# Patient Record
Sex: MALE | Race: BLACK OR AFRICAN AMERICAN | NOT HISPANIC OR LATINO | Employment: FULL TIME | URBAN - METROPOLITAN AREA
[De-identification: names, ages, dates, MRNs, and addresses within clinical notes are randomized per-mention and may not be internally consistent; named-entity substitution may affect disease eponyms.]

---

## 2017-05-25 ENCOUNTER — ALLSCRIPTS OFFICE VISIT (OUTPATIENT)
Dept: OTHER | Facility: OTHER | Age: 57
End: 2017-05-25

## 2017-05-25 ENCOUNTER — APPOINTMENT (OUTPATIENT)
Dept: LAB | Facility: CLINIC | Age: 57
End: 2017-05-25
Payer: COMMERCIAL

## 2017-05-25 DIAGNOSIS — Z13.6 ENCOUNTER FOR SCREENING FOR CARDIOVASCULAR DISORDERS: ICD-10-CM

## 2017-05-25 DIAGNOSIS — G47.30 SLEEP APNEA: ICD-10-CM

## 2017-05-25 DIAGNOSIS — E66.9 OBESITY: ICD-10-CM

## 2017-05-25 DIAGNOSIS — I10 ESSENTIAL (PRIMARY) HYPERTENSION: ICD-10-CM

## 2017-05-25 DIAGNOSIS — R73.01 IMPAIRED FASTING GLUCOSE: ICD-10-CM

## 2017-05-25 DIAGNOSIS — Z12.5 ENCOUNTER FOR SCREENING FOR MALIGNANT NEOPLASM OF PROSTATE: ICD-10-CM

## 2017-05-25 LAB
ALBUMIN SERPL BCP-MCNC: 4 G/DL (ref 3.5–5)
ALP SERPL-CCNC: 74 U/L (ref 46–116)
ALT SERPL W P-5'-P-CCNC: 33 U/L (ref 12–78)
ANION GAP SERPL CALCULATED.3IONS-SCNC: 6 MMOL/L (ref 4–13)
AST SERPL W P-5'-P-CCNC: 20 U/L (ref 5–45)
BILIRUB SERPL-MCNC: 0.4 MG/DL (ref 0.2–1)
BUN SERPL-MCNC: 20 MG/DL (ref 5–25)
CALCIUM SERPL-MCNC: 9.6 MG/DL (ref 8.3–10.1)
CHLORIDE SERPL-SCNC: 107 MMOL/L (ref 100–108)
CHOLEST SERPL-MCNC: 150 MG/DL (ref 50–200)
CO2 SERPL-SCNC: 28 MMOL/L (ref 21–32)
CREAT SERPL-MCNC: 1.45 MG/DL (ref 0.6–1.3)
CREAT UR-MCNC: 140 MG/DL
ERYTHROCYTE [DISTWIDTH] IN BLOOD BY AUTOMATED COUNT: 14.3 % (ref 11.6–15.1)
EST. AVERAGE GLUCOSE BLD GHB EST-MCNC: 137 MG/DL
GFR SERPL CREATININE-BSD FRML MDRD: >60 ML/MIN/1.73SQ M
GLUCOSE P FAST SERPL-MCNC: 107 MG/DL (ref 65–99)
HBA1C MFR BLD: 6.4 % (ref 4.2–6.3)
HCT VFR BLD AUTO: 43.6 % (ref 36.5–49.3)
HDLC SERPL-MCNC: 38 MG/DL (ref 40–60)
HGB BLD-MCNC: 14 G/DL (ref 12–17)
LDLC SERPL CALC-MCNC: 93 MG/DL (ref 0–100)
MCH RBC QN AUTO: 30.1 PG (ref 26.8–34.3)
MCHC RBC AUTO-ENTMCNC: 32.1 G/DL (ref 31.4–37.4)
MCV RBC AUTO: 94 FL (ref 82–98)
MICROALBUMIN UR-MCNC: 23.1 MG/L (ref 0–20)
MICROALBUMIN/CREAT 24H UR: 17 MG/G CREATININE (ref 0–30)
PLATELET # BLD AUTO: 152 THOUSANDS/UL (ref 149–390)
PMV BLD AUTO: 12.9 FL (ref 8.9–12.7)
POTASSIUM SERPL-SCNC: 4.4 MMOL/L (ref 3.5–5.3)
PROT SERPL-MCNC: 7.8 G/DL (ref 6.4–8.2)
PSA SERPL-MCNC: 0.3 NG/ML (ref 0–4)
RBC # BLD AUTO: 4.65 MILLION/UL (ref 3.88–5.62)
SODIUM SERPL-SCNC: 141 MMOL/L (ref 136–145)
TRIGL SERPL-MCNC: 97 MG/DL
TSH SERPL DL<=0.05 MIU/L-ACNC: 1.43 UIU/ML (ref 0.36–3.74)
WBC # BLD AUTO: 6.8 THOUSAND/UL (ref 4.31–10.16)

## 2017-05-25 PROCEDURE — 36415 COLL VENOUS BLD VENIPUNCTURE: CPT

## 2017-05-25 PROCEDURE — 82043 UR ALBUMIN QUANTITATIVE: CPT

## 2017-05-25 PROCEDURE — 80061 LIPID PANEL: CPT

## 2017-05-25 PROCEDURE — G0103 PSA SCREENING: HCPCS

## 2017-05-25 PROCEDURE — 84443 ASSAY THYROID STIM HORMONE: CPT

## 2017-05-25 PROCEDURE — 80053 COMPREHEN METABOLIC PANEL: CPT

## 2017-05-25 PROCEDURE — 85027 COMPLETE CBC AUTOMATED: CPT

## 2017-05-25 PROCEDURE — 83036 HEMOGLOBIN GLYCOSYLATED A1C: CPT

## 2017-05-25 PROCEDURE — 82570 ASSAY OF URINE CREATININE: CPT

## 2017-05-30 ENCOUNTER — GENERIC CONVERSION - ENCOUNTER (OUTPATIENT)
Dept: OTHER | Facility: OTHER | Age: 57
End: 2017-05-30

## 2017-06-02 ENCOUNTER — TRANSCRIBE ORDERS (OUTPATIENT)
Dept: ADMINISTRATIVE | Facility: HOSPITAL | Age: 57
End: 2017-06-02

## 2017-06-02 DIAGNOSIS — G47.30 INSOMNIA WITH SLEEP APNEA, UNSPECIFIED: Primary | ICD-10-CM

## 2017-06-02 DIAGNOSIS — G47.00 INSOMNIA WITH SLEEP APNEA, UNSPECIFIED: Primary | ICD-10-CM

## 2017-06-08 ENCOUNTER — TRANSCRIBE ORDERS (OUTPATIENT)
Dept: SLEEP CENTER | Facility: CLINIC | Age: 57
End: 2017-06-08

## 2017-06-08 DIAGNOSIS — G47.33 OSA (OBSTRUCTIVE SLEEP APNEA): Primary | ICD-10-CM

## 2017-06-15 ENCOUNTER — ALLSCRIPTS OFFICE VISIT (OUTPATIENT)
Dept: OTHER | Facility: OTHER | Age: 57
End: 2017-06-15

## 2017-08-11 ENCOUNTER — ALLSCRIPTS OFFICE VISIT (OUTPATIENT)
Dept: OTHER | Facility: OTHER | Age: 57
End: 2017-08-11

## 2017-08-22 ENCOUNTER — ALLSCRIPTS OFFICE VISIT (OUTPATIENT)
Dept: OTHER | Facility: OTHER | Age: 57
End: 2017-08-22

## 2017-09-11 DIAGNOSIS — R73.01 IMPAIRED FASTING GLUCOSE: ICD-10-CM

## 2018-01-09 NOTE — RESULT NOTES
Verified Results  (1) CBC/ PLT (NO DIFF) [id number] 10:25AM NEON Concierge Order Number: [id number]     Test Name Result Flag Reference   HEMATOCRIT 43 6 %  36 5-49 3   HEMOGLOBIN 14 0 g/dL  12 0-17 0   MCHC 32 1 g/dL  31 4-37 4   MCH 30 1 pg  26 8-34 3   MCV 94 fL  82-98   PLATELET COUNT 271 Thousands/uL  149-390   RBC COUNT 4 65 Million/uL  3 88-5 62   RDW 14 3 %  11 6-15 1   WBC COUNT 6 80 Thousand/uL  4 31-10 16   MPV 12 9 fL H 8 9-12 7     (1) COMPREHENSIVE METABOLIC PANEL [id number] 30:75TU NEON Concierge Order Number: [id number]     Test Name Result Flag Reference   SODIUM 141 mmol/L  136-145   POTASSIUM 4 4 mmol/L  3 5-5 3   CHLORIDE 107 mmol/L  100-108   CARBON DIOXIDE 28 mmol/L  21-32   ANION GAP (CALC) 6 mmol/L  4-13   BLOOD UREA NITROGEN 20 mg/dL  5-25   CREATININE 1 45 mg/dL H 0 60-1 30   Standardized to IDMS reference method   CALCIUM 9 6 mg/dL  8 3-10 1   BILI, TOTAL 0 40 mg/dL  0 20-1 00   ALK PHOSPHATAS 74 U/L     ALT (SGPT) 33 U/L  12-78   AST(SGOT) 20 U/L  5-45   ALBUMIN 4 0 g/dL  3 5-5 0   TOTAL PROTEIN 7 8 g/dL  6 4-8 2   eGFR Non-African American      >60 0 ml/min/1 73sq [facility] Disease Education Program recommendations are as follows:  GFR calculation is accurate only with a steady state creatinine  Chronic Kidney disease less than 60 ml/min/1 73 sq  meters  Kidney failure less than 15 ml/min/1 73 sq  meters  GLUCOSE FASTING 107 mg/dL H 65-99     (1) HEMOGLOBIN A1C [id number] 10:25AM NEON Concierge Order Number: [id number]     Test Name Result Flag Reference   HEMOGLOBIN A1C 6 4 % H 4 2-6 3   EST  AVG  GLUCOSE 137 mg/dl       (1) LIPID PANEL, FASTING [id number] 10:25AM NEON Concierge Order Number: [id number]     Test Name Result Flag Reference   CHOLESTEROL 150 mg/dL     HDL,DIRECT 38 mg/dL L 40-60   Specimen collection should occur prior to Metamizole administration due to the potential for falsely depressed results  LDL CHOLESTEROL CALCULATED 93 mg/dL  0-100   Triglyceride:         Normal              <150 mg/dl       Borderline High    150-199 mg/dl       High               200-499 mg/dl       Very High          >499 mg/dl  Cholesterol:         Desirable        <200 mg/dl      Borderline High  200-239 mg/dl      High             >239 mg/dl  HDL Cholesterol:        High    >59 mg/dL      Low     <41 mg/dL  LDL CALCULATED:    This screening LDL is a calculated result  It does not have the accuracy of the Direct Measured LDL in the monitoring of patients with hyperlipidemia and/or statin therapy  Direct Measure LDL (WXN710) must be ordered separately in these patients  TRIGLYCERIDES 97 mg/dL  <=150   Specimen collection should occur prior to N-Acetylcysteine or Metamizole administration due to the potential for falsely depressed results  (1) MICROALBUMIN CREATININE RATIO, RANDOM URINE [id number] 10:25AM Nani Dang Order Number: [id number]     Test Name Result Flag Reference   MICROALBUMIN/ CREAT R 17 mg/g creatinine  0-30   MICROALBUMIN,URINE 23 1 mg/L H 0 0-20 0   CREATININE URINE 140 0 mg/dL       (1) TSH [id number] 10:25AM Lily Quezada    Order Number: [id number]     Test Name Result Flag Reference   TSH 1 430 uIU/mL  0 358-3 740   Patients undergoing fluorescein dye angiography may retain small amounts of fluorescein in the body for 48-72 hours post procedure  Samples containing fluorescein can produce falsely depressed TSH values  If the patient had this procedure,a specimen should be resubmitted post fluorescein clearance       (1) PSA (SCREEN) (Dx V76 44 Screen for Prostate Cancer) [id number] 10:25AM Nani Dang Order Number: [id number]     Test Name Result Flag Reference   PROSTATE SPECIFIC ANTIGEN 0 3 ng/mL  0 0-4 0   American Urological Association Guidelines define biochemical recurrence of prostate cancer as a detectable or rising PSA value post-radical prostatectomy that is greater than or equal to 0 2 ng/mL with a second confirmatory level of greater than or equal to 0 2 ng/mL

## 2018-01-11 NOTE — PROGRESS NOTES
Assessment    1  Annual physical exam (V70 0) (Z00 00)   2  Encounter for screening for diabetes mellitus (V77 1) (Z13 1)   3  Encounter for screening for cardiovascular disorders (V81 2) (Z13 6)   4  Sleep apnea (780 57) (G47 30)   5  Screening PSA (prostate specific antigen) (V76 44) (Z12 5)   6  Screening for colon cancer (V76 51) (Z12 11)    Plan  Annual physical exam    · Begin a limited exercise program ; Status:Complete;   Done: 37RFE7796   · Diets that are low in carbohydrates and high in protein are very popular for weight loss ;  Status:Complete;   Done: 33PWL4769   · Eat no more than 30 grams of fat per day ; Status:Complete;   Done: 85GOQ0754   · We recommend routine visits to a dentist ; Status:Complete;   Done: 38AAA2583   · We recommend that you bring your body mass index down to 26 ; Status:Complete;    Done: 90SNH7305   · We recommend that you change your eating habits slowly ; Status:Complete;   Done:  74LMV3642   · You need to quit smoking ; Status:Complete;   Done: 52VBS2635   · You need to stop smoking  Though it is not easy, more than half of all adult smokers  have quit  We encourage you to write down all the reasons you should quit smoking and  set a quit date for yourself  Ask us how we can help  You may also call  [facility] for free resources and assistance ; Status:Complete;   Done:  76FRI1193  Encounter for screening for cardiovascular disorders    · (1) COMPREHENSIVE METABOLIC PANEL; Status:Active; Requested for:41Kcn2200;    · (1) LIPID PANEL, FASTING; Status:Active; Requested for:55Mox4207;    · (1) MICROALBUMIN CREATININE RATIO, RANDOM URINE; Status:Active; Requested  for:12Ytd3666;   Encounter for screening for cardiovascular disorders, Hypertension, Obesity, Sleep  apnea    · (1) CBC/ PLT (NO DIFF); Status:Active; Requested for:82Tes5966;   Encounter for screening for cardiovascular disorders, Hypertension, Sleep apnea    · (1) TSH; Status:Active;  Requested for:90Sjh2194; Encounter for screening for cardiovascular disorders, Impaired fasting glucose    · (1) HEMOGLOBIN A1C; Status:Active; Requested for:25May2017;   Hypertension    · AmLODIPine Besylate 10 MG Oral Tablet (Norvasc); take 1 tablet by mouth  every day  Screening for colon cancer    · COLONOSCOPY; Status:Active; Requested for:25May2017;    · *1 -  [facility]  *  Status: Active   Requested for: 40DAY7020  Care Summary provided  : Yes  Screening PSA (prostate specific antigen)    · (1) PSA (SCREEN) (Dx V76 44 Screen for Prostate Cancer); Status:Active; Requested  for:25May2017;   Sleep apnea    · *Polysomnography, Sleep Study, Diagnostic; Status:Need Information - Financial  Authorization; Requested for:25May2017;   condition(s) and/or medications: : sleep apnea , obesity  there any other medical conditions or medications that would explain these      symptoms? : Yes  is the sleep disturbance affecting the patient's ability to function? : recheck sleep      study  History/Symptoms: : positive sleep apnea  Study Only or Consult : Sleep Study and Consult and F/U with Sleep Specialist  SocHx: Tobacco use    · Chantix Starting Month Raghav 0 5 MG X 11 & 1 MG X 42 Oral Tablet; TAKE ONE 0 5MG  TABLET DAILY ON DAYS 1-3, THEN ONE 0 5MG TABLET TWICE DAILY ON DAYS 4-7,  THEN ONE 1MG TABLET TWICE DAILY THEREAFTER   · Follow-up visit in 3 weeks Evaluation and Treatment  Follow-up  Status: Complete  Done:  38IFR5431    Discussion/Summary  Impression: health maintenance visit  Currently, he eats a poor diet and has an inadequate exercise regimen  Prostate cancer screening: PSA was ordered  Screening lab work includes glucose and lipid profile  The immunizations are up to date   He was advised to be evaluated by an ophthalmologist      He has hypertension and not currently taking any medicine, I will restart him on amlodipine, and he will have low salt diet advised to have labs and follow-up, and his vision is up-to-date  He has previous history of renal insufficiency and seen by nephrologist and also for elevated A1c is seen for endocrinologist but not on any medication for diabetes  He will have his repeat colonoscopy last one was 5 years ago with polypectomy  Gave him papers for him to sleep study,  Follow-up in 2-3 weeks, he started on started    Chantix given as he is willing to quit smoking  The patient was counseled regarding instructions for management, impressions, importance of compliance with treatment  Chief Complaint  PREVENTATIVE      History of Present Illness  HPI: Physical ,he came after a few years  He has history of hypertension and renal insufficiency he has not been on any medication and last few years he stopped coming to the doctor and he is not taking any medicine, he was also seen by endocrinologist in the past for elevated liver and see, he says he needs to have related to his sleep study, he is on CPAP machine  He is a smoker and wants to quit for chantix  Review of Systems    Constitutional: No fever or chills, feels well, no tiredness, no recent weight gain or weight loss  Eyes: No complaints of eye pain, no red eyes, no discharge from eyes, no itchy eyes  ENT: no complaints of earache, no hearing loss, no nosebleeds, no nasal discharge, no sore throat, no hoarseness  Cardiovascular: No complaints of slow heart rate, no fast heart rate, no chest pain, no palpitations, no leg claudication, no lower extremity  Respiratory: No complaints of shortness of breath, no wheezing, no cough, no SOB on exertion, no orthopnea or PND  Gastrointestinal: No complaints of abdominal pain, no constipation, no nausea or vomiting, no diarrhea or bloody stools  Genitourinary: No complaints of dysuria, no incontinence, no hesitancy, no nocturia, no genital lesion, no testicular pain     Musculoskeletal: No complaints of arthralgia, no myalgias, no joint swelling or stiffness, no limb pain or swelling  Integumentary: No complaints of skin rash or skin lesions, no itching, no skin wound, no dry skin  Neurological: No compliants of headache, no confusion, no convulsions, no numbness or tingling, no dizziness or fainting, no limb weakness, no difficulty walking  Psychiatric: Is not suicidal, no sleep disturbances, no anxiety or depression, no change in personality, no emotional problems  Endocrine: No complaints of proptosis, no hot flashes, no muscle weakness, no erectile dysfunction, no deepening of the voice, no feelings of weakness  Hematologic/Lymphatic: No complaints of swollen glands, no swollen glands in the neck, does not bleed easily, no easy bruising  ROS reviewed  Active Problems    1  Benign prostatic hypertrophy with lower urinary tract symptoms (LUTS) (600 01) (N40 1)   2  Hypertension (401 9) (I10)   3  Impaired fasting glucose (790 21) (R73 01)   4  Morbid obesity with BMI of 40 0-44 9, adult (278 01,V85 41) (E66 01,Z68 41)   5  Obesity (278 00) (E66 9)   6  Renal insufficiency (593 9) (N28 9)   7  Sleep apnea (780 57) (G47 30)   8  Tobacco use (305 1) (Z72 0)   9  Urge and stress incontinence (788 33) (N39 46)   10   Urinary frequency (788 41) (R35 0)    Past Medical History    · History of Acute otitis media, unspecified laterality   · History of Acute upper respiratory infection (465 9) (J06 9)   · History of Difficulty breathing (786 09) (R06 89)   · History of Encounter for removal of sutures (V58 32) (Z48 02)   · History of diarrhea (V12 79) (H28 938)   · History of nausea and vomiting (V12 79) (Z87 898)   · History of Impaired fasting glucose (790 21) (R73 01)   · History of Laceration Of Thumb (883 0)   · History of Well adult on routine health check (V70 0) (Z00 00)    Surgical History    · History of Colonoscopy (Fiberoptic) Screening    Family History  Mother    · Family history of Cerebral Palsy   · Family history of Mother  At Age 61   · Family history of Reported Family History Of Early Sudden Deaths  Father    · Family history of Hypertension   · Family history of Prostate Cancer (V16 42)    Social History    · Denied: History of Alcohol Use (History)   · Current Every Day Smoker (305 1)   · Daily caffeine consumption   · Denied: History of Drug Use   ·    · Occupation:   · cook   · Tobacco use (305 1) (Z72 0)   · 1pack in 3 days    Current Meds   1  AmLODIPine Besylate 10 MG Oral Tablet; take 1 tablet by mouth every day; Therapy: 78WON5469 to (Tiffanie Else)  Requested for: 16MXB7769; Last   Rx:70Ppy5087 Ordered    Allergies    1  Cashew Nut OIL    Vitals   Recorded: [id number] 09:34AM   Heart Rate 98   Respiration 16   Systolic 179   Diastolic 506   Height 5 ft 9 in   Weight 298 lb    BMI Calculated 44 01   BSA Calculated 2 45     Physical Exam    Constitutional   General appearance: Abnormal   obese  Head and Face   Palpation of the face and sinuses: No sinus tenderness  Eyes   Conjunctiva and lids: No erythema, swelling or discharge  Pupils and irises: Equal, round, reactive to light  Ears, Nose, Mouth, and Throat   External inspection of ears and nose: Normal     Otoscopic examination: Tympanic membranes translucent with normal light reflex  Canals patent without erythema  Hearing: Normal     Nasal mucosa, septum, and turbinates: Normal without edema or erythema  Lips, teeth, and gums: Normal, good dentition  Oropharynx: Normal with no erythema, edema, exudate or lesions  Neck   Neck: Supple, symmetric, trachea midline, no masses  Thyroid: Normal, no thyromegaly  Pulmonary   Respiratory effort: No increased work of breathing or signs of respiratory distress  Percussion of chest: Normal     Palpation of chest: Normal     Auscultation of lungs: Clear to auscultation  Cardiovascular   Palpation of heart: Normal PMI, no thrills      Auscultation of heart: Normal rate and rhythm, normal S1 and S2, no murmurs  Examination of extremities for edema and/or varicosities: Abnormal   bilateral ankle 1+ pitting edema  Chest   Breasts: Normal, no dimpling or skin changes appreciated  Palpation of breasts and axillae: Normal, no masses palpated  Chest: Normal     Abdomen   Abdomen: Non-tender, no masses  Liver and spleen: No hepatomegaly or splenomegaly  Examination for hernias: No hernias appreciated  Anus, perineum, and rectum: Normal sphincter tone, no masses, no prolapse  Stool sample for occult blood: Negative  Genitourinary   Scrotal contents: Normal testes, no masses  Penis: Normal, no lesions  Digital rectal exam of prostate: Normal size, no masses  Lymphatic   Palpation of lymph nodes in neck: No lymphadenopathy  Palpation of lymph nodes in axillae: No lymphadenopathy  Palpation of lymph nodes in other areas: No lymphadenopathy  Musculoskeletal   Gait and station: Normal     Inspection/palpation of digits and nails: Normal without clubbing or cyanosis  Inspection/palpation of joints, bones, and muscles: Normal     Range of motion: Normal     Stability: Normal     Muscle strength/tone: Normal     Skin   Skin and subcutaneous tissue: Normal without rashes or lesions  Palpation of skin and subcutaneous tissue: Normal turgor  Neurologic   Cranial nerves: Cranial nerves 2-12 intact  Reflexes: 2+ and symmetric  Sensation: No sensory loss  Psychiatric   Judgment and insight: Normal     Orientation to person, place and time: Normal     Recent and remote memory: Intact  Mood and affect: Normal        Results/Data  PHQ-2 Adult Depression Screening 77WOC9489 09:39AM User, s     Test Name Result Flag Reference   PHQ-2 Adult Depression Score 0     Over the last two weeks, how often have you been bothered by any of the following problems?   Little interest or pleasure in doing things: Not at all - 0  Feeling down, depressed, or hopeless: Not at all - 0   PHQ-2 Adult Depression Screening Negative         Procedure    Procedure:   Results: 20/20 in the right eye with corrective device, 20/20 in the left eye with corrective device      Future Appointments    Date/Time Provider Specialty Site   06/15/2017 09:20 AM ROMEL Pascual   [address] [facility]     Signatures   Electronically signed by : ROMEL Will ; May 25 2017  1:45PM EST                       (Author)

## 2018-01-12 VITALS
RESPIRATION RATE: 16 BRPM | HEART RATE: 78 BPM | HEIGHT: 69 IN | WEIGHT: 289 LBS | DIASTOLIC BLOOD PRESSURE: 80 MMHG | BODY MASS INDEX: 42.8 KG/M2 | SYSTOLIC BLOOD PRESSURE: 142 MMHG

## 2018-01-13 VITALS
HEART RATE: 88 BPM | HEIGHT: 69 IN | DIASTOLIC BLOOD PRESSURE: 100 MMHG | SYSTOLIC BLOOD PRESSURE: 160 MMHG | BODY MASS INDEX: 39.99 KG/M2 | WEIGHT: 270 LBS | RESPIRATION RATE: 16 BRPM

## 2018-01-13 VITALS
DIASTOLIC BLOOD PRESSURE: 100 MMHG | SYSTOLIC BLOOD PRESSURE: 192 MMHG | RESPIRATION RATE: 16 BRPM | WEIGHT: 298 LBS | HEART RATE: 98 BPM | BODY MASS INDEX: 44.14 KG/M2 | HEIGHT: 69 IN

## 2018-01-14 VITALS
OXYGEN SATURATION: 98 % | SYSTOLIC BLOOD PRESSURE: 144 MMHG | WEIGHT: 290 LBS | TEMPERATURE: 96.5 F | DIASTOLIC BLOOD PRESSURE: 80 MMHG | HEART RATE: 70 BPM | BODY MASS INDEX: 42.83 KG/M2

## 2018-05-21 ENCOUNTER — TELEPHONE (OUTPATIENT)
Dept: FAMILY MEDICINE CLINIC | Facility: CLINIC | Age: 58
End: 2018-05-21

## 2018-05-21 NOTE — TELEPHONE ENCOUNTER
Patient called in to request a letter be sent to [employer] because he has a Cpap machine  He did not know his account number or fax number but said we should have it on file from last year       Tien Brizuela     [address] [place] [address]    [phone]

## 2018-08-17 ENCOUNTER — OFFICE VISIT (OUTPATIENT)
Dept: FAMILY MEDICINE CLINIC | Facility: CLINIC | Age: 58
End: 2018-08-17
Payer: COMMERCIAL

## 2018-08-17 VITALS
BODY MASS INDEX: 42.55 KG/M2 | OXYGEN SATURATION: 97 % | DIASTOLIC BLOOD PRESSURE: 100 MMHG | HEIGHT: 70 IN | WEIGHT: 297.2 LBS | HEART RATE: 88 BPM | RESPIRATION RATE: 16 BRPM | SYSTOLIC BLOOD PRESSURE: 162 MMHG

## 2018-08-17 DIAGNOSIS — Z13.0 SCREENING FOR DEFICIENCY ANEMIA: ICD-10-CM

## 2018-08-17 DIAGNOSIS — D12.6 ADENOMATOUS POLYP OF COLON, UNSPECIFIED PART OF COLON: ICD-10-CM

## 2018-08-17 DIAGNOSIS — IMO0001 CLASS 3 OBESITY WITH BODY MASS INDEX (BMI) OF 40.0 TO 44.9 IN ADULT, UNSPECIFIED OBESITY TYPE, UNSPECIFIED WHETHER SERIOUS COMORBIDITY PRESENT: ICD-10-CM

## 2018-08-17 DIAGNOSIS — I10 ESSENTIAL HYPERTENSION: Primary | ICD-10-CM

## 2018-08-17 DIAGNOSIS — R94.31 ABNORMAL EKG: ICD-10-CM

## 2018-08-17 DIAGNOSIS — R73.01 IMPAIRED FASTING BLOOD SUGAR: ICD-10-CM

## 2018-08-17 DIAGNOSIS — Z00.00 ANNUAL PHYSICAL EXAM: Primary | ICD-10-CM

## 2018-08-17 DIAGNOSIS — L83 ACANTHOSIS NIGRICANS: ICD-10-CM

## 2018-08-17 DIAGNOSIS — I10 ESSENTIAL HYPERTENSION: ICD-10-CM

## 2018-08-17 DIAGNOSIS — Z13.220 SCREENING CHOLESTEROL LEVEL: ICD-10-CM

## 2018-08-17 PROBLEM — R42 DIZZINESS: Status: ACTIVE | Noted: 2017-08-22

## 2018-08-17 PROBLEM — F41.9 ANXIETY: Status: ACTIVE | Noted: 2017-08-22

## 2018-08-17 PROBLEM — F40.241 PHOBIA TO HEIGHTS: Status: ACTIVE | Noted: 2017-08-22

## 2018-08-17 PROCEDURE — 99214 OFFICE O/P EST MOD 30 MIN: CPT | Performed by: FAMILY MEDICINE

## 2018-08-17 PROCEDURE — 93000 ELECTROCARDIOGRAM COMPLETE: CPT | Performed by: FAMILY MEDICINE

## 2018-08-17 PROCEDURE — 99396 PREV VISIT EST AGE 40-64: CPT | Performed by: FAMILY MEDICINE

## 2018-08-17 RX ORDER — AMLODIPINE BESYLATE 10 MG/1
1 TABLET ORAL DAILY
COMMUNITY
Start: 2014-03-24 | End: 2018-08-17 | Stop reason: SDUPTHER

## 2018-08-17 RX ORDER — AMLODIPINE BESYLATE 10 MG/1
10 TABLET ORAL DAILY
Qty: 30 TABLET | Refills: 0 | Status: SHIPPED | OUTPATIENT
Start: 2018-08-17 | End: 2018-09-13 | Stop reason: SDUPTHER

## 2018-08-17 NOTE — PROGRESS NOTES
Assessment/Plan:  No problem-specific Assessment & Plan notes found for this encounter  Diagnoses and all orders for this visit:    Annual physical exam  - last PE was 5/2017  - did not get repeat labs  - UTD with Td (2014)   - overdue for Optho and Dental visits  - last C-scope was maybe 4yrs ago - (+) adenomatous colon polyps, was supposed to f/u for repeat scope but did not follow-up - referral given for GI     Essential hypertension  - BP in the office elevated x2 - 160/110 and 162/100 on repeat   - is on Amlodipine 10mg QD but has not taken his antihypertensive for months bc does not like to take daily medications  - (+) FHx in Mother for heart disease  - also PMHx of sig of sleep apnea and obesity   -     POCT ECG: In office EKG: SR at 69bpm, nml intervals, Q-waves, LVH and occasional PACs  - did have an abnml EKG last year (8/22/2017) - noted to have non-specific T-wave changes, was referred to Cardio but did not follow-up  - strongly advised to take his antihypertensive and RTO in 2wks for f/u with his PCP = Dr Cal Baca  - will check labs  - Comprehensive metabolic panel; Future  -     Microalbumin / creatinine urine ratio    Abnormal EKG  - POCT ECG: In office EKG: SR at 69bpm, nml intervals, Q-waves, LVH and occasional PACs  - did have an abnml EKG last year (8/22/2017) - noted to have non-specific T-wave changes, was referred to Cardio but did not follow-up  -     Ambulatory referral to Cardiology; Future    Class 3 obesity with body mass index (BMI) of 40 0 to 44 9 in adult, unspecified obesity type, unspecified whether serious comorbidity present ([facility] [address] )  - discussed diet and exercise  - pt has gained ~30lbs since last year  -     TSH, 3rd generation with Free T4 reflex; Future  -     Vitamin D 25 hydroxy; Future    Screening for deficiency anemia  -     CBC and differential    Screening cholesterol level  -     Lipid panel;  Future    Adenomatous polyp of colon, unspecified part of colon  - Ambulatory referral to Gastroenterology; Future    Impaired fasting blood sugar  Acanthosis Nigricans   - HbA1c (5/25/2017): 6 4  -   HEMOGLOBIN A1C W/ EAG ESTIMATION; Future        Subjective:    Patient ID: Akbar Lawrence is a 66 y o  male    Akbar Lawrence is a 66 y o  male who presents to the office for an annual exam  1) Annual   - PMHx: HTN, Impaired fasting blood sugar, sleep apnea (on CPAP, last sleep study was 3yrs ago), renal insufficiency, BPH, anxiety, morbid obesity   - allergies: all nuts (except peanuts and almonds)   - Meds: amlodipine 10mg QD - did not take it this AM - last dose was a few months ago   - PSHx: has a healed abdominal scar from surgery he had during his childhood     - FHx: M(CP, heart disease, murmur), F (unknown), MGF (emphysema), MGM (pancreatic ca - dx at 63), S (breast ca - dx at 50), denies FHx of colon/prostate ca   - Immunizations: Td in 2014   - Hm: last colonoscopy: did have one maybe 4yrs ago, was supposed to go back bc had polyps but did not follow-up   - diet/exercise: exercise: no, diet: "sporadic"   - social: former smoker (1/4PPD/15yrs, quit with Chantix 6/2017), social EtOH, denies illicits; has gained 50PHX in the past year   - sexual Hx: active with female (his fiance)  - last vision: wears glasses, last Optho was last year   - last dental: 2yrs ago   2) HTN  - BP in the office 160/110 and 162/100 on repeat   - (+) N/V 3days ago - now resolved   - (+) HA a few days ago - none currently, slight change in vision  - denies CP/palpitations/SOB/wheezing/abd pain/D/C/urinary incontinence/numbness or tingling in b/l UE+LE/LE edema or calf tenderness  - supposed to be on Amlodipine 10mg QD - last dose was a few months ago bc does not like to take daily medication  - diet: not a lot of salt in diet   - (+) FHx of heart diease in Mother    - does have a BP cuff at home but does not check his pressures       The following portions of the patient's history were reviewed and updated as appropriate: allergies, current medications, past family history, past medical history, past social history, past surgical history and problem list     Review of Systems  as per HPI    Objective:  BP (!) 160/110   Pulse 88   Resp 16   Ht 5' 10" (1 778 m)   Wt 135 kg (297 lb 3 2 oz)   SpO2 97%   BMI 42 64 kg/m²    Physical Exam   Constitutional: He is oriented to person, place, and time  He appears well-developed and well-nourished  No distress  HENT:   Head: Normocephalic and atraumatic  Right Ear: External ear normal    Left Ear: External ear normal    Nose: Nose normal    Eyes: Conjunctivae and EOM are normal  Right eye exhibits no discharge  Left eye exhibits no discharge  No scleral icterus  Wears glasses   Neck: Normal range of motion  Cardiovascular: Normal rate, regular rhythm and normal heart sounds  Exam reveals no gallop and no friction rub  No murmur heard  Pulmonary/Chest: Effort normal and breath sounds normal  No stridor  No respiratory distress  He has no wheezes  He has no rales  He exhibits no tenderness  Abdominal: Soft  Bowel sounds are normal  He exhibits no distension  There is no tenderness  There is no rebound and no guarding  Obese abdomen, BMI 42 6   Musculoskeletal: Normal range of motion  He exhibits no edema, tenderness or deformity  +5/5 UE+LE b/l   Neurological: He is alert and oriented to person, place, and time  CN grossly intact    Skin: Skin is warm  No rash noted  He is not diaphoretic  No erythema  No pallor    (+) acanthosis nigricans    Psychiatric: He has a normal mood and affect  His behavior is normal  Judgment and thought content normal    Vitals reviewed

## 2018-08-20 ENCOUNTER — APPOINTMENT (OUTPATIENT)
Dept: LAB | Facility: CLINIC | Age: 58
End: 2018-08-20
Payer: COMMERCIAL

## 2018-08-20 ENCOUNTER — TRANSCRIBE ORDERS (OUTPATIENT)
Dept: LAB | Facility: CLINIC | Age: 58
End: 2018-08-20

## 2018-08-20 DIAGNOSIS — R73.01 IMPAIRED FASTING BLOOD SUGAR: ICD-10-CM

## 2018-08-20 DIAGNOSIS — Z13.220 SCREENING CHOLESTEROL LEVEL: ICD-10-CM

## 2018-08-20 DIAGNOSIS — IMO0001 CLASS 3 OBESITY WITH BODY MASS INDEX (BMI) OF 40.0 TO 44.9 IN ADULT, UNSPECIFIED OBESITY TYPE, UNSPECIFIED WHETHER SERIOUS COMORBIDITY PRESENT: ICD-10-CM

## 2018-08-20 DIAGNOSIS — I10 ESSENTIAL HYPERTENSION: ICD-10-CM

## 2018-08-20 LAB
25(OH)D3 SERPL-MCNC: 24.8 NG/ML (ref 30–100)
ALBUMIN SERPL BCP-MCNC: 3.7 G/DL (ref 3.5–5)
ALP SERPL-CCNC: 70 U/L (ref 46–116)
ALT SERPL W P-5'-P-CCNC: 24 U/L (ref 12–78)
ANION GAP SERPL CALCULATED.3IONS-SCNC: 3 MMOL/L (ref 4–13)
AST SERPL W P-5'-P-CCNC: 19 U/L (ref 5–45)
BASOPHILS # BLD AUTO: 0.03 THOUSANDS/ΜL (ref 0–0.1)
BASOPHILS NFR BLD AUTO: 0 % (ref 0–1)
BILIRUB SERPL-MCNC: 0.53 MG/DL (ref 0.2–1)
BUN SERPL-MCNC: 18 MG/DL (ref 5–25)
CALCIUM SERPL-MCNC: 9.2 MG/DL (ref 8.3–10.1)
CHLORIDE SERPL-SCNC: 103 MMOL/L (ref 100–108)
CHOLEST SERPL-MCNC: 138 MG/DL (ref 50–200)
CO2 SERPL-SCNC: 29 MMOL/L (ref 21–32)
CREAT SERPL-MCNC: 1.4 MG/DL (ref 0.6–1.3)
CREAT UR-MCNC: 86.6 MG/DL
EOSINOPHIL # BLD AUTO: 0.2 THOUSAND/ΜL (ref 0–0.61)
EOSINOPHIL NFR BLD AUTO: 3 % (ref 0–6)
ERYTHROCYTE [DISTWIDTH] IN BLOOD BY AUTOMATED COUNT: 13.7 % (ref 11.6–15.1)
EST. AVERAGE GLUCOSE BLD GHB EST-MCNC: 154 MG/DL
GFR SERPL CREATININE-BSD FRML MDRD: 64 ML/MIN/1.73SQ M
GLUCOSE P FAST SERPL-MCNC: 142 MG/DL (ref 65–99)
HBA1C MFR BLD: 7 % (ref 4.2–6.3)
HCT VFR BLD AUTO: 42.9 % (ref 36.5–49.3)
HDLC SERPL-MCNC: 36 MG/DL (ref 40–60)
HGB BLD-MCNC: 13.6 G/DL (ref 12–17)
IMM GRANULOCYTES # BLD AUTO: 0.02 THOUSAND/UL (ref 0–0.2)
IMM GRANULOCYTES NFR BLD AUTO: 0 % (ref 0–2)
LDLC SERPL CALC-MCNC: 86 MG/DL (ref 0–100)
LYMPHOCYTES # BLD AUTO: 1.86 THOUSANDS/ΜL (ref 0.6–4.47)
LYMPHOCYTES NFR BLD AUTO: 27 % (ref 14–44)
MCH RBC QN AUTO: 29.6 PG (ref 26.8–34.3)
MCHC RBC AUTO-ENTMCNC: 31.7 G/DL (ref 31.4–37.4)
MCV RBC AUTO: 94 FL (ref 82–98)
MICROALBUMIN UR-MCNC: 11.5 MG/L (ref 0–20)
MICROALBUMIN/CREAT 24H UR: 13 MG/G CREATININE (ref 0–30)
MONOCYTES # BLD AUTO: 0.72 THOUSAND/ΜL (ref 0.17–1.22)
MONOCYTES NFR BLD AUTO: 10 % (ref 4–12)
NEUTROPHILS # BLD AUTO: 4.19 THOUSANDS/ΜL (ref 1.85–7.62)
NEUTS SEG NFR BLD AUTO: 60 % (ref 43–75)
NONHDLC SERPL-MCNC: 102 MG/DL
NRBC BLD AUTO-RTO: 0 /100 WBCS
PLATELET # BLD AUTO: 177 THOUSANDS/UL (ref 149–390)
PMV BLD AUTO: 12.3 FL (ref 8.9–12.7)
POTASSIUM SERPL-SCNC: 4.3 MMOL/L (ref 3.5–5.3)
PROT SERPL-MCNC: 8.1 G/DL (ref 6.4–8.2)
RBC # BLD AUTO: 4.59 MILLION/UL (ref 3.88–5.62)
SODIUM SERPL-SCNC: 135 MMOL/L (ref 136–145)
TRIGL SERPL-MCNC: 78 MG/DL
TSH SERPL DL<=0.05 MIU/L-ACNC: 1.55 UIU/ML (ref 0.36–3.74)
WBC # BLD AUTO: 7.02 THOUSAND/UL (ref 4.31–10.16)

## 2018-08-20 PROCEDURE — 36415 COLL VENOUS BLD VENIPUNCTURE: CPT | Performed by: FAMILY MEDICINE

## 2018-08-20 PROCEDURE — 85025 COMPLETE CBC W/AUTO DIFF WBC: CPT | Performed by: FAMILY MEDICINE

## 2018-08-20 PROCEDURE — 82570 ASSAY OF URINE CREATININE: CPT | Performed by: FAMILY MEDICINE

## 2018-08-20 PROCEDURE — 80053 COMPREHEN METABOLIC PANEL: CPT

## 2018-08-20 PROCEDURE — 83036 HEMOGLOBIN GLYCOSYLATED A1C: CPT

## 2018-08-20 PROCEDURE — 3061F NEG MICROALBUMINURIA REV: CPT | Performed by: FAMILY MEDICINE

## 2018-08-20 PROCEDURE — 84443 ASSAY THYROID STIM HORMONE: CPT

## 2018-08-20 PROCEDURE — 82043 UR ALBUMIN QUANTITATIVE: CPT | Performed by: FAMILY MEDICINE

## 2018-08-20 PROCEDURE — 80061 LIPID PANEL: CPT

## 2018-08-20 PROCEDURE — 82306 VITAMIN D 25 HYDROXY: CPT

## 2018-08-22 ENCOUNTER — OFFICE VISIT (OUTPATIENT)
Dept: CARDIOLOGY CLINIC | Facility: CLINIC | Age: 58
End: 2018-08-22
Payer: COMMERCIAL

## 2018-08-22 VITALS
WEIGHT: 299.5 LBS | OXYGEN SATURATION: 96 % | DIASTOLIC BLOOD PRESSURE: 88 MMHG | HEART RATE: 76 BPM | BODY MASS INDEX: 42.88 KG/M2 | SYSTOLIC BLOOD PRESSURE: 146 MMHG | HEIGHT: 70 IN

## 2018-08-22 DIAGNOSIS — I10 ESSENTIAL HYPERTENSION: ICD-10-CM

## 2018-08-22 DIAGNOSIS — R42 DIZZINESS: ICD-10-CM

## 2018-08-22 DIAGNOSIS — R94.31 ABNORMAL EKG: Primary | ICD-10-CM

## 2018-08-22 DIAGNOSIS — I49.3 PVC (PREMATURE VENTRICULAR CONTRACTION): ICD-10-CM

## 2018-08-22 PROCEDURE — 99204 OFFICE O/P NEW MOD 45 MIN: CPT | Performed by: INTERNAL MEDICINE

## 2018-08-22 PROCEDURE — 93000 ELECTROCARDIOGRAM COMPLETE: CPT | Performed by: INTERNAL MEDICINE

## 2018-08-22 NOTE — LETTER
August 22, 2018     Ulisesbhavani De Anda DO  [address]  [facility] [address]    Patient: Jovanny Rosario   YOB: 1960   Date of Visit: 8/22/2018       Dear Dr Eriberto Yu:    Thank you for referring Jovanny Rosario to me for evaluation  Below are my notes for this consultation  If you have questions, please do not hesitate to call me  I look forward to following your patient along with you  Sincerely,        Ary Crews MD        CC: No Recipients  Ary Crews MD  8/22/2018  3:55 PM  Sign at close encounter                                             Cardiology Consultation     Jovanny Rosario  [id number]  1960  [place] [id number]    1  Abnormal EKG  Ambulatory referral to Cardiology    POCT ECG   2  Dizziness     3  Essential hypertension     4  PVC (premature ventricular contraction)       Chief Complaint   Patient presents with   [address]     new pt, pt has no cardiac complaints      HPI: patient is here for evaluation of abnormal EKG done at PCP's office that revealed a prior infarct, LVH, and PVC  He has no cardiac complaints at this time        Patient Active Problem List   Diagnosis    Adenomatous colon polyp    Abnormal EKG    Anxiety    Benign prostatic hyperplasia with lower urinary tract symptoms    Dizziness    Hypertension    Impaired fasting blood sugar    Morbid obesity with BMI of 40 0-44 9, adult ([facility])    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    Renal insufficiency    Sleep apnea    Urge and stress incontinence    Increased frequency of urination    Class 3 obesity with body mass index (BMI) of 40 0 to 44 9 in adult ([facility] [address] )    PVC (premature ventricular contraction)     Past Medical History:   Diagnosis Date    Anxiety     BPH (benign prostatic hyperplasia)     Difficulty breathing 01/05/2011    Hypertension     Impaired fasting blood sugar     Last Assessed: 1/2/2013    Obesity     Sleep apnea      Social History     Social History    Marital status: /Civil Union     Spouse name: N/A    Number of children: N/A    Years of education: N/A     Occupational History    Cook      Social History Main Topics    Smoking status: Former Smoker     Packs/day: 0 25     Years: 30 00     Quit date: 06/2017    Smokeless tobacco: Never Used      Comment: Current every day smoker  wants to quit per [employer],  1 pack in 3 days    Alcohol use 3 6 oz/week     6 Standard drinks or equivalent per week    Drug use: No    Sexual activity: Yes     Partners: Female      Comment: no new partners in the past 3months      Other Topics Concern    Not on file     Social History Narrative    Daily caffeine consumption    Dental care, regularly    Inadequate exercise    Poorly balanced diet          Family History   Problem Relation Age of Onset    Heart murmur Mother     Cerebral palsy Mother     Heart disease Mother     Sudden death Mother         Early    Breast cancer Sister 48    Pancreatic cancer Maternal Grandmother 61    Emphysema Maternal Grandfather     Hypertension Father     Prostate cancer Father     Colon cancer Neg Hx     Anuerysm Neg Hx     Arrhythmia Neg Hx     Heart failure Neg Hx     Clotting disorder Neg Hx      Past Surgical History:   Procedure Laterality Date    COLONOSCOPY      Fiberoptic; Last Assessed: 10/24/2012       Current Outpatient Prescriptions:     amLODIPine (NORVASC) 10 mg tablet, Take 1 tablet (10 mg total) by mouth daily, Disp: 30 tablet, Rfl: 0  Allergies   Allergen Reactions    Cashew Nut Oil Anaphylaxis     Annotation - [id number]:  Allergic to nuts, ok with Peanuts and Almonds     Vitals:    08/22/18 1539   BP: 146/88   BP Location: Left arm   Patient Position: Sitting   Cuff Size: Large   Pulse: 76   SpO2: 96%   Weight: 136 kg (299 lb 8 oz)   Height: 5' 10" (1 778 m)       Labs:  Appointment on 08/20/2018   Component Date Value    Sodium 08/20/2018 135*    Potassium 08/20/2018 4 3     Chloride 08/20/2018 103     CO2 08/20/2018 29     Anion Gap 08/20/2018 3*    BUN 08/20/2018 18     Creatinine 08/20/2018 1 40*    Glucose, Fasting 08/20/2018 142*    Calcium 08/20/2018 9 2     AST 08/20/2018 19     ALT 08/20/2018 24     Alkaline Phosphatase 08/20/2018 70     Total Protein 08/20/2018 8 1     Albumin 08/20/2018 3 7     Total Bilirubin 08/20/2018 0 53     eGFR 08/20/2018 64     TSH 3RD GENERATON 08/20/2018 1 550     Vit D, 25-Hydroxy 08/20/2018 24 8*    Cholesterol 08/20/2018 138     Triglycerides 08/20/2018 78     HDL, Direct 08/20/2018 36*    LDL Calculated 08/20/2018 86     Non-HDL-Chol (CHOL-HDL) 08/20/2018 102     Hemoglobin A1C 08/20/2018 7 0*    EAG 08/20/2018 154    Office Visit on 08/17/2018   Component Date Value    WBC 08/20/2018 7 02     RBC 08/20/2018 4 59     Hemoglobin 08/20/2018 13 6     Hematocrit 08/20/2018 42 9     MCV 08/20/2018 94     MCH 08/20/2018 29 6     MCHC 08/20/2018 31 7     RDW 08/20/2018 13 7     MPV 08/20/2018 12 3     Platelets 46/25/9784 177     nRBC 08/20/2018 0     Neutrophils Relative 08/20/2018 60     Immat GRANS % 08/20/2018 0     Lymphocytes Relative 08/20/2018 27     Monocytes Relative 08/20/2018 10     Eosinophils Relative 08/20/2018 3     Basophils Relative 08/20/2018 0     Neutrophils Absolute 08/20/2018 4 19     Immature Grans Absolute 08/20/2018 0 02     Lymphocytes Absolute 08/20/2018 1 86     Monocytes Absolute 08/20/2018 0 72     Eosinophils Absolute 08/20/2018 0 20     Basophils Absolute 08/20/2018 0 03     Creatinine, Ur 08/20/2018 86 6     Microalbum  ,U,Random 08/20/2018 11 5     Microalb Creat Ratio 08/20/2018 13      Lab Results   Component Value Date    TRIG 78 08/20/2018    HDL 36 (L) 08/20/2018     Imaging: No results found  Review of Systems:  Review of Systems   Constitutional: Negative for activity change, appetite change, fatigue and fever  HENT: Negative for nosebleeds and sore throat  Eyes: Negative for photophobia and visual disturbance  Respiratory: Negative for cough, chest tightness, shortness of breath and wheezing  Cardiovascular: Negative for chest pain, palpitations and leg swelling  Gastrointestinal: Negative for abdominal pain, diarrhea, nausea and vomiting  Endocrine: Negative for polyuria  Genitourinary: Negative for dysuria, frequency and hematuria  Musculoskeletal: Negative for arthralgias, back pain and gait problem  Skin: Negative for pallor and rash  Neurological: Negative for dizziness, syncope, speech difficulty and light-headedness  Hematological: Does not bruise/bleed easily  Psychiatric/Behavioral: Negative for agitation, behavioral problems and confusion  Physical Exam:  Physical Exam   Constitutional: He is oriented to person, place, and time  He appears well-developed and well-nourished  No distress  HENT:   Head: Normocephalic and atraumatic  Nose: Nose normal    Eyes: EOM are normal  Pupils are equal, round, and reactive to light  No scleral icterus  Neck: Normal range of motion  Neck supple  No JVD present  Cardiovascular: Normal rate, regular rhythm, S1 normal and S2 normal   Exam reveals no gallop, no S3, no distant heart sounds and no friction rub  No murmur heard  No systolic murmur is present   Pulmonary/Chest: Effort normal and breath sounds normal  No respiratory distress  He has no wheezes  He has no rales  Abdominal: Soft  Bowel sounds are normal  He exhibits no distension  Musculoskeletal: He exhibits no edema or deformity  Neurological: He is alert and oriented to person, place, and time  No cranial nerve deficit  Skin: Skin is warm and dry  He is not diaphoretic  No erythema  Psychiatric: He has a normal mood and affect  His behavior is normal    Vitals reviewed        EKG:  Normal sinus rhythm      Discussion/Summary:  Abnormal EKG: will screen for cardiac issues with a stress test to rule out ischemic disease, an echo to rule out structural heart disease, and a Holter to assess PVC burden  HTN: relatively well controlled on amlodipine

## 2018-08-22 NOTE — PATIENT INSTRUCTIONS
Premature Ventricular Contractions   WHAT YOU NEED TO KNOW:   What are premature ventricular contractions? Premature ventricular contractions (PVCs) are an interruption in your heart rhythm  They are caused by an early signal for your heart to pump  Your risk of PVCs increases when you drink alcohol or caffeine, or if you are fatigued or stressed  It is very important for you to follow up with your healthcare provider so the cause of your PVC can be diagnosed and treated  What are symptoms of PVCs? · A skipped heartbeat     · A fast heartbeat    · Chest pain     · Lightheadedness, dizziness, or faintness    · Tiredness with exercise or activity  How are PVCs diagnosed? Your healthcare provider will ask if you have a family history of heart problems  You may also need one or more of the following:  · An EKG  records your heart rhythm and how fast your heart beats  It is used to check for PVCs  · A Holter monitor  is a device that you wear for a period of time  It records how fast your heart beats, and if it beats in a regular pattern  You may need to wear it up to 72 hours  This will show how frequent your PVCs are during your normal daily activities  · An echocardiogram  (echo) is a type of ultrasound that shows the movement and blood vessels of your heart on a monitor  How are PVCs treated? Your treatment will depend on the cause of your PVC  You may need any of the following:  · Heart medicine  may be given to make your heart beat at a regular rate and rhythm  · Cardiac ablation  is a procedure that is used to treat an abnormal heart rhythm  Ask your healthcare provider for more information  When should I contact my healthcare provider? · You still have symptoms after treatment, or your symptoms worsen  · You have questions or concerns about your condition or care  When should I seek immediate care or call 911?    · You have any of the following signs of a heart attack:      ¨ Squeezing, pressure, or pain in your chest that lasts longer than 5 minutes or returns    ¨ Discomfort or pain in your back, neck, jaw, stomach, or arm     ¨ Trouble breathing    ¨ Nausea or vomiting    ¨ Lightheadedness or a sudden cold sweat, especially with chest pain or trouble breathing    CARE AGREEMENT:   You have the right to help plan your care  Learn about your health condition and how it may be treated  Discuss treatment options with your caregivers to decide what care you want to receive  You always have the right to refuse treatment  The above information is an  only  It is not intended as medical advice for individual conditions or treatments  Talk to your doctor, nurse or pharmacist before following any medical regimen to see if it is safe and effective for you  © 2017 [employer] Information is for End User's use only and may not be sold, redistributed or otherwise used for commercial purposes  All illustrations and images included in CareNotes® are the copyrighted property of [employer]  or Shayne [employer]

## 2018-08-22 NOTE — PROGRESS NOTES
Cardiology Consultation     Briseyda Campbell  [id number]  1960  [place] [facility] [address]  [facility] [address] [place] [id number]    1  Abnormal EKG  Ambulatory referral to Cardiology    POCT ECG   2  Dizziness     3  Essential hypertension     4  PVC (premature ventricular contraction)       Chief Complaint   Patient presents with   [address]     new pt, pt has no cardiac complaints      HPI: patient is here for evaluation of abnormal EKG done at PCP's office that revealed a prior infarct, LVH, and PVC  He has no cardiac complaints at this time        Patient Active Problem List   Diagnosis    Adenomatous colon polyp    Abnormal EKG    Anxiety    Benign prostatic hyperplasia with lower urinary tract symptoms    Dizziness    Hypertension    Impaired fasting blood sugar    Morbid obesity with BMI of 40 0-44 9, adult ([facility])    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    Renal insufficiency    Sleep apnea    Urge and stress incontinence    Increased frequency of urination    Class 3 obesity with body mass index (BMI) of 40 0 to 44 9 in adult ([facility])    PVC (premature ventricular contraction)     Past Medical History:   Diagnosis Date    Anxiety     BPH (benign prostatic hyperplasia)     Difficulty breathing 01/05/2011    Hypertension     Impaired fasting blood sugar     Last Assessed: 1/2/2013    Obesity     Sleep apnea      Social History     Social History    Marital status: /Civil Union     Spouse name: N/A    Number of children: N/A    Years of education: N/A     Occupational History    Cook      Social History Main Topics    Smoking status: Former Smoker     Packs/day: 0 25     Years: 30 00     Quit date: 06/2017    Smokeless tobacco: Never Used      Comment: Current every day smoker  wants to quit per [employer],  1 pack in 3 days    Alcohol use 3 6 oz/week     6 Standard drinks or equivalent per week    Drug use: No    Sexual activity: Yes     Partners: Female      Comment: no new partners in the past 3months      Other Topics Concern    Not on file     Social History Narrative    Daily caffeine consumption    Dental care, regularly    Inadequate exercise    Poorly balanced diet          Family History   Problem Relation Age of Onset    Heart murmur Mother     Cerebral palsy Mother     Heart disease Mother     Sudden death Mother         Early   [place] Breast cancer Sister 48    Pancreatic cancer Maternal Grandmother 61    Emphysema Maternal Grandfather     Hypertension Father     Prostate cancer Father     Colon cancer Neg Hx     Anuerysm Neg Hx     Arrhythmia Neg Hx     Heart failure Neg Hx     Clotting disorder Neg Hx      Past Surgical History:   Procedure Laterality Date    COLONOSCOPY      Fiberoptic; Last Assessed: 10/24/2012       Current Outpatient Prescriptions:     amLODIPine (NORVASC) 10 mg tablet, Take 1 tablet (10 mg total) by mouth daily, Disp: 30 tablet, Rfl: 0  Allergies   Allergen Reactions    Cashew Nut Oil Anaphylaxis     Annotation - [id number]:  Allergic to nuts, ok with Peanuts and Almonds     Vitals:    08/22/18 1539   BP: 146/88   BP Location: Left arm   Patient Position: Sitting   Cuff Size: Large   Pulse: 76   SpO2: 96%   Weight: 136 kg (299 lb 8 oz)   Height: 5' 10" (1 778 m)       Labs:  Appointment on 08/20/2018   Component Date Value    Sodium 08/20/2018 135*    Potassium 08/20/2018 4 3     Chloride 08/20/2018 103     CO2 08/20/2018 29     Anion Gap 08/20/2018 3*    BUN 08/20/2018 18     Creatinine 08/20/2018 1 40*    Glucose, Fasting 08/20/2018 142*    Calcium 08/20/2018 9 2     AST 08/20/2018 19     ALT 08/20/2018 24     Alkaline Phosphatase 08/20/2018 70     Total Protein 08/20/2018 8 1     Albumin 08/20/2018 3 7     Total Bilirubin 08/20/2018 0 53     eGFR 08/20/2018 64     TSH 3RD GENERATON 08/20/2018 1 550     Vit D, 25-Hydroxy 08/20/2018 24 8*    Cholesterol 08/20/2018 138     Triglycerides 08/20/2018 78     HDL, Direct 08/20/2018 36*    LDL Calculated 08/20/2018 86     Non-HDL-Chol (CHOL-HDL) 08/20/2018 102     Hemoglobin A1C 08/20/2018 7 0*    EAG 08/20/2018 154    Office Visit on 08/17/2018   Component Date Value    WBC 08/20/2018 7 02     RBC 08/20/2018 4 59     Hemoglobin 08/20/2018 13 6     Hematocrit 08/20/2018 42 9     MCV 08/20/2018 94     MCH 08/20/2018 29 6     MCHC 08/20/2018 31 7     RDW 08/20/2018 13 7     MPV 08/20/2018 12 3     Platelets 12/31/1422 177     nRBC 08/20/2018 0     Neutrophils Relative 08/20/2018 60     Immat GRANS % 08/20/2018 0     Lymphocytes Relative 08/20/2018 27     Monocytes Relative 08/20/2018 10     Eosinophils Relative 08/20/2018 3     Basophils Relative 08/20/2018 0     Neutrophils Absolute 08/20/2018 4 19     Immature Grans Absolute 08/20/2018 0 02     Lymphocytes Absolute 08/20/2018 1 86     Monocytes Absolute 08/20/2018 0 72     Eosinophils Absolute 08/20/2018 0 20     Basophils Absolute 08/20/2018 0 03     Creatinine, Ur 08/20/2018 86 6     Microalbum  ,U,Random 08/20/2018 11 5     Microalb Creat Ratio 08/20/2018 13      Lab Results   Component Value Date    TRIG 78 08/20/2018    HDL 36 (L) 08/20/2018     Imaging: No results found  Review of Systems:  Review of Systems   Constitutional: Negative for activity change, appetite change, fatigue and fever  HENT: Negative for nosebleeds and sore throat  Eyes: Negative for photophobia and visual disturbance  Respiratory: Negative for cough, chest tightness, shortness of breath and wheezing  Cardiovascular: Negative for chest pain, palpitations and leg swelling  Gastrointestinal: Negative for abdominal pain, diarrhea, nausea and vomiting  Endocrine: Negative for polyuria  Genitourinary: Negative for dysuria, frequency and hematuria  Musculoskeletal: Negative for arthralgias, back pain and gait problem     Skin: Negative for pallor and rash  Neurological: Negative for dizziness, syncope, speech difficulty and light-headedness  Hematological: Does not bruise/bleed easily  Psychiatric/Behavioral: Negative for agitation, behavioral problems and confusion  Physical Exam:  Physical Exam   Constitutional: He is oriented to person, place, and time  He appears well-developed and well-nourished  No distress  HENT:   Head: Normocephalic and atraumatic  Nose: Nose normal    Eyes: EOM are normal  Pupils are equal, round, and reactive to light  No scleral icterus  Neck: Normal range of motion  Neck supple  No JVD present  Cardiovascular: Normal rate, regular rhythm, S1 normal and S2 normal   Exam reveals no gallop, no S3, no distant heart sounds and no friction rub  No murmur heard  No systolic murmur is present   Pulmonary/Chest: Effort normal and breath sounds normal  No respiratory distress  He has no wheezes  He has no rales  Abdominal: Soft  Bowel sounds are normal  He exhibits no distension  Musculoskeletal: He exhibits no edema or deformity  Neurological: He is alert and oriented to person, place, and time  No cranial nerve deficit  Skin: Skin is warm and dry  He is not diaphoretic  No erythema  Psychiatric: He has a normal mood and affect  His behavior is normal    Vitals reviewed  EKG:  Normal sinus rhythm with 1st degree AV block with premature atrial contractions in a pattern of bigeminy  Cannot rule out anterior infarct, age undetermined  Abnormal ECG    Discussion/Summary:  Abnormal EKG: will screen for cardiac issues with a stress test to rule out ischemic disease, an echo to rule out structural heart disease, and a Holter to assess PVC burden  HTN: relatively well controlled on amlodipine

## 2018-08-23 ENCOUNTER — TELEPHONE (OUTPATIENT)
Dept: CARDIOLOGY CLINIC | Facility: CLINIC | Age: 58
End: 2018-08-23

## 2018-08-23 DIAGNOSIS — R93.1 ABNORMAL ECHOCARDIOGRAM: Primary | ICD-10-CM

## 2018-08-23 DIAGNOSIS — I49.3 VENTRICULAR PREMATURE BEATS: ICD-10-CM

## 2018-08-23 NOTE — TELEPHONE ENCOUNTER
This patients myoview needs a peer to peer review  Her echo was approved  Phone# [phone] option2    ID# [id number]    Please let me know if you get an auth

## 2018-08-24 ENCOUNTER — HOSPITAL ENCOUNTER (OUTPATIENT)
Dept: NON INVASIVE DIAGNOSTICS | Facility: HOSPITAL | Age: 58
Discharge: HOME | End: 2018-08-24
Attending: INTERNAL MEDICINE
Payer: COMMERCIAL

## 2018-08-24 DIAGNOSIS — R94.31 ABNORMAL EKG: ICD-10-CM

## 2018-08-24 DIAGNOSIS — I49.3 PVC (PREMATURE VENTRICULAR CONTRACTION): ICD-10-CM

## 2018-08-24 PROCEDURE — 93225 XTRNL ECG REC<48 HRS REC: CPT

## 2018-08-24 PROCEDURE — 93226 XTRNL ECG REC<48 HR SCAN A/R: CPT

## 2018-08-29 PROCEDURE — 93227 XTRNL ECG REC<48 HR R&I: CPT | Performed by: INTERNAL MEDICINE

## 2018-08-30 ENCOUNTER — HOSPITAL ENCOUNTER (OUTPATIENT)
Dept: NON INVASIVE DIAGNOSTICS | Facility: CLINIC | Age: 58
Discharge: HOME | End: 2018-08-30
Payer: COMMERCIAL

## 2018-08-30 DIAGNOSIS — I49.3 VENTRICULAR PREMATURE BEATS: ICD-10-CM

## 2018-08-30 DIAGNOSIS — R93.1 ABNORMAL ECHOCARDIOGRAM: ICD-10-CM

## 2018-08-30 PROCEDURE — 93016 CV STRESS TEST SUPVJ ONLY: CPT | Performed by: INTERNAL MEDICINE

## 2018-08-30 PROCEDURE — 93017 CV STRESS TEST TRACING ONLY: CPT

## 2018-08-30 PROCEDURE — 93018 CV STRESS TEST I&R ONLY: CPT | Performed by: INTERNAL MEDICINE

## 2018-08-31 ENCOUNTER — TELEPHONE (OUTPATIENT)
Dept: CARDIOLOGY CLINIC | Facility: CLINIC | Age: 58
End: 2018-08-31

## 2018-08-31 ENCOUNTER — APPOINTMENT (OUTPATIENT)
Dept: NON INVASIVE DIAGNOSTICS | Facility: HOSPITAL | Age: 58
End: 2018-08-31
Attending: INTERNAL MEDICINE
Payer: COMMERCIAL

## 2018-08-31 ENCOUNTER — HOSPITAL ENCOUNTER (OUTPATIENT)
Dept: NON INVASIVE DIAGNOSTICS | Facility: HOSPITAL | Age: 58
Discharge: HOME | End: 2018-08-31
Attending: INTERNAL MEDICINE
Payer: COMMERCIAL

## 2018-08-31 DIAGNOSIS — R42 DIZZINESS: ICD-10-CM

## 2018-08-31 DIAGNOSIS — R94.31 ABNORMAL EKG: ICD-10-CM

## 2018-08-31 DIAGNOSIS — I10 ESSENTIAL HYPERTENSION: ICD-10-CM

## 2018-08-31 LAB
CHEST PAIN STATEMENT: NORMAL
MAX DIASTOLIC BP: 90 MMHG
MAX HEART RATE: 146 BPM
MAX PREDICTED HEART RATE: 164 BPM
MAX. SYSTOLIC BP: 164 MMHG
PROTOCOL NAME: NORMAL
REASON FOR TERMINATION: NORMAL
TARGET HR FORMULA: NORMAL
TEST INDICATION: NORMAL
TIME IN EXERCISE PHASE: NORMAL

## 2018-08-31 PROCEDURE — 93306 TTE W/DOPPLER COMPLETE: CPT

## 2018-08-31 PROCEDURE — 93306 TTE W/DOPPLER COMPLETE: CPT | Performed by: INTERNAL MEDICINE

## 2018-08-31 NOTE — TELEPHONE ENCOUNTER
Spoke with patient regard Holter monitor results  ----- Message from Dary Reyna MD sent at 8/30/2018  3:28 PM EDT -----  Results are within range, please notify patient

## 2018-09-04 ENCOUNTER — OFFICE VISIT (OUTPATIENT)
Dept: CARDIOLOGY CLINIC | Facility: CLINIC | Age: 58
End: 2018-09-04
Payer: COMMERCIAL

## 2018-09-04 VITALS
BODY MASS INDEX: 43.03 KG/M2 | HEIGHT: 70 IN | HEART RATE: 94 BPM | SYSTOLIC BLOOD PRESSURE: 138 MMHG | DIASTOLIC BLOOD PRESSURE: 78 MMHG | OXYGEN SATURATION: 94 % | WEIGHT: 300.6 LBS

## 2018-09-04 DIAGNOSIS — R94.31 ABNORMAL EKG: ICD-10-CM

## 2018-09-04 DIAGNOSIS — I10 ESSENTIAL HYPERTENSION: ICD-10-CM

## 2018-09-04 DIAGNOSIS — I49.3 PVC (PREMATURE VENTRICULAR CONTRACTION): Primary | ICD-10-CM

## 2018-09-04 PROCEDURE — 99214 OFFICE O/P EST MOD 30 MIN: CPT | Performed by: INTERNAL MEDICINE

## 2018-09-04 NOTE — PROGRESS NOTES
Cardiology Consultation     Oleg Noss  [id number]  1960  [place] [facility] [address]  [place] [address] [facility] [address] [address]    1  PVC (premature ventricular contraction)     2  Essential hypertension     3  Abnormal EKG       Chief Complaint   Patient presents with    Follow-up     follow up testing no complaints at this time     HPI: patient is here for evaluation of abnormal EKG done at PCP's office that revealed a prior infarct, LVH, and PVC  He has no cardiac complaints at this time        Patient Active Problem List   Diagnosis    Adenomatous colon polyp    Abnormal EKG    Anxiety    Benign prostatic hyperplasia with lower urinary tract symptoms    Dizziness    Hypertension    Impaired fasting blood sugar    Morbid obesity with BMI of 40 0-44 9, adult (HCC)    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    Renal insufficiency    Sleep apnea    Urge and stress incontinence    Increased frequency of urination    Class 3 obesity with body mass index (BMI) of 40 0 to 44 9 in adult (HCC)    PVC (premature ventricular contraction)     Past Medical History:   Diagnosis Date    Anxiety     BPH (benign prostatic hyperplasia)     Difficulty breathing 01/05/2011    Hypertension     Impaired fasting blood sugar     Last Assessed: 1/2/2013    Obesity     Sleep apnea      Social History     Social History    Marital status: /Civil Union     Spouse name: N/A    Number of children: N/A    Years of education: N/A     Occupational History    Cook      Social History Main Topics    Smoking status: Former Smoker     Packs/day: 0 25     Years: 30 00     Quit date: 06/2017    Smokeless tobacco: Never Used      Comment: Current every day smoker  wants to quit per [employer],  1 pack in 3 days    Alcohol use 3 6 oz/week     6 Standard drinks or equivalent per week    Drug use: No    Sexual activity: Yes     Partners: Female Comment: no new partners in the past 3months      Other Topics Concern    Not on file     Social History Narrative    Daily caffeine consumption    Dental care, regularly    Inadequate exercise    Poorly balanced diet          Family History   Problem Relation Age of Onset    Heart murmur Mother     Cerebral palsy Mother     Heart disease Mother     Sudden death Mother         Early    Breast cancer Sister 48    Pancreatic cancer Maternal Grandmother 61    Emphysema Maternal Grandfather     Hypertension Father     Prostate cancer Father     Colon cancer Neg Hx     Anuerysm Neg Hx     Arrhythmia Neg Hx     Heart failure Neg Hx     Clotting disorder Neg Hx      Past Surgical History:   Procedure Laterality Date    COLONOSCOPY      Fiberoptic; Last Assessed: 10/24/2012       Current Outpatient Prescriptions:     amLODIPine (NORVASC) 10 mg tablet, Take 1 tablet (10 mg total) by mouth daily, Disp: 30 tablet, Rfl: 0  Allergies   Allergen Reactions    Cashew Nut Oil Anaphylaxis     Annotation - [id number]: Allergic to nuts, ok with Peanuts and Almonds     Vitals:    09/04/18 1634   BP: 138/78   BP Location: Left arm   Patient Position: Sitting   Cuff Size: Large   Pulse: 94   SpO2: 94%   Weight: (!) 136 kg (300 lb 9 6 oz)   Height: 5' 10" (1 778 m)       Labs:  Hospital Outpatient Visit on 08/30/2018   Component Date Value    Protocol Name 08/30/2018 Samuel Mccormick Time In Exercise Phase 08/30/2018 00:06:19     MAX   SYSTOLIC BP 91/86/9997 558     Max Diastolic Bp 26/33/6541 90     Max Heart Rate 08/30/2018 146     Max Predicted Heart Rate 08/30/2018 164     Reason for Termination 08/30/2018 Fatigue     Test Indication 08/30/2018 Abnormal ECG     Target Hr Formular 08/30/2018 (220 - Age)*100%     Chest Pain Statement 08/30/2018 none    Appointment on 08/20/2018   Component Date Value    Sodium 08/20/2018 135*    Potassium 08/20/2018 4 3     Chloride 08/20/2018 103     CO2 08/20/2018 29     ANION GAP 08/20/2018 3*    BUN 08/20/2018 18     Creatinine 08/20/2018 1 40*    Glucose, Fasting 08/20/2018 142*    Calcium 08/20/2018 9 2     AST 08/20/2018 19     ALT 08/20/2018 24     Alkaline Phosphatase 08/20/2018 70     Total Protein 08/20/2018 8 1     Albumin 08/20/2018 3 7     Total Bilirubin 08/20/2018 0 53     eGFR 08/20/2018 64     TSH 3RD GENERATON 08/20/2018 1 550     Vit D, 25-Hydroxy 08/20/2018 24 8*    Cholesterol 08/20/2018 138     Triglycerides 08/20/2018 78     HDL, Direct 08/20/2018 36*    LDL Calculated 08/20/2018 86     Non-HDL-Chol (CHOL-HDL) 08/20/2018 102     Hemoglobin A1C 08/20/2018 7 0*    EAG 08/20/2018 154    Office Visit on 08/17/2018   Component Date Value    WBC 08/20/2018 7 02     RBC 08/20/2018 4 59     Hemoglobin 08/20/2018 13 6     Hematocrit 08/20/2018 42 9     MCV 08/20/2018 94     MCH 08/20/2018 29 6     MCHC 08/20/2018 31 7     RDW 08/20/2018 13 7     MPV 08/20/2018 12 3     Platelets 15/47/8088 177     nRBC 08/20/2018 0     Neutrophils Relative 08/20/2018 60     Immat GRANS % 08/20/2018 0     Lymphocytes Relative 08/20/2018 27     Monocytes Relative 08/20/2018 10     Eosinophils Relative 08/20/2018 3     Basophils Relative 08/20/2018 0     Neutrophils Absolute 08/20/2018 4 19     Immature Grans Absolute 08/20/2018 0 02     Lymphocytes Absolute 08/20/2018 1 86     Monocytes Absolute 08/20/2018 0 72     Eosinophils Absolute 08/20/2018 0 20     Basophils Absolute 08/20/2018 0 03     Creatinine, Ur 08/20/2018 86 6     Microalbum  ,U,Random 08/20/2018 11 5     Microalb Creat Ratio 08/20/2018 13      Lab Results   Component Value Date    TRIG 78 08/20/2018    HDL 36 (L) 08/20/2018     Imaging: No results found  Review of Systems:  Review of Systems   Constitutional: Negative for activity change, appetite change, fatigue and fever  HENT: Negative for nosebleeds and sore throat  Eyes: Negative for photophobia and visual disturbance  Respiratory: Negative for cough, chest tightness, shortness of breath and wheezing  Cardiovascular: Negative for chest pain, palpitations and leg swelling  Gastrointestinal: Negative for abdominal pain, diarrhea, nausea and vomiting  Endocrine: Negative for polyuria  Genitourinary: Negative for dysuria, frequency and hematuria  Musculoskeletal: Negative for arthralgias, back pain and gait problem  Skin: Negative for pallor and rash  Neurological: Negative for dizziness, syncope, speech difficulty and light-headedness  Hematological: Does not bruise/bleed easily  Psychiatric/Behavioral: Negative for agitation, behavioral problems and confusion  Physical Exam:  Physical Exam   Constitutional: He is oriented to person, place, and time  He appears well-developed and well-nourished  No distress  HENT:   Head: Normocephalic and atraumatic  Nose: Nose normal    Eyes: EOM are normal  Pupils are equal, round, and reactive to light  No scleral icterus  Neck: Normal range of motion  Neck supple  No JVD present  Cardiovascular: Normal rate, regular rhythm, S1 normal and S2 normal   Exam reveals no gallop, no S3, no distant heart sounds and no friction rub  No murmur heard  No systolic murmur is present   Pulmonary/Chest: Effort normal and breath sounds normal  No respiratory distress  He has no wheezes  He has no rales  Abdominal: Soft  Bowel sounds are normal  He exhibits no distension  Musculoskeletal: He exhibits no edema or deformity  Neurological: He is alert and oriented to person, place, and time  No cranial nerve deficit  Skin: Skin is warm and dry  He is not diaphoretic  No erythema  Psychiatric: He has a normal mood and affect  His behavior is normal    Vitals reviewed      EKG:  Normal sinus rhythm with 1st degree AV block with premature atrial contractions in a pattern of bigeminy  Cannot rule out anterior infarct, age undetermined  Abnormal ECG    Discussion/Summary:  Abnormal EKG: we screened for cardiac issues with a stress test that ruled out ischemic disease, an echo that ruled out structural heart disease, and a Holter that revealed a normal PVC burden  HTN: relatively well controlled on amlodipine

## 2018-09-04 NOTE — PATIENT INSTRUCTIONS

## 2018-09-06 ENCOUNTER — OFFICE VISIT (OUTPATIENT)
Dept: FAMILY MEDICINE CLINIC | Facility: CLINIC | Age: 58
End: 2018-09-06
Payer: COMMERCIAL

## 2018-09-06 VITALS
HEIGHT: 70 IN | OXYGEN SATURATION: 96 % | HEART RATE: 84 BPM | SYSTOLIC BLOOD PRESSURE: 140 MMHG | BODY MASS INDEX: 42.8 KG/M2 | DIASTOLIC BLOOD PRESSURE: 82 MMHG | WEIGHT: 299 LBS

## 2018-09-06 DIAGNOSIS — E55.9 VITAMIN D DEFICIENCY: ICD-10-CM

## 2018-09-06 DIAGNOSIS — G47.30 SLEEP APNEA, UNSPECIFIED TYPE: ICD-10-CM

## 2018-09-06 DIAGNOSIS — E11.9 TYPE 2 DIABETES MELLITUS WITHOUT COMPLICATION, WITHOUT LONG-TERM CURRENT USE OF INSULIN (HCC): Primary | ICD-10-CM

## 2018-09-06 DIAGNOSIS — I10 ESSENTIAL HYPERTENSION: ICD-10-CM

## 2018-09-06 DIAGNOSIS — IMO0001 CLASS 3 OBESITY WITH BODY MASS INDEX (BMI) OF 40.0 TO 44.9 IN ADULT, UNSPECIFIED OBESITY TYPE, UNSPECIFIED WHETHER SERIOUS COMORBIDITY PRESENT: ICD-10-CM

## 2018-09-06 PROCEDURE — 1036F TOBACCO NON-USER: CPT | Performed by: FAMILY MEDICINE

## 2018-09-06 PROCEDURE — 99214 OFFICE O/P EST MOD 30 MIN: CPT | Performed by: FAMILY MEDICINE

## 2018-09-06 RX ORDER — ERGOCALCIFEROL 1.25 MG/1
50000 CAPSULE ORAL WEEKLY
Qty: 12 CAPSULE | Refills: 1 | Status: SHIPPED | OUTPATIENT
Start: 2018-09-06 | End: 2019-01-18

## 2018-09-06 NOTE — PROGRESS NOTES
Assessment/Plan:    Problem List Items Addressed This Visit        Endocrine    Type 2 diabetes mellitus without complication, without long-term current use of insulin ([facility] [address] ) - Primary     Lab Results   Component Value Date    HGBA1C 7 0 (H) 08/20/2018     New new onset of diabetes, discussed with him low carb diet losing weight and exercise and advised to see dietitian but he does not want to he says he knows  Advised to have eye exam done and then he will follow-up labs and start low dose metformin, he has mild renal insufficiency will monitor his labs in 3 months  Side effect of medication discussed with him   No results for input(s): POCGLU in the last 72 hours  Blood Sugar Average: Last 72 hrs:           Relevant Medications    metFORMIN (GLUCOPHAGE) 500 mg tablet    Other Relevant Orders    CBC and differential    Comprehensive metabolic panel    Hemoglobin A1C    TSH, 3rd generation       Respiratory    Sleep apnea     He is on CPAP machine            Cardiovascular and Mediastinum    Hypertension       Other    Class 3 obesity with body mass index (BMI) of 40 0 to 44 9 in [facility])     Discussed about weight loss low carb diet and exercise         Vitamin D deficiency     Start him on high dose vitamin-D         Relevant Medications    ergocalciferol (VITAMIN D2) 50,000 units          Chief Complaint   Patient presents with    Follow-up       Subjective:   Patient ID: Nnamdi Key is a 66 y o  male  He is here follow-up on his labs, he saw cardiologist few days ago and his stress test and echocardiogram is normal, he has been eating poorly and his blood sugar has been going up, he has chronic renal insufficiency, he denies any headache chest pain shortness of breath and denies any leg swelling  He has hypertension he is on amlodipine        Review of Systems   Constitutional: Negative for activity change, appetite change, chills, diaphoresis, fatigue, fever and unexpected weight change     HENT: Negative for congestion, dental problem, ear discharge, ear pain, facial swelling, hearing loss, mouth sores, nosebleeds, postnasal drip, rhinorrhea, sinus pain, sinus pressure, sneezing, sore throat, trouble swallowing and voice change  Eyes: Negative for photophobia, pain, discharge, redness and itching  Respiratory: Negative for cough, chest tightness, shortness of breath and wheezing  Cardiovascular: Negative for chest pain, palpitations and leg swelling  Gastrointestinal: Negative for abdominal distention, abdominal pain, blood in stool, constipation, diarrhea and nausea  Endocrine: Negative for cold intolerance, heat intolerance, polydipsia, polyphagia and polyuria  Genitourinary: Negative for dysuria, flank pain, frequency, hematuria and urgency  Musculoskeletal: Negative for arthralgias, back pain, myalgias and neck pain  Skin: Negative for color change and pallor  Allergic/Immunologic: Negative for environmental allergies and food allergies  Neurological: Negative for dizziness, weakness, light-headedness, numbness and headaches  Hematological: Negative for adenopathy  Does not bruise/bleed easily  Psychiatric/Behavioral: Negative for behavioral problems, sleep disturbance and suicidal ideas  The patient is not nervous/anxious  Objective:  Physical Exam   Constitutional: He is oriented to person, place, and time  He appears well-developed and well-nourished  HENT:   Head: Normocephalic and atraumatic  Mouth/Throat: No oropharyngeal exudate  Eyes: Conjunctivae and EOM are normal  Pupils are equal, round, and reactive to light  Right eye exhibits no discharge  Left eye exhibits no discharge  No scleral icterus  Neck: Normal range of motion  Neck supple  No tracheal deviation present  No thyromegaly present  Cardiovascular: Normal rate, regular rhythm and normal heart sounds  No murmur heard    Pulmonary/Chest: Effort normal and breath sounds normal  No respiratory distress  He has no wheezes  He has no rales  Abdominal: Soft  Bowel sounds are normal  He exhibits no distension and no mass  There is no tenderness  There is no rebound  Musculoskeletal: Normal range of motion  He exhibits no edema  Lymphadenopathy:     He has no cervical adenopathy  Neurological: He is alert and oriented to person, place, and time  He has normal reflexes  No cranial nerve deficit  Skin: Skin is warm  No rash noted  No erythema  No pallor  Psychiatric: He has a normal mood and affect  His behavior is normal  Judgment and thought content normal    Nursing note and vitals reviewed  Past Surgical History:   Procedure Laterality Date    COLONOSCOPY      Fiberoptic; Last Assessed: 10/24/2012       Family History   Problem Relation Age of Onset    Heart murmur Mother     Cerebral palsy Mother     Heart disease Mother    Ron Hero Sudden death Mother         Early   Ron Hero Breast cancer Sister 48    Pancreatic cancer Maternal Grandmother 61    Emphysema Maternal Grandfather     Hypertension Father     Prostate cancer Father     Colon cancer Neg Hx     Anuerysm Neg Hx     Arrhythmia Neg Hx     Heart failure Neg Hx     Clotting disorder Neg Hx          Current Outpatient Prescriptions:     amLODIPine (NORVASC) 10 mg tablet, Take 1 tablet (10 mg total) by mouth daily, Disp: 30 tablet, Rfl: 0    ergocalciferol (VITAMIN D2) 50,000 units, Take 1 capsule (50,000 Units total) by mouth once a week for 90 days, Disp: 12 capsule, Rfl: 1    metFORMIN (GLUCOPHAGE) 500 mg tablet, Take 1 tablet (500 mg total) by mouth 2 (two) times a day with meals, Disp: 60 tablet, Rfl: 4    Allergies   Allergen Reactions    Cashew Nut Oil Anaphylaxis     Annotation - [id number]:  Allergic to nuts, ok with Peanuts and Almonds       Vitals:    09/06/18 1530   BP: 140/82   Pulse: 84   SpO2: 96%   Weight: 136 kg (299 lb)   Height: 5' 10" (1 778 m)

## 2018-09-06 NOTE — ASSESSMENT & PLAN NOTE
Lab Results   Component Value Date    HGBA1C 7 0 (H) 08/20/2018     New new onset of diabetes, discussed with him low carb diet losing weight and exercise and advised to see dietitian but he does not want to he says he knows  Advised to have eye exam done and then he will follow-up labs and start low dose metformin, he has mild renal insufficiency will monitor his labs in 3 months  Side effect of medication discussed with him   No results for input(s): POCGLU in the last 72 hours      Blood Sugar Average: Last 72 hrs:

## 2018-09-13 DIAGNOSIS — I10 ESSENTIAL HYPERTENSION: ICD-10-CM

## 2018-09-13 RX ORDER — AMLODIPINE BESYLATE 10 MG/1
10 TABLET ORAL DAILY
Qty: 30 TABLET | Refills: 0 | Status: SHIPPED | OUTPATIENT
Start: 2018-09-13 | End: 2018-11-01 | Stop reason: SDUPTHER

## 2018-09-13 NOTE — TELEPHONE ENCOUNTER
Pt called and was sent to script line he called right back and did not want to leave any messages  He states that he needs a refill of his Amlodipine 10 mg   [facility]

## 2018-09-21 ENCOUNTER — TELEPHONE (OUTPATIENT)
Dept: CARDIOLOGY CLINIC | Facility: CLINIC | Age: 58
End: 2018-09-21

## 2018-09-21 NOTE — TELEPHONE ENCOUNTER
Called patient many times, unavailable to contact, sent letter out      ----- Message from Tuan Haney MD sent at 8/30/2018  3:28 PM EDT -----  Results are within range, please notify patient

## 2018-11-01 DIAGNOSIS — I10 ESSENTIAL HYPERTENSION: ICD-10-CM

## 2018-11-01 RX ORDER — AMLODIPINE BESYLATE 10 MG/1
TABLET ORAL
Qty: 30 TABLET | Refills: 0 | Status: SHIPPED | OUTPATIENT
Start: 2018-11-01 | End: 2019-01-10 | Stop reason: SDUPTHER

## 2018-12-11 PROBLEM — IMO0001 CLASS 3 OBESITY WITH BODY MASS INDEX (BMI) OF 40.0 TO 44.9 IN ADULT: Status: RESOLVED | Noted: 2018-08-17 | Resolved: 2018-12-11

## 2019-01-10 ENCOUNTER — OFFICE VISIT (OUTPATIENT)
Dept: FAMILY MEDICINE CLINIC | Facility: CLINIC | Age: 59
End: 2019-01-10
Payer: COMMERCIAL

## 2019-01-10 VITALS
HEART RATE: 77 BPM | SYSTOLIC BLOOD PRESSURE: 158 MMHG | BODY MASS INDEX: 42.66 KG/M2 | DIASTOLIC BLOOD PRESSURE: 88 MMHG | OXYGEN SATURATION: 96 % | WEIGHT: 298 LBS | HEIGHT: 70 IN

## 2019-01-10 DIAGNOSIS — E11.9 TYPE 2 DIABETES MELLITUS WITHOUT COMPLICATION, WITHOUT LONG-TERM CURRENT USE OF INSULIN (HCC): Primary | ICD-10-CM

## 2019-01-10 DIAGNOSIS — G47.30 SLEEP APNEA, UNSPECIFIED TYPE: ICD-10-CM

## 2019-01-10 DIAGNOSIS — E66.01 MORBID OBESITY WITH BMI OF 40.0-44.9, ADULT (HCC): ICD-10-CM

## 2019-01-10 DIAGNOSIS — I10 ESSENTIAL HYPERTENSION: ICD-10-CM

## 2019-01-10 PROCEDURE — 99214 OFFICE O/P EST MOD 30 MIN: CPT | Performed by: FAMILY MEDICINE

## 2019-01-10 PROCEDURE — 4010F ACE/ARB THERAPY RXD/TAKEN: CPT | Performed by: FAMILY MEDICINE

## 2019-01-10 RX ORDER — AMLODIPINE BESYLATE 10 MG/1
10 TABLET ORAL DAILY
Qty: 30 TABLET | Refills: 3 | Status: SHIPPED | OUTPATIENT
Start: 2019-01-10 | End: 2019-06-18 | Stop reason: SDUPTHER

## 2019-01-10 RX ORDER — LISINOPRIL 5 MG/1
5 TABLET ORAL DAILY
Qty: 30 TABLET | Refills: 3 | Status: SHIPPED | OUTPATIENT
Start: 2019-01-10 | End: 2019-06-18 | Stop reason: SDUPTHER

## 2019-01-10 NOTE — ASSESSMENT & PLAN NOTE
He is out of medication restart him on amlodipine and started lisinopril, new medication side effect discussed with him and if any he can stop the medicine

## 2019-01-10 NOTE — ASSESSMENT & PLAN NOTE
Lab Results   Component Value Date    HGBA1C 7 0 (H) 08/20/2018     He has poor compliance with his medication and follow ups, his labs are due and advised to restart his medication and discussed with him all the complications of diabetes if he stays uncontrolled  He will restart his medication and have labs and follow-up in 2 weeks  No results for input(s): POCGLU in the last 72 hours      Blood Sugar Average: Last 72 hrs:

## 2019-01-10 NOTE — PROGRESS NOTES
Assessment/Plan:    Problem List Items Addressed This Visit        Endocrine    Type 2 diabetes mellitus without complication, without long-term current use of insulin ([facility] ) - Primary     Lab Results   Component Value Date    HGBA1C 7 0 (H) 08/20/2018     He has poor compliance with his medication and follow ups, his labs are due and advised to restart his medication and discussed with him all the complications of diabetes if he stays uncontrolled  He will restart his medication and have labs and follow-up in 2 weeks  No results for input(s): POCGLU in the last 72 hours  Blood Sugar Average: Last 72 hrs:           Relevant Medications    metFORMIN (GLUCOPHAGE) 500 mg tablet    lisinopril (ZESTRIL) 5 mg tablet    Other Relevant Orders    CBC and differential    Comprehensive metabolic panel    Hemoglobin A1C    Lipid panel    TSH, 3rd generation    Microalbumin / creatinine urine ratio       Respiratory    Sleep apnea     He use CPAP machine            Cardiovascular and Mediastinum    Hypertension     He is out of medication restart him on amlodipine and started lisinopril, new medication side effect discussed with him and if any he can stop the medicine         Relevant Medications    amLODIPine (NORVASC) 10 mg tablet    lisinopril (ZESTRIL) 5 mg tablet       Other    Morbid obesity with BMI of 40 0-44 9, adult ([facility] )     He has to work on his diet and he has to lose more weight               Chief Complaint   Patient presents with    Follow-up       Subjective:   Patient ID: Brian Randle is a 66 y o  male  He came back for follow-up for diabetes and hypertension and he says he has not been taking metformin for many months, and for last 2 days he has no blood pressure medicine either, he does not come back for regular follow ups, and he says he is feeling fine  He denies any headache chest pain shortness of breath, he gets slight leg swelling  Leila Yang   He says he goes to [place] and he works during night shifts and he comes back here every 2 weeks,  He has obesity and he has CPAP machine for sleep apnea          Review of Systems   Constitutional: Negative for activity change, appetite change, chills, diaphoresis, fatigue, fever and unexpected weight change  HENT: Negative for congestion, dental problem, ear discharge, ear pain, facial swelling, hearing loss, mouth sores, nosebleeds, postnasal drip, rhinorrhea, sinus pain, sinus pressure, sneezing, sore throat, trouble swallowing and voice change  Eyes: Negative for photophobia, pain, discharge, redness and itching  Respiratory: Negative for cough, chest tightness, shortness of breath and wheezing  Cardiovascular: Negative for chest pain, palpitations and leg swelling  Gastrointestinal: Negative for abdominal distention, abdominal pain, blood in stool, constipation, diarrhea and nausea  Endocrine: Negative for cold intolerance, heat intolerance, polydipsia, polyphagia and polyuria  Genitourinary: Negative for dysuria, flank pain, frequency, hematuria and urgency  Musculoskeletal: Negative for arthralgias, back pain, myalgias and neck pain  Skin: Negative for color change and pallor  Allergic/Immunologic: Negative for environmental allergies and food allergies  Neurological: Negative for dizziness, weakness, light-headedness, numbness and headaches  Hematological: Negative for adenopathy  Does not bruise/bleed easily  Psychiatric/Behavioral: Negative for behavioral problems, sleep disturbance and suicidal ideas  The patient is not nervous/anxious  Objective:  Physical Exam   Constitutional: He is oriented to person, place, and time  He appears well-developed and well-nourished  HENT:   Head: Normocephalic and atraumatic  Nose: Nose normal    Mouth/Throat: Oropharynx is clear and moist  No oropharyngeal exudate  Eyes: Conjunctivae and EOM are normal  Right eye exhibits no discharge  Left eye exhibits no discharge   No scleral icterus  Neck: Normal range of motion  Neck supple  No tracheal deviation present  No thyromegaly present  Cardiovascular: Normal rate, regular rhythm and normal heart sounds  No murmur heard  Pulmonary/Chest: Effort normal and breath sounds normal  No respiratory distress  He has no wheezes  He has no rales  Abdominal: Soft  Bowel sounds are normal  He exhibits no distension and no mass  There is no tenderness  There is no rebound  Musculoskeletal: Normal range of motion  He exhibits edema  Lymphadenopathy:     He has no cervical adenopathy  Neurological: He is alert and oriented to person, place, and time  He has normal reflexes  No cranial nerve deficit  Skin: Skin is warm  No rash noted  No erythema  No pallor  Psychiatric: He has a normal mood and affect  His behavior is normal  Judgment and thought content normal    Nursing note and vitals reviewed           Past Surgical History:   Procedure Laterality Date    COLONOSCOPY      Fiberoptic; Last Assessed: 10/24/2012       Family History   Problem Relation Age of Onset    Heart murmur Mother     Cerebral palsy Mother     Heart disease Mother    Mercedes Steven Sudden death Mother         Early   Mercedes Steven Breast cancer Sister 48    Pancreatic cancer Maternal Grandmother 61    Emphysema Maternal Grandfather     Hypertension Father     Prostate cancer Father     Colon cancer Neg Hx     Anuerysm Neg Hx     Arrhythmia Neg Hx     Heart failure Neg Hx     Clotting disorder Neg Hx          Current Outpatient Prescriptions:     amLODIPine (NORVASC) 10 mg tablet, Take 1 tablet (10 mg total) by mouth daily, Disp: 30 tablet, Rfl: 3    ergocalciferol (VITAMIN D2) 50,000 units, Take 1 capsule (50,000 Units total) by mouth once a week for 90 days, Disp: 12 capsule, Rfl: 1    lisinopril (ZESTRIL) 5 mg tablet, Take 1 tablet (5 mg total) by mouth daily, Disp: 30 tablet, Rfl: 3    metFORMIN (GLUCOPHAGE) 500 mg tablet, Take 1 tablet (500 mg total) by mouth 2 (two) times a day with meals, Disp: 60 tablet, Rfl: 3    Allergies   Allergen Reactions    Cashew Nut Oil Anaphylaxis     Annotation - [id number]:  Allergic to nuts, ok with Peanuts and Almonds       Vitals:    01/10/19 1640   BP: 158/88   Pulse: 77   SpO2: 96%   Weight: 135 kg (298 lb)   Height: 5' 10" (1 778 m)

## 2019-01-11 ENCOUNTER — APPOINTMENT (OUTPATIENT)
Dept: LAB | Facility: CLINIC | Age: 59
End: 2019-01-11
Payer: COMMERCIAL

## 2019-01-11 DIAGNOSIS — E11.9 TYPE 2 DIABETES MELLITUS WITHOUT COMPLICATION, WITHOUT LONG-TERM CURRENT USE OF INSULIN (HCC): ICD-10-CM

## 2019-01-11 LAB
ALBUMIN SERPL BCP-MCNC: 3.6 G/DL (ref 3.5–5)
ALP SERPL-CCNC: 77 U/L (ref 46–116)
ALT SERPL W P-5'-P-CCNC: 25 U/L (ref 12–78)
ANION GAP SERPL CALCULATED.3IONS-SCNC: 6 MMOL/L (ref 4–13)
AST SERPL W P-5'-P-CCNC: 19 U/L (ref 5–45)
BASOPHILS # BLD AUTO: 0.03 THOUSANDS/ΜL (ref 0–0.1)
BASOPHILS NFR BLD AUTO: 0 % (ref 0–1)
BILIRUB SERPL-MCNC: 0.45 MG/DL (ref 0.2–1)
BUN SERPL-MCNC: 18 MG/DL (ref 5–25)
CALCIUM SERPL-MCNC: 8.6 MG/DL (ref 8.3–10.1)
CHLORIDE SERPL-SCNC: 106 MMOL/L (ref 100–108)
CHOLEST SERPL-MCNC: 147 MG/DL (ref 50–200)
CO2 SERPL-SCNC: 28 MMOL/L (ref 21–32)
CREAT SERPL-MCNC: 1.55 MG/DL (ref 0.6–1.3)
CREAT UR-MCNC: 214 MG/DL
EOSINOPHIL # BLD AUTO: 0.33 THOUSAND/ΜL (ref 0–0.61)
EOSINOPHIL NFR BLD AUTO: 4 % (ref 0–6)
ERYTHROCYTE [DISTWIDTH] IN BLOOD BY AUTOMATED COUNT: 13.6 % (ref 11.6–15.1)
EST. AVERAGE GLUCOSE BLD GHB EST-MCNC: 163 MG/DL
GFR SERPL CREATININE-BSD FRML MDRD: 57 ML/MIN/1.73SQ M
GLUCOSE P FAST SERPL-MCNC: 114 MG/DL (ref 65–99)
HBA1C MFR BLD: 7.3 % (ref 4.2–6.3)
HCT VFR BLD AUTO: 42 % (ref 36.5–49.3)
HDLC SERPL-MCNC: 43 MG/DL (ref 40–60)
HGB BLD-MCNC: 12.9 G/DL (ref 12–17)
IMM GRANULOCYTES # BLD AUTO: 0.01 THOUSAND/UL (ref 0–0.2)
IMM GRANULOCYTES NFR BLD AUTO: 0 % (ref 0–2)
LDLC SERPL CALC-MCNC: 85 MG/DL (ref 0–100)
LYMPHOCYTES # BLD AUTO: 3.44 THOUSANDS/ΜL (ref 0.6–4.47)
LYMPHOCYTES NFR BLD AUTO: 43 % (ref 14–44)
MCH RBC QN AUTO: 29.3 PG (ref 26.8–34.3)
MCHC RBC AUTO-ENTMCNC: 30.7 G/DL (ref 31.4–37.4)
MCV RBC AUTO: 95 FL (ref 82–98)
MICROALBUMIN UR-MCNC: 15.8 MG/L (ref 0–20)
MICROALBUMIN/CREAT 24H UR: 7 MG/G CREATININE (ref 0–30)
MONOCYTES # BLD AUTO: 0.84 THOUSAND/ΜL (ref 0.17–1.22)
MONOCYTES NFR BLD AUTO: 11 % (ref 4–12)
NEUTROPHILS # BLD AUTO: 3.35 THOUSANDS/ΜL (ref 1.85–7.62)
NEUTS SEG NFR BLD AUTO: 42 % (ref 43–75)
NONHDLC SERPL-MCNC: 104 MG/DL
NRBC BLD AUTO-RTO: 0 /100 WBCS
PLATELET # BLD AUTO: 156 THOUSANDS/UL (ref 149–390)
PMV BLD AUTO: 12.3 FL (ref 8.9–12.7)
POTASSIUM SERPL-SCNC: 4.3 MMOL/L (ref 3.5–5.3)
PROT SERPL-MCNC: 7.7 G/DL (ref 6.4–8.2)
RBC # BLD AUTO: 4.41 MILLION/UL (ref 3.88–5.62)
SODIUM SERPL-SCNC: 140 MMOL/L (ref 136–145)
TRIGL SERPL-MCNC: 93 MG/DL
TSH SERPL DL<=0.05 MIU/L-ACNC: 1.28 UIU/ML (ref 0.36–3.74)
WBC # BLD AUTO: 8 THOUSAND/UL (ref 4.31–10.16)

## 2019-01-11 PROCEDURE — 83036 HEMOGLOBIN GLYCOSYLATED A1C: CPT

## 2019-01-11 PROCEDURE — 85025 COMPLETE CBC W/AUTO DIFF WBC: CPT

## 2019-01-11 PROCEDURE — 3061F NEG MICROALBUMINURIA REV: CPT | Performed by: FAMILY MEDICINE

## 2019-01-11 PROCEDURE — 82570 ASSAY OF URINE CREATININE: CPT | Performed by: FAMILY MEDICINE

## 2019-01-11 PROCEDURE — 80061 LIPID PANEL: CPT

## 2019-01-11 PROCEDURE — 84443 ASSAY THYROID STIM HORMONE: CPT

## 2019-01-11 PROCEDURE — 36415 COLL VENOUS BLD VENIPUNCTURE: CPT

## 2019-01-11 PROCEDURE — 82043 UR ALBUMIN QUANTITATIVE: CPT | Performed by: FAMILY MEDICINE

## 2019-01-11 PROCEDURE — 80053 COMPREHEN METABOLIC PANEL: CPT

## 2019-01-17 ENCOUNTER — TELEPHONE (OUTPATIENT)
Dept: FAMILY MEDICINE CLINIC | Facility: CLINIC | Age: 59
End: 2019-01-17

## 2019-01-17 NOTE — TELEPHONE ENCOUNTER
PT WIFE CALLED AND STATES HE HAS BEEN VOMITING FOR SEVERAL DAYS AND HAD 1 EPISODE OF BLOOD IN VOMIT  INSTUCTED THAT I WANTED HIM TO GO TO ER, SHE STATES HE IS IN [place] AND DOES NOT WANT TO GO TO ER     SHE WANTED AN APT FOR THE AM, SCHEDULED AND TOLD TO GO TO ER IF THIS HAPPENS AGAIN

## 2019-01-18 ENCOUNTER — OFFICE VISIT (OUTPATIENT)
Dept: FAMILY MEDICINE CLINIC | Facility: CLINIC | Age: 59
End: 2019-01-18
Payer: COMMERCIAL

## 2019-01-18 VITALS
BODY MASS INDEX: 42.23 KG/M2 | DIASTOLIC BLOOD PRESSURE: 100 MMHG | RESPIRATION RATE: 16 BRPM | HEART RATE: 74 BPM | WEIGHT: 295 LBS | SYSTOLIC BLOOD PRESSURE: 150 MMHG | HEIGHT: 70 IN | OXYGEN SATURATION: 98 % | TEMPERATURE: 97.8 F

## 2019-01-18 DIAGNOSIS — K52.9 GASTROENTERITIS, ACUTE: Primary | ICD-10-CM

## 2019-01-18 DIAGNOSIS — I10 ESSENTIAL HYPERTENSION: ICD-10-CM

## 2019-01-18 PROCEDURE — 99214 OFFICE O/P EST MOD 30 MIN: CPT | Performed by: FAMILY MEDICINE

## 2019-01-18 NOTE — PROGRESS NOTES
Assessment/Plan:   Diagnoses and all orders for this visit:  Gastroenteritis, acute - resolved  - advised to avoid dairy and to stick to a bland LI diet while still feeling queasy   - denies N/V/D in the office today   - encouraged hydration   - RTO PRN     Essential hypertension  - BP elevated in the office today   - of note, is on CCB and ACEI but did not take his antihypertensives today - strongly advised to do so when he returns home   - risks of MI/CVA discussed with pt and pt aware   - discussed lab results but advised pt to RTO next week for routine f/u with his PCP - pt aware and agreeable         Subjective:    Patient ID: Annie Cohen is a 66 y o  male    66yo M with PMHx of DM, HTN, renal insufficiency and obesity who presents to the office for eval of vomiting and diarrhea  - started on Monday - had 2 episodes of hemoptysis that same day  - no further episodes of hemoptysis or blood in stool   - somewhat of an appetite   - no sick contacts   - works in [place] and thinks it was something he ate on Sunday   - per pt, the vomiting and diarrhea subsided later that day/Tuesday    - went to work Wednesday night and felt OK   - however, stomach still does not feel right, feels "queasy", no cramps   - had coffee this AM with cream and sugar   - ate hotdog and potato salads (store bought)   - has not taken his antihypertensives this AM - was started on ACEI on 1/10   - did get his labs done and due to follow up with his PCP next week       The following portions of the patient's history were reviewed and updated as appropriate: allergies, current medications, past family history, past medical history, past social history, past surgical history and problem list     Review of Systems  as per HPI    Objective:  /100   Pulse 74   Temp 97 8 °F (36 6 °C)   Resp 16   Ht 5' 10" (1 778 m)   Wt 134 kg (295 lb)   SpO2 98%   BMI 42 33 kg/m²    Physical Exam   Constitutional: He is oriented to person, place, and time  He appears well-developed and well-nourished  No distress  HENT:   Head: Normocephalic and atraumatic  Right Ear: External ear normal    Left Ear: External ear normal    Nose: Nose normal    Eyes: Conjunctivae and EOM are normal  Right eye exhibits no discharge  Left eye exhibits no discharge  No scleral icterus  Neck: Normal range of motion  Neck supple  Cardiovascular: Normal rate, regular rhythm and normal heart sounds  Exam reveals no gallop and no friction rub  No murmur heard  Pulmonary/Chest: Effort normal and breath sounds normal  No respiratory distress  He has no wheezes  He has no rales  Abdominal: Soft  Bowel sounds are normal  He exhibits no distension  There is no tenderness  There is no rebound and no guarding  BMI 42   Musculoskeletal: Normal range of motion  He exhibits no edema or tenderness  Neurological: He is alert and oriented to person, place, and time  Skin: He is not diaphoretic  Psychiatric: He has a normal mood and affect  His behavior is normal  Judgment and thought content normal    Vitals reviewed

## 2019-02-07 ENCOUNTER — OFFICE VISIT (OUTPATIENT)
Dept: FAMILY MEDICINE CLINIC | Facility: CLINIC | Age: 59
End: 2019-02-07
Payer: COMMERCIAL

## 2019-02-07 VITALS
SYSTOLIC BLOOD PRESSURE: 140 MMHG | OXYGEN SATURATION: 97 % | BODY MASS INDEX: 42.52 KG/M2 | DIASTOLIC BLOOD PRESSURE: 80 MMHG | WEIGHT: 297 LBS | HEART RATE: 73 BPM | HEIGHT: 70 IN | RESPIRATION RATE: 16 BRPM

## 2019-02-07 DIAGNOSIS — I10 ESSENTIAL HYPERTENSION: ICD-10-CM

## 2019-02-07 DIAGNOSIS — E11.9 TYPE 2 DIABETES MELLITUS WITHOUT COMPLICATION, WITHOUT LONG-TERM CURRENT USE OF INSULIN (HCC): Primary | ICD-10-CM

## 2019-02-07 DIAGNOSIS — N28.9 RENAL INSUFFICIENCY: ICD-10-CM

## 2019-02-07 DIAGNOSIS — G47.30 SLEEP APNEA, UNSPECIFIED TYPE: ICD-10-CM

## 2019-02-07 PROCEDURE — 3008F BODY MASS INDEX DOCD: CPT | Performed by: FAMILY MEDICINE

## 2019-02-07 PROCEDURE — 99214 OFFICE O/P EST MOD 30 MIN: CPT | Performed by: FAMILY MEDICINE

## 2019-02-07 NOTE — PROGRESS NOTES
Assessment/Plan:    Problem List Items Addressed This Visit        Endocrine    Type 2 diabetes mellitus without complication, without long-term current use of insulin ([facility] [address] ) - Primary     Lab Results   Component Value Date    HGBA1C 7 3 (H) 01/11/2019     Continue same medications continue same medication and diabetic foot exam completed today very dry skin advised to keep it moist by using lubricant  No results for input(s): POCGLU in the last 72 hours  Blood Sugar Average: Last 72 hrs:           Relevant Orders    CBC and differential    Comprehensive metabolic panel    Hemoglobin A1C    Lipid panel    Microalbumin / creatinine urine ratio       Respiratory    Sleep apnea     He is unable to sleep properly and he wakes up, he needs to see the sleep specialist         Relevant Orders    Ambulatory referral to Sleep Medicine       Cardiovascular and Mediastinum    Hypertension     Hypertension is stable now he will continue both medications and will recheck in few months            Genitourinary    Renal insufficiency     This SmartLink has not been configured with any valid records  Lab Results   Component Value Date    CREATININE 1 55 (H) 01/11/2019     He has renal insufficiency, discussed with him to increase his fluid intake regularly and avoid any dehydration, he had seen nephrologist in the past but he does not want to go back               Chief Complaint   Patient presents with    Follow-up       Subjective:   Patient ID: Aram Allen is a 66 y o  male  He is here for follow-up on his blood pressure, he was started on amlodipine last time and he says he has no side effects  Says his CPAP machine does not work properly and he cannot sleep properly and then he feels tired next day    He is diabetic takes his metformin regularly, and will get his labs in few months        Review of Systems   Constitutional: Negative for activity change, appetite change, chills, diaphoresis, fatigue, fever and unexpected weight change  HENT: Negative for congestion, dental problem, ear discharge, ear pain, facial swelling, hearing loss, mouth sores, nosebleeds, postnasal drip, rhinorrhea, sinus pain, sinus pressure, sneezing, sore throat, trouble swallowing and voice change  Eyes: Negative for photophobia, pain, discharge, redness and itching  Respiratory: Negative for cough, chest tightness, shortness of breath and wheezing  Cardiovascular: Negative for chest pain, palpitations and leg swelling  Gastrointestinal: Negative for abdominal distention, abdominal pain, blood in stool, constipation, diarrhea and nausea  Endocrine: Negative for cold intolerance, heat intolerance, polydipsia, polyphagia and polyuria  Genitourinary: Negative for dysuria, flank pain, frequency, hematuria and urgency  Musculoskeletal: Negative for arthralgias, back pain, myalgias and neck pain  Skin: Negative for color change and pallor  Allergic/Immunologic: Negative for environmental allergies and food allergies  Neurological: Negative for dizziness, weakness, light-headedness, numbness and headaches  Hematological: Negative for adenopathy  Does not bruise/bleed easily  Psychiatric/Behavioral: Negative for behavioral problems, sleep disturbance and suicidal ideas  The patient is not nervous/anxious  Objective:  Physical Exam   Constitutional: He is oriented to person, place, and time  He appears well-developed and well-nourished  HENT:   Head: Normocephalic and atraumatic  Right Ear: External ear normal    Left Ear: External ear normal    Nose: Nose normal    Mouth/Throat: Oropharynx is clear and moist  No oropharyngeal exudate  Eyes: Pupils are equal, round, and reactive to light  Conjunctivae and EOM are normal  Right eye exhibits no discharge  Left eye exhibits no discharge  No scleral icterus  Neck: Normal range of motion  Neck supple  No tracheal deviation present  No thyromegaly present  Cardiovascular: Normal rate, regular rhythm and normal heart sounds  No murmur heard  Pulses:       Dorsalis pedis pulses are 2+ on the right side, and 2+ on the left side  Pulmonary/Chest: Effort normal and breath sounds normal  No respiratory distress  He has no wheezes  He has no rales  Abdominal: Soft  Bowel sounds are normal  He exhibits no distension and no mass  There is no tenderness  There is no rebound  Musculoskeletal: Normal range of motion  He exhibits no edema  Feet:   Right Foot:   Skin Integrity: Positive for dry skin  Negative for ulcer, skin breakdown, erythema, warmth or callus  Left Foot:   Skin Integrity: Positive for dry skin  Negative for ulcer, skin breakdown, erythema, warmth or callus  Lymphadenopathy:     He has no cervical adenopathy  Neurological: He is alert and oriented to person, place, and time  He has normal reflexes  No cranial nerve deficit  Skin: Skin is warm  No rash noted  No erythema  No pallor  Psychiatric: He has a normal mood and affect  His behavior is normal  Judgment and thought content normal    Nursing note and vitals reviewed  Patient's shoes and socks removed  Right Foot/Ankle   Right Foot Inspection  Skin Exam: skin normal and dry skin skin not intact, no warmth, no callus, no erythema, no maceration, no abnormal color, no pre-ulcer, no ulcer and no callus                          Toe Exam: ROM and strength within normal limits  Sensory   Vibration: intact  Proprioception: intact   Monofilament testing: intact  Vascular  Capillary refills: < 3 seconds  The right DP pulse is 2+       Left Foot/Ankle  Left Foot Inspection  Skin Exam: skin normal and dry skinskin not intact, no warmth, no erythema, no maceration, normal color, no pre-ulcer, no ulcer and no callus                         Toe Exam: ROM and strength within normal limits                   Sensory   Vibration: intact  Proprioception: intact  Monofilament: intact  Vascular  Capillary refills: < 3 seconds  The left DP pulse is 2+  Assign Risk Category:  ; ;        Risk: 0        Past Surgical History:   Procedure Laterality Date    COLONOSCOPY      Fiberoptic; Last Assessed: 10/24/2012       Family History   Problem Relation Age of Onset    Heart murmur Mother     Cerebral palsy Mother     Heart disease Mother    Mercedes Steven Sudden death Mother         Early   Mercedes Steven Breast cancer Sister 48    Pancreatic cancer Maternal Grandmother 61    Emphysema Maternal Grandfather     Hypertension Father     Prostate cancer Father     Colon cancer Neg Hx     Anuerysm Neg Hx     Arrhythmia Neg Hx     Heart failure Neg Hx     Clotting disorder Neg Hx          Current Outpatient Prescriptions:     amLODIPine (NORVASC) 10 mg tablet, Take 1 tablet (10 mg total) by mouth daily, Disp: 30 tablet, Rfl: 3    lisinopril (ZESTRIL) 5 mg tablet, Take 1 tablet (5 mg total) by mouth daily, Disp: 30 tablet, Rfl: 3    metFORMIN (GLUCOPHAGE) 500 mg tablet, Take 1 tablet (500 mg total) by mouth 2 (two) times a day with meals, Disp: 60 tablet, Rfl: 3    Allergies   Allergen Reactions    Cashew Nut Oil Anaphylaxis     [facility] - [id number]:  Allergic to nuts, ok with Peanuts and Almonds       Vitals:    02/07/19 1458   BP: 140/80   Pulse: 73   Resp: 16   SpO2: 97%   Weight: 135 kg (297 lb)   Height: 5' 10" (1 778 m)

## 2019-02-07 NOTE — ASSESSMENT & PLAN NOTE
This SmartLink has not been configured with any valid records       Lab Results   Component Value Date    CREATININE 1 55 (H) 01/11/2019     He has renal insufficiency, discussed with him to increase his fluid intake regularly and avoid any dehydration, he had seen nephrologist in the past but he does not want to go back

## 2019-02-07 NOTE — ASSESSMENT & PLAN NOTE
Lab Results   Component Value Date    HGBA1C 7 3 (H) 01/11/2019     Continue same medications continue same medication and diabetic foot exam completed today very dry skin advised to keep it moist by using lubricant  No results for input(s): POCGLU in the last 72 hours      Blood Sugar Average: Last 72 hrs:

## 2019-04-11 ENCOUNTER — OFFICE VISIT (OUTPATIENT)
Dept: SLEEP CENTER | Facility: CLINIC | Age: 59
End: 2019-04-11
Payer: COMMERCIAL

## 2019-04-11 VITALS
HEIGHT: 68 IN | WEIGHT: 313 LBS | DIASTOLIC BLOOD PRESSURE: 90 MMHG | SYSTOLIC BLOOD PRESSURE: 148 MMHG | BODY MASS INDEX: 47.44 KG/M2

## 2019-04-11 DIAGNOSIS — G47.33 OSA (OBSTRUCTIVE SLEEP APNEA): Primary | ICD-10-CM

## 2019-04-11 DIAGNOSIS — G47.30 SLEEP APNEA, UNSPECIFIED TYPE: ICD-10-CM

## 2019-04-11 PROCEDURE — 99244 OFF/OP CNSLTJ NEW/EST MOD 40: CPT | Performed by: INTERNAL MEDICINE

## 2019-06-18 ENCOUNTER — APPOINTMENT (OUTPATIENT)
Dept: LAB | Facility: CLINIC | Age: 59
End: 2019-06-18
Payer: COMMERCIAL

## 2019-06-18 ENCOUNTER — OFFICE VISIT (OUTPATIENT)
Dept: FAMILY MEDICINE CLINIC | Facility: CLINIC | Age: 59
End: 2019-06-18
Payer: COMMERCIAL

## 2019-06-18 VITALS
DIASTOLIC BLOOD PRESSURE: 78 MMHG | RESPIRATION RATE: 18 BRPM | SYSTOLIC BLOOD PRESSURE: 128 MMHG | BODY MASS INDEX: 46.98 KG/M2 | HEART RATE: 90 BPM | OXYGEN SATURATION: 98 % | HEIGHT: 68 IN | WEIGHT: 310 LBS

## 2019-06-18 DIAGNOSIS — I10 ESSENTIAL HYPERTENSION: ICD-10-CM

## 2019-06-18 DIAGNOSIS — E11.9 TYPE 2 DIABETES MELLITUS WITHOUT COMPLICATION, WITHOUT LONG-TERM CURRENT USE OF INSULIN (HCC): Primary | ICD-10-CM

## 2019-06-18 DIAGNOSIS — Z12.11 SCREENING FOR COLON CANCER: ICD-10-CM

## 2019-06-18 DIAGNOSIS — E11.9 TYPE 2 DIABETES MELLITUS WITHOUT COMPLICATION, WITHOUT LONG-TERM CURRENT USE OF INSULIN (HCC): ICD-10-CM

## 2019-06-18 DIAGNOSIS — Z11.59 NEED FOR HEPATITIS C SCREENING TEST: ICD-10-CM

## 2019-06-18 DIAGNOSIS — G47.30 SLEEP APNEA, UNSPECIFIED TYPE: ICD-10-CM

## 2019-06-18 LAB
ALBUMIN SERPL BCP-MCNC: 3.8 G/DL (ref 3.5–5)
ALP SERPL-CCNC: 79 U/L (ref 46–116)
ALT SERPL W P-5'-P-CCNC: 26 U/L (ref 12–78)
ANION GAP SERPL CALCULATED.3IONS-SCNC: 2 MMOL/L (ref 4–13)
AST SERPL W P-5'-P-CCNC: 16 U/L (ref 5–45)
BASOPHILS # BLD AUTO: 0.03 THOUSANDS/ΜL (ref 0–0.1)
BASOPHILS NFR BLD AUTO: 0 % (ref 0–1)
BILIRUB SERPL-MCNC: 0.22 MG/DL (ref 0.2–1)
BUN SERPL-MCNC: 19 MG/DL (ref 5–25)
CALCIUM SERPL-MCNC: 8.9 MG/DL (ref 8.3–10.1)
CHLORIDE SERPL-SCNC: 108 MMOL/L (ref 100–108)
CHOLEST SERPL-MCNC: 144 MG/DL (ref 50–200)
CO2 SERPL-SCNC: 29 MMOL/L (ref 21–32)
CREAT SERPL-MCNC: 1.5 MG/DL (ref 0.6–1.3)
CREAT UR-MCNC: 186 MG/DL
EOSINOPHIL # BLD AUTO: 0.23 THOUSAND/ΜL (ref 0–0.61)
EOSINOPHIL NFR BLD AUTO: 3 % (ref 0–6)
ERYTHROCYTE [DISTWIDTH] IN BLOOD BY AUTOMATED COUNT: 13.8 % (ref 11.6–15.1)
EST. AVERAGE GLUCOSE BLD GHB EST-MCNC: 163 MG/DL
GFR SERPL CREATININE-BSD FRML MDRD: 59 ML/MIN/1.73SQ M
GLUCOSE P FAST SERPL-MCNC: 142 MG/DL (ref 65–99)
HBA1C MFR BLD: 7.3 % (ref 4.2–6.3)
HCT VFR BLD AUTO: 39.6 % (ref 36.5–49.3)
HDLC SERPL-MCNC: 39 MG/DL (ref 40–60)
HGB BLD-MCNC: 12.4 G/DL (ref 12–17)
IMM GRANULOCYTES # BLD AUTO: 0.02 THOUSAND/UL (ref 0–0.2)
IMM GRANULOCYTES NFR BLD AUTO: 0 % (ref 0–2)
LDLC SERPL CALC-MCNC: 88 MG/DL (ref 0–100)
LYMPHOCYTES # BLD AUTO: 2.66 THOUSANDS/ΜL (ref 0.6–4.47)
LYMPHOCYTES NFR BLD AUTO: 35 % (ref 14–44)
MCH RBC QN AUTO: 30.3 PG (ref 26.8–34.3)
MCHC RBC AUTO-ENTMCNC: 31.3 G/DL (ref 31.4–37.4)
MCV RBC AUTO: 97 FL (ref 82–98)
MICROALBUMIN UR-MCNC: 11 MG/L (ref 0–20)
MICROALBUMIN/CREAT 24H UR: 6 MG/G CREATININE (ref 0–30)
MONOCYTES # BLD AUTO: 0.84 THOUSAND/ΜL (ref 0.17–1.22)
MONOCYTES NFR BLD AUTO: 11 % (ref 4–12)
NEUTROPHILS # BLD AUTO: 3.73 THOUSANDS/ΜL (ref 1.85–7.62)
NEUTS SEG NFR BLD AUTO: 51 % (ref 43–75)
NONHDLC SERPL-MCNC: 105 MG/DL
NRBC BLD AUTO-RTO: 0 /100 WBCS
PLATELET # BLD AUTO: 156 THOUSANDS/UL (ref 149–390)
PMV BLD AUTO: 11.8 FL (ref 8.9–12.7)
POTASSIUM SERPL-SCNC: 4.1 MMOL/L (ref 3.5–5.3)
PROT SERPL-MCNC: 7.5 G/DL (ref 6.4–8.2)
RBC # BLD AUTO: 4.09 MILLION/UL (ref 3.88–5.62)
SODIUM SERPL-SCNC: 139 MMOL/L (ref 136–145)
TRIGL SERPL-MCNC: 85 MG/DL
WBC # BLD AUTO: 7.51 THOUSAND/UL (ref 4.31–10.16)

## 2019-06-18 PROCEDURE — 80053 COMPREHEN METABOLIC PANEL: CPT

## 2019-06-18 PROCEDURE — 3008F BODY MASS INDEX DOCD: CPT | Performed by: FAMILY MEDICINE

## 2019-06-18 PROCEDURE — 36415 COLL VENOUS BLD VENIPUNCTURE: CPT

## 2019-06-18 PROCEDURE — 82570 ASSAY OF URINE CREATININE: CPT | Performed by: FAMILY MEDICINE

## 2019-06-18 PROCEDURE — 1036F TOBACCO NON-USER: CPT | Performed by: FAMILY MEDICINE

## 2019-06-18 PROCEDURE — 3074F SYST BP LT 130 MM HG: CPT | Performed by: FAMILY MEDICINE

## 2019-06-18 PROCEDURE — 80061 LIPID PANEL: CPT

## 2019-06-18 PROCEDURE — 3078F DIAST BP <80 MM HG: CPT | Performed by: FAMILY MEDICINE

## 2019-06-18 PROCEDURE — 85025 COMPLETE CBC W/AUTO DIFF WBC: CPT

## 2019-06-18 PROCEDURE — 4010F ACE/ARB THERAPY RXD/TAKEN: CPT | Performed by: FAMILY MEDICINE

## 2019-06-18 PROCEDURE — 3061F NEG MICROALBUMINURIA REV: CPT | Performed by: FAMILY MEDICINE

## 2019-06-18 PROCEDURE — 83036 HEMOGLOBIN GLYCOSYLATED A1C: CPT

## 2019-06-18 PROCEDURE — 82043 UR ALBUMIN QUANTITATIVE: CPT | Performed by: FAMILY MEDICINE

## 2019-06-18 PROCEDURE — 99214 OFFICE O/P EST MOD 30 MIN: CPT | Performed by: FAMILY MEDICINE

## 2019-06-18 RX ORDER — AMLODIPINE BESYLATE 10 MG/1
10 TABLET ORAL DAILY
Qty: 90 TABLET | Refills: 1 | Status: SHIPPED | OUTPATIENT
Start: 2019-06-18 | End: 2020-04-16 | Stop reason: SDUPTHER

## 2019-06-18 RX ORDER — LISINOPRIL 5 MG/1
5 TABLET ORAL DAILY
Qty: 90 TABLET | Refills: 1 | Status: SHIPPED | OUTPATIENT
Start: 2019-06-18 | End: 2020-03-30 | Stop reason: ALTCHOICE

## 2019-08-12 ENCOUNTER — TELEPHONE (OUTPATIENT)
Dept: SLEEP CENTER | Facility: CLINIC | Age: 59
End: 2019-08-12

## 2019-08-12 NOTE — TELEPHONE ENCOUNTER
Patient called & states he received a letter about 2 weeks ago stating his BiPAP machine was denied  Letter was viewed from [facility]- compliance data shows non-compliance  Patient also did not schedule a compliance follow-up as instructed  Left message for patient to call office     Advised patient he did not make compliance & also did not schedule compliance follow-up (today is day 91 after set-up)  He would like to appeal this decision      Please advise  Compliance data for last 30 days attached

## 2019-08-13 NOTE — TELEPHONE ENCOUNTER
Please check with [facility] to see if it is possible to appeal   If not, I will need to repeat a diagnostic study and then order his BiPAP again

## 2019-08-14 NOTE — TELEPHONE ENCOUNTER
Yes, Dr Linda Bui can call [facility] and speak to an [facility] physician reviewer regarding this determination  Number to call is #[phone]  I'll also upload the denial to media

## 2019-08-15 NOTE — TELEPHONE ENCOUNTER
Nothing was noted  Patient just wasn't using daily  When he uses it, he's great  But he wouldn't use it for 5-6 days straight  Compliance department tried reaching out to the patient in regards to his lack of compliances but PT never returned the call

## 2019-08-15 NOTE — TELEPHONE ENCOUNTER
On what basis would I appeal?  Has the patient had some unavoidable problem that kept him from using his device? If he has not complied and did not contact me sooner, including missing his compliance check appointment, there is nothing for me to appeal   If he would like to start the process over, I can order another study and restart the PAP

## 2019-08-16 NOTE — TELEPHONE ENCOUNTER
Left message for the patient to call back and discuss machine Writer paged and updated JORDAN Pope with recent ABG results, PO2 41%.  She will collaborate with Dr. Randhawa and contact us to discuss any changes in plan of care.  Will continue to monitor.

## 2019-08-29 NOTE — TELEPHONE ENCOUNTER
Patient would like to have sleep study ordered so that he can begin the process of obtaining a new CPAP machine  I stressed the importance of compliance, even when he is working in [place]

## 2019-09-03 DIAGNOSIS — G47.33 OSA (OBSTRUCTIVE SLEEP APNEA): Primary | ICD-10-CM

## 2019-09-03 NOTE — TELEPHONE ENCOUNTER
Home study ordered  Left message for patient to call office or central scheduling to schedule   Phone #'s provided

## 2019-10-15 ENCOUNTER — HOSPITAL ENCOUNTER (OUTPATIENT)
Dept: SLEEP CENTER | Facility: CLINIC | Age: 59
Discharge: HOME | End: 2019-10-15
Payer: COMMERCIAL

## 2019-10-15 DIAGNOSIS — G47.33 OSA (OBSTRUCTIVE SLEEP APNEA): ICD-10-CM

## 2019-10-15 PROCEDURE — G0399 HOME SLEEP TEST/TYPE 3 PORTA: HCPCS

## 2019-10-16 NOTE — PROGRESS NOTES
Home Sleep Study Documentation    Pre-Sleep Home Study:    Set-up and instructions performed by: MICHAEL Guajardo     Technician performed demonstration for Patient: yes    Return demonstration performed by Patient: yes    Written instructions provided to Patient: yes    Patient signed consent form: yes        Post-Sleep Home Study:    Additional comments by Patient: None    Home Sleep Study Failed:no:    Failure reason: N/A    Reported or Detected: N/A    Scored by: SHANNAN Hernandez

## 2019-10-22 ENCOUNTER — TELEPHONE (OUTPATIENT)
Dept: SLEEP CENTER | Facility: CLINIC | Age: 59
End: 2019-10-22

## 2019-10-22 NOTE — TELEPHONE ENCOUNTER
----- Message from Joshua Linton MD sent at 10/21/2019  9:05 PM EDT -----  BPAP Auto as previously ordered   tx

## 2019-10-22 NOTE — TELEPHONE ENCOUNTER
I spoke with patient, advised sleep study reveals severe STACY, recommend treatment with BiPAP    Patient states he still has his machine, advised will send information to [facility]     Scheduled compliance follow up for 12/19/19 at 0900 with DR Michelle Barksdale, stressed importance of keeping this appointment,    Paperwork to [facility]

## 2020-02-19 ENCOUNTER — OFFICE VISIT (OUTPATIENT)
Dept: SLEEP CENTER | Facility: CLINIC | Age: 60
End: 2020-02-19
Payer: COMMERCIAL

## 2020-02-19 VITALS
DIASTOLIC BLOOD PRESSURE: 80 MMHG | HEART RATE: 88 BPM | HEIGHT: 68 IN | BODY MASS INDEX: 47.29 KG/M2 | WEIGHT: 312 LBS | SYSTOLIC BLOOD PRESSURE: 144 MMHG

## 2020-02-19 DIAGNOSIS — G47.33 OSA (OBSTRUCTIVE SLEEP APNEA): Primary | ICD-10-CM

## 2020-02-19 PROCEDURE — 3079F DIAST BP 80-89 MM HG: CPT | Performed by: INTERNAL MEDICINE

## 2020-02-19 PROCEDURE — 3077F SYST BP >= 140 MM HG: CPT | Performed by: INTERNAL MEDICINE

## 2020-02-19 PROCEDURE — 3008F BODY MASS INDEX DOCD: CPT | Performed by: INTERNAL MEDICINE

## 2020-02-19 PROCEDURE — 99213 OFFICE O/P EST LOW 20 MIN: CPT | Performed by: INTERNAL MEDICINE

## 2020-02-19 PROCEDURE — 1036F TOBACCO NON-USER: CPT | Performed by: INTERNAL MEDICINE

## 2020-02-19 NOTE — PROGRESS NOTES
Progress Note - Sleep Center   Hallie Amezcua QYC: MRN: [id number]      Reason for Visit:  64 y  o male here for annual follow-up    Assessment:  Doing well on current therapy of BPAP Auto for very severe STACY (AHI = 87 0)  The patient has insufficient sleep, primarily because of night work  He denies problems while driving to and from [address] [facility]:  Continue same    Follow up: One year    History of Present Illness:  History of STACY on PAP therapy  Fully compliant and deriving benefit      Review of Systems      Genitourinary none   Cardiology none   Gastrointestinal none   Neurology none   Constitutional fatigue and weight change   Integumentary none   Psychiatry none   Musculoskeletal none   Pulmonary none   ENT throat clearing   Endocrine none   Hematological none         I have reviewed and updated the review of systems as necessary      Historical Information    Past Medical History:   Past Medical History:   Diagnosis Date    Anxiety     BPH (benign prostatic hyperplasia)     Difficulty breathing 2011    Hypertension     Impaired fasting blood sugar     Last Assessed: 2013    Obesity     Sleep apnea          Past Surgical History:   Past Surgical History:   Procedure Laterality Date    COLONOSCOPY      Fiberoptic; Last Assessed: 10/24/2012       Social History:   Social History     Socioeconomic History    Marital status: Single     Spouse name: None    Number of children: None    Years of education: None    Highest education level: None   Occupational History    Occupation: Cook   Social Needs    Financial resource strain: None    Food insecurity:     Worry: None     Inability: None    Transportation needs:     Medical: None     Non-medical: None   Tobacco Use    Smoking status: Former Smoker     Packs/day: 0 25     Years: 30 00     Pack years: 7 50     Last attempt to quit: 2017     Years since quittin 7    Smokeless tobacco: Former User    Tobacco comment: Current every day smoker  wants to quit per [employer],  1 pack in 3 days   Substance and Sexual Activity    Alcohol use:  Yes     Alcohol/week: 6 0 standard drinks     Types: 6 Standard drinks or equivalent per week    Drug use: No    Sexual activity: Yes     Partners: Female     Comment: no new partners in the past 3months    Lifestyle    Physical activity:     Days per week: None     Minutes per session: None    Stress: None   Relationships    Social connections:     Talks on phone: None     Gets together: None     Attends Sabianist service: None     Active member of club or organization: None     Attends meetings of clubs or organizations: None     Relationship status: None    Intimate partner violence:     Fear of current or ex partner: None     Emotionally abused: None     Physically abused: None     Forced sexual activity: None   Other Topics Concern    None   Social History Narrative    Daily caffeine consumption    Dental care, regularly    Inadequate exercise    Poorly balanced diet       Family History:   Family History   Problem Relation Age of Onset    Heart murmur Mother     Cerebral palsy Mother     Heart disease Mother     Sudden death Mother         Early    Breast cancer Sister 48    Pancreatic cancer Maternal Grandmother 61    Emphysema Maternal Grandfather     Hypertension Father     Prostate cancer Father     Colon cancer Neg Hx     Anuerysm Neg Hx     Arrhythmia Neg Hx     Heart failure Neg Hx     Clotting disorder Neg Hx        Medications/Allergies:      Current Outpatient Medications:     amLODIPine (NORVASC) 10 mg tablet, Take 1 tablet (10 mg total) by mouth daily, Disp: 90 tablet, Rfl: 1    lisinopril (ZESTRIL) 5 mg tablet, Take 1 tablet (5 mg total) by mouth daily, Disp: 90 tablet, Rfl: 1    metFORMIN (GLUCOPHAGE) 500 mg tablet, Take 1 tablet (500 mg total) by mouth 2 (two) times a day with meals, Disp: 180 tablet, Rfl: 1          Objective      Vital Signs:   Vitals:    02/19/20 1300   BP: 144/80   Pulse: 88     [place] Sleepiness Scale: Total score: 11        Physical Exam:    General: Alert, appropriate, cooperative, overweight    Head: NC/AT    Skin: Warm, dry    Neuro: No motor abnormalities, cranial nerves appear intact    Extremity: No clubbing, cyanosis      DME Provider: [facility]        Counseling / Coordination of Care   I have spent 10 minutes with the patient today in which greater than 50% of this time was spent in counseling/coordination of care regarding: equipment and compliance  Board Certified Sleep Specialist    Portions of the record may have been created with voice recognition software  Occasional wrong word or "sound a like" substitutions may have occurred due to the inherent limitations of voice recognition software  Read the chart carefully and recognize, using context, where substitutions have occurred

## 2020-03-30 ENCOUNTER — TELEMEDICINE (OUTPATIENT)
Dept: FAMILY MEDICINE CLINIC | Facility: CLINIC | Age: 60
End: 2020-03-30
Payer: COMMERCIAL

## 2020-03-30 DIAGNOSIS — I10 ESSENTIAL HYPERTENSION: ICD-10-CM

## 2020-03-30 DIAGNOSIS — E11.9 TYPE 2 DIABETES MELLITUS WITHOUT COMPLICATION, WITHOUT LONG-TERM CURRENT USE OF INSULIN (HCC): Primary | ICD-10-CM

## 2020-03-30 DIAGNOSIS — R32 URINARY INCONTINENCE, UNSPECIFIED TYPE: ICD-10-CM

## 2020-03-30 DIAGNOSIS — Z11.59 NEED FOR HEPATITIS C SCREENING TEST: ICD-10-CM

## 2020-03-30 PROCEDURE — 99214 OFFICE O/P EST MOD 30 MIN: CPT | Performed by: FAMILY MEDICINE

## 2020-03-30 RX ORDER — LOSARTAN POTASSIUM 25 MG/1
25 TABLET ORAL DAILY
Qty: 90 TABLET | Refills: 1 | Status: SHIPPED | OUTPATIENT
Start: 2020-03-30 | End: 2020-07-27 | Stop reason: SDUPTHER

## 2020-03-30 NOTE — PROGRESS NOTES
Virtual Regular Visit    Problem List Items Addressed This Visit        Endocrine    Type 2 diabetes mellitus without complication, without long-term current use of insulin ([address] ) - Primary    Relevant Medications    metFORMIN (GLUCOPHAGE) 500 mg tablet    Other Relevant Orders    Ambulatory referral to Ophthalmology    HEMOGLOBIN A1C W/ EAG ESTIMATION    CBC and differential    Comprehensive metabolic panel    Lipid panel    TSH, 3rd generation    Microalbumin / creatinine urine ratio  He has no labs for long time and he says he does not need a refill he has all the medication       Cardiovascular and Mediastinum    Essential hypertension    Relevant Medications    losartan (COZAAR) 25 mg tablet  He gets tickling cough are weeks and he is on lisinopril advised to stop lisinopril and I will change it to losartan and hopefully the cough will clear up  If it does not improve he needs to be followed up       Other    Need for hepatitis C screening test    Relevant Orders    Hepatitis C antibody    Urinary incontinence    Relevant Orders    Ambulatory referral to Urology      He gets incontinence during his sleep, advised to see the urologist     BMI Counseling: There is no height or weight on file to calculate BMI  The BMI is above normal  Nutrition recommendations include encouraging healthy choices of fruits and vegetables, decreasing fast food intake, moderation in carbohydrate intake, increasing intake of lean protein and reducing intake of cholesterol  Exercise recommendations include exercising 3-5 times per week  Reason for visit is follow-up on diabetes hypertension    Encounter provider Khushi Gallardo MD    Provider located at [address] [place] [id number]      Recent Visits  No visits were found meeting these conditions     Showing recent visits within past 7 days and meeting all other requirements     Today's Visits  Date Type Provider Dept 03/30/20 Telemedicine MD Rajan Wright   Showing today's visits and meeting all other requirements     Future Appointments  No visits were found meeting these conditions  Showing future appointments within next 150 days and meeting all other requirements        The patient was identified by name and date of birth  Kaitlin Rivero was informed that this is a telemedicine visit and that the visit is being conducted through [employer] and patient was informed that this is not a secure, HIPAA-complaint platform  he agrees to proceed     My office door was closed  No one else was in the room  He acknowledged consent and understanding of privacy and security of the video platform  The patient has agreed to participate and understands they can discontinue the visit at any time  Patient is aware this is a billable service  Subjective  Kaitlin Rivero is a 64 y o  male he has for follow-up visit, on face time, he says he works in [employer] and he has a [employer] , he denies any fever chills but he says he has cough which is tickling for even before this endemic the cough is dry and he use BiPAP machine for the sleep apnea he denies any fever  He says he get urine incontinence in his bed, he says he is asleep and he thinks he is dreaming and he is going to bathroom but he is peeing on the bed  He has history of prostate enlargement, but no difficulty in urination during daytime, he says he saw a urologist many years ago but he does not know who was the doctor  He has no chest pain shortness of breath  He is gaining weight he does not eat diabetic diet and he has no labs for long time but he says he promised he will do it tomorrow        Past Medical History:   Diagnosis Date    Anxiety     BPH (benign prostatic hyperplasia)     Difficulty breathing 01/05/2011    Hypertension     Impaired fasting blood sugar     Last Assessed: 1/2/2013    Obesity     Sleep apnea        Past Surgical History: Procedure Laterality Date    COLONOSCOPY      Fiberoptic; Last Assessed: 10/24/2012       Current Outpatient Medications   Medication Sig Dispense Refill    amLODIPine (NORVASC) 10 mg tablet Take 1 tablet (10 mg total) by mouth daily 90 tablet 1    losartan (COZAAR) 25 mg tablet Take 1 tablet (25 mg total) by mouth daily 90 tablet 1    metFORMIN (GLUCOPHAGE) 500 mg tablet Take 1 tablet (500 mg total) by mouth 2 (two) times a day with meals 180 tablet 1     No current facility-administered medications for this visit  Allergies   Allergen Reactions    Cashew Nut Oil Anaphylaxis     Annotation - [id number]: Allergic to nuts, ok with Peanuts and Almonds       Review of Systems   Constitutional: Negative for activity change, appetite change, chills, diaphoresis, fatigue, fever and unexpected weight change  HENT: Negative for congestion, dental problem, ear discharge, ear pain, facial swelling, hearing loss, mouth sores, nosebleeds, postnasal drip, rhinorrhea, sinus pressure, sinus pain, sneezing, sore throat, trouble swallowing and voice change  Eyes: Negative for photophobia, pain, discharge, redness and itching  Respiratory: Negative for cough, chest tightness, shortness of breath and wheezing  Cardiovascular: Negative for chest pain, palpitations and leg swelling  Gastrointestinal: Negative for abdominal distention, abdominal pain, blood in stool, constipation, diarrhea and nausea  Endocrine: Negative for cold intolerance, heat intolerance, polydipsia, polyphagia and polyuria  Genitourinary: Negative for dysuria, flank pain, frequency, hematuria and urgency  Musculoskeletal: Negative for arthralgias, back pain, myalgias and neck pain  Skin: Negative for color change and pallor  Allergic/Immunologic: Negative for environmental allergies and food allergies  Neurological: Negative for dizziness, weakness, light-headedness, numbness and headaches     Hematological: Negative for adenopathy  Does not bruise/bleed easily  Psychiatric/Behavioral: Negative for behavioral problems, sleep disturbance and suicidal ideas  The patient is not nervous/anxious  Physical Exam     I spent  15  minutes with the patient during this visit

## 2020-03-31 ENCOUNTER — LAB (OUTPATIENT)
Dept: LAB | Facility: CLINIC | Age: 60
End: 2020-03-31
Payer: COMMERCIAL

## 2020-03-31 DIAGNOSIS — Z11.59 NEED FOR HEPATITIS C SCREENING TEST: ICD-10-CM

## 2020-03-31 DIAGNOSIS — E11.9 TYPE 2 DIABETES MELLITUS WITHOUT COMPLICATION, WITHOUT LONG-TERM CURRENT USE OF INSULIN (HCC): ICD-10-CM

## 2020-03-31 LAB
ALBUMIN SERPL BCP-MCNC: 3.8 G/DL (ref 3.5–5)
ALP SERPL-CCNC: 63 U/L (ref 46–116)
ALT SERPL W P-5'-P-CCNC: 32 U/L (ref 12–78)
ANION GAP SERPL CALCULATED.3IONS-SCNC: 1 MMOL/L (ref 4–13)
AST SERPL W P-5'-P-CCNC: 22 U/L (ref 5–45)
BASOPHILS # BLD AUTO: 0.02 THOUSANDS/ΜL (ref 0–0.1)
BASOPHILS NFR BLD AUTO: 0 % (ref 0–1)
BILIRUB SERPL-MCNC: 0.37 MG/DL (ref 0.2–1)
BUN SERPL-MCNC: 21 MG/DL (ref 5–25)
CALCIUM SERPL-MCNC: 8.8 MG/DL (ref 8.3–10.1)
CHLORIDE SERPL-SCNC: 107 MMOL/L (ref 100–108)
CHOLEST SERPL-MCNC: 136 MG/DL (ref 50–200)
CO2 SERPL-SCNC: 30 MMOL/L (ref 21–32)
CREAT SERPL-MCNC: 1.44 MG/DL (ref 0.6–1.3)
EOSINOPHIL # BLD AUTO: 0.08 THOUSAND/ΜL (ref 0–0.61)
EOSINOPHIL NFR BLD AUTO: 2 % (ref 0–6)
ERYTHROCYTE [DISTWIDTH] IN BLOOD BY AUTOMATED COUNT: 13.9 % (ref 11.6–15.1)
EST. AVERAGE GLUCOSE BLD GHB EST-MCNC: 169 MG/DL
GFR SERPL CREATININE-BSD FRML MDRD: 61 ML/MIN/1.73SQ M
GLUCOSE P FAST SERPL-MCNC: 146 MG/DL (ref 65–99)
HBA1C MFR BLD: 7.5 %
HCT VFR BLD AUTO: 40.1 % (ref 36.5–49.3)
HCV AB SER QL: NORMAL
HDLC SERPL-MCNC: 34 MG/DL
HGB BLD-MCNC: 12.5 G/DL (ref 12–17)
IMM GRANULOCYTES # BLD AUTO: 0.01 THOUSAND/UL (ref 0–0.2)
IMM GRANULOCYTES NFR BLD AUTO: 0 % (ref 0–2)
LDLC SERPL CALC-MCNC: 82 MG/DL (ref 0–100)
LYMPHOCYTES # BLD AUTO: 1.33 THOUSANDS/ΜL (ref 0.6–4.47)
LYMPHOCYTES NFR BLD AUTO: 28 % (ref 14–44)
MCH RBC QN AUTO: 29.1 PG (ref 26.8–34.3)
MCHC RBC AUTO-ENTMCNC: 31.2 G/DL (ref 31.4–37.4)
MCV RBC AUTO: 94 FL (ref 82–98)
MONOCYTES # BLD AUTO: 0.81 THOUSAND/ΜL (ref 0.17–1.22)
MONOCYTES NFR BLD AUTO: 17 % (ref 4–12)
NEUTROPHILS # BLD AUTO: 2.45 THOUSANDS/ΜL (ref 1.85–7.62)
NEUTS SEG NFR BLD AUTO: 53 % (ref 43–75)
NONHDLC SERPL-MCNC: 102 MG/DL
NRBC BLD AUTO-RTO: 0 /100 WBCS
PLATELET # BLD AUTO: 159 THOUSANDS/UL (ref 149–390)
PMV BLD AUTO: 12 FL (ref 8.9–12.7)
POTASSIUM SERPL-SCNC: 4.5 MMOL/L (ref 3.5–5.3)
PROT SERPL-MCNC: 8.2 G/DL (ref 6.4–8.2)
RBC # BLD AUTO: 4.29 MILLION/UL (ref 3.88–5.62)
SODIUM SERPL-SCNC: 138 MMOL/L (ref 136–145)
TRIGL SERPL-MCNC: 102 MG/DL
TSH SERPL DL<=0.05 MIU/L-ACNC: 1.01 UIU/ML (ref 0.36–3.74)
WBC # BLD AUTO: 4.7 THOUSAND/UL (ref 4.31–10.16)

## 2020-03-31 PROCEDURE — 80061 LIPID PANEL: CPT

## 2020-03-31 PROCEDURE — 80053 COMPREHEN METABOLIC PANEL: CPT

## 2020-03-31 PROCEDURE — 83036 HEMOGLOBIN GLYCOSYLATED A1C: CPT

## 2020-03-31 PROCEDURE — 84443 ASSAY THYROID STIM HORMONE: CPT

## 2020-03-31 PROCEDURE — 86803 HEPATITIS C AB TEST: CPT

## 2020-03-31 PROCEDURE — 85025 COMPLETE CBC W/AUTO DIFF WBC: CPT

## 2020-03-31 PROCEDURE — 3051F HG A1C>EQUAL 7.0%<8.0%: CPT | Performed by: UROLOGY

## 2020-03-31 PROCEDURE — 36415 COLL VENOUS BLD VENIPUNCTURE: CPT

## 2020-04-01 ENCOUNTER — TELEPHONE (OUTPATIENT)
Dept: FAMILY MEDICINE CLINIC | Facility: CLINIC | Age: 60
End: 2020-04-01

## 2020-04-02 ENCOUNTER — TELEMEDICINE (OUTPATIENT)
Dept: FAMILY MEDICINE CLINIC | Facility: CLINIC | Age: 60
End: 2020-04-02
Payer: COMMERCIAL

## 2020-04-02 DIAGNOSIS — I10 ESSENTIAL HYPERTENSION: ICD-10-CM

## 2020-04-02 DIAGNOSIS — E11.9 TYPE 2 DIABETES MELLITUS WITHOUT COMPLICATION, WITHOUT LONG-TERM CURRENT USE OF INSULIN (HCC): Primary | ICD-10-CM

## 2020-04-02 DIAGNOSIS — N28.9 RENAL INSUFFICIENCY: ICD-10-CM

## 2020-04-02 PROCEDURE — 99214 OFFICE O/P EST MOD 30 MIN: CPT | Performed by: FAMILY MEDICINE

## 2020-04-15 ENCOUNTER — TELEPHONE (OUTPATIENT)
Dept: NEPHROLOGY | Facility: CLINIC | Age: 60
End: 2020-04-15

## 2020-04-16 ENCOUNTER — TELEMEDICINE (OUTPATIENT)
Dept: NEPHROLOGY | Facility: CLINIC | Age: 60
End: 2020-04-16
Payer: COMMERCIAL

## 2020-04-16 ENCOUNTER — TELEPHONE (OUTPATIENT)
Dept: NEPHROLOGY | Facility: CLINIC | Age: 60
End: 2020-04-16

## 2020-04-16 DIAGNOSIS — N13.8 BPH WITH OBSTRUCTION/LOWER URINARY TRACT SYMPTOMS: ICD-10-CM

## 2020-04-16 DIAGNOSIS — I12.9 BENIGN HYPERTENSION WITH CHRONIC KIDNEY DISEASE, STAGE III (HCC): ICD-10-CM

## 2020-04-16 DIAGNOSIS — N40.1 BPH WITH OBSTRUCTION/LOWER URINARY TRACT SYMPTOMS: ICD-10-CM

## 2020-04-16 DIAGNOSIS — N18.30 BENIGN HYPERTENSION WITH CHRONIC KIDNEY DISEASE, STAGE III (HCC): ICD-10-CM

## 2020-04-16 DIAGNOSIS — I10 ESSENTIAL HYPERTENSION: ICD-10-CM

## 2020-04-16 DIAGNOSIS — E11.22 CONTROLLED TYPE 2 DIABETES MELLITUS WITH STAGE 3 CHRONIC KIDNEY DISEASE, WITHOUT LONG-TERM CURRENT USE OF INSULIN (HCC): ICD-10-CM

## 2020-04-16 DIAGNOSIS — N18.30 CONTROLLED TYPE 2 DIABETES MELLITUS WITH STAGE 3 CHRONIC KIDNEY DISEASE, WITHOUT LONG-TERM CURRENT USE OF INSULIN (HCC): ICD-10-CM

## 2020-04-16 DIAGNOSIS — N18.30 CKD (CHRONIC KIDNEY DISEASE) STAGE 3, GFR 30-59 ML/MIN (HCC): Primary | ICD-10-CM

## 2020-04-16 PROCEDURE — 99215 OFFICE O/P EST HI 40 MIN: CPT | Performed by: INTERNAL MEDICINE

## 2020-04-16 PROCEDURE — 3066F NEPHROPATHY DOC TX: CPT | Performed by: UROLOGY

## 2020-04-16 RX ORDER — AMLODIPINE BESYLATE 10 MG/1
10 TABLET ORAL DAILY
Qty: 90 TABLET | Refills: 1 | Status: SHIPPED | OUTPATIENT
Start: 2020-04-16 | End: 2020-07-27 | Stop reason: SDUPTHER

## 2020-04-17 ENCOUNTER — TELEPHONE (OUTPATIENT)
Dept: FAMILY MEDICINE CLINIC | Facility: CLINIC | Age: 60
End: 2020-04-17

## 2020-04-27 ENCOUNTER — TELEPHONE (OUTPATIENT)
Dept: FAMILY MEDICINE CLINIC | Facility: CLINIC | Age: 60
End: 2020-04-27

## 2020-04-28 ENCOUNTER — TELEMEDICINE (OUTPATIENT)
Dept: FAMILY MEDICINE CLINIC | Facility: CLINIC | Age: 60
End: 2020-04-28
Payer: COMMERCIAL

## 2020-04-28 DIAGNOSIS — Z20.822 EXPOSURE TO COVID-19 VIRUS: Primary | ICD-10-CM

## 2020-04-28 PROCEDURE — 99213 OFFICE O/P EST LOW 20 MIN: CPT | Performed by: FAMILY MEDICINE

## 2020-05-08 ENCOUNTER — TELEPHONE (OUTPATIENT)
Dept: SLEEP CENTER | Facility: CLINIC | Age: 60
End: 2020-05-08

## 2020-05-08 DIAGNOSIS — G47.33 OSA (OBSTRUCTIVE SLEEP APNEA): Primary | ICD-10-CM

## 2020-05-13 ENCOUNTER — TELEMEDICINE (OUTPATIENT)
Dept: UROLOGY | Facility: CLINIC | Age: 60
End: 2020-05-13
Payer: COMMERCIAL

## 2020-05-13 DIAGNOSIS — R32 URINARY INCONTINENCE, UNSPECIFIED TYPE: ICD-10-CM

## 2020-05-13 DIAGNOSIS — N32.81 OAB (OVERACTIVE BLADDER): Primary | ICD-10-CM

## 2020-05-13 PROCEDURE — 99214 OFFICE O/P EST MOD 30 MIN: CPT | Performed by: UROLOGY

## 2020-05-14 ENCOUNTER — TELEPHONE (OUTPATIENT)
Dept: UROLOGY | Facility: CLINIC | Age: 60
End: 2020-05-14

## 2020-05-14 DIAGNOSIS — N32.81 OVERACTIVE BLADDER: Primary | ICD-10-CM

## 2020-05-14 RX ORDER — OXYBUTYNIN CHLORIDE 10 MG/1
10 TABLET, EXTENDED RELEASE ORAL
Qty: 30 TABLET | Refills: 3 | Status: SHIPPED | OUTPATIENT
Start: 2020-05-14 | End: 2020-07-27 | Stop reason: SDUPTHER

## 2020-05-26 ENCOUNTER — TELEPHONE (OUTPATIENT)
Dept: UROLOGY | Facility: CLINIC | Age: 60
End: 2020-05-26

## 2020-07-27 DIAGNOSIS — I10 ESSENTIAL HYPERTENSION: ICD-10-CM

## 2020-07-27 DIAGNOSIS — E11.9 TYPE 2 DIABETES MELLITUS WITHOUT COMPLICATION, WITHOUT LONG-TERM CURRENT USE OF INSULIN (HCC): ICD-10-CM

## 2020-07-27 DIAGNOSIS — N32.81 OVERACTIVE BLADDER: ICD-10-CM

## 2020-07-28 PROCEDURE — 4010F ACE/ARB THERAPY RXD/TAKEN: CPT | Performed by: UROLOGY

## 2020-07-28 RX ORDER — LOSARTAN POTASSIUM 25 MG/1
25 TABLET ORAL DAILY
Qty: 90 TABLET | Refills: 1 | Status: ON HOLD | OUTPATIENT
Start: 2020-07-28 | End: 2021-07-30 | Stop reason: SDUPTHER

## 2020-07-28 RX ORDER — OXYBUTYNIN CHLORIDE 10 MG/1
10 TABLET, EXTENDED RELEASE ORAL
Qty: 30 TABLET | Refills: 0 | Status: SHIPPED | OUTPATIENT
Start: 2020-07-28 | End: 2020-08-25

## 2020-07-28 RX ORDER — AMLODIPINE BESYLATE 10 MG/1
10 TABLET ORAL DAILY
Qty: 90 TABLET | Refills: 1 | Status: SHIPPED | OUTPATIENT
Start: 2020-07-28 | End: 2020-10-27 | Stop reason: SDUPTHER

## 2020-08-25 ENCOUNTER — OFFICE VISIT (OUTPATIENT)
Dept: UROLOGY | Facility: CLINIC | Age: 60
End: 2020-08-25
Payer: COMMERCIAL

## 2020-08-25 VITALS
RESPIRATION RATE: 20 BRPM | DIASTOLIC BLOOD PRESSURE: 94 MMHG | SYSTOLIC BLOOD PRESSURE: 150 MMHG | WEIGHT: 303 LBS | BODY MASS INDEX: 43.38 KG/M2 | HEIGHT: 70 IN | TEMPERATURE: 97.5 F | HEART RATE: 88 BPM

## 2020-08-25 DIAGNOSIS — N32.81 OVERACTIVE BLADDER: Primary | ICD-10-CM

## 2020-08-25 DIAGNOSIS — N52.9 ED (ERECTILE DYSFUNCTION) OF ORGANIC ORIGIN: ICD-10-CM

## 2020-08-25 PROCEDURE — 3008F BODY MASS INDEX DOCD: CPT | Performed by: UROLOGY

## 2020-08-25 PROCEDURE — 3066F NEPHROPATHY DOC TX: CPT | Performed by: UROLOGY

## 2020-08-25 PROCEDURE — 3080F DIAST BP >= 90 MM HG: CPT | Performed by: UROLOGY

## 2020-08-25 PROCEDURE — 3051F HG A1C>EQUAL 7.0%<8.0%: CPT | Performed by: UROLOGY

## 2020-08-25 PROCEDURE — 1036F TOBACCO NON-USER: CPT | Performed by: UROLOGY

## 2020-08-25 PROCEDURE — 3077F SYST BP >= 140 MM HG: CPT | Performed by: UROLOGY

## 2020-08-25 PROCEDURE — 99214 OFFICE O/P EST MOD 30 MIN: CPT | Performed by: UROLOGY

## 2020-08-25 RX ORDER — SILDENAFIL CITRATE 20 MG/1
20 TABLET ORAL
Qty: 30 TABLET | Refills: 11 | Status: SHIPPED | OUTPATIENT
Start: 2020-08-25 | End: 2020-10-09 | Stop reason: SDUPTHER

## 2020-08-25 NOTE — PROGRESS NOTES
Referring Physician: Riccardo Wakefield MD  A copy of this note was sent to the referring physician  Diagnoses and all orders for this visit:    Overactive bladder    ED (erectile dysfunction) of organic origin  -     sildenafil (REVATIO) 20 mg tablet; Take 1 tablet (20 mg total) by mouth daily at bedtime as needed (Take 2-4 pills 1 hour prior to use as needed) Take 2-4 tablets as needed    Other orders  -     Cancel: POCT urine dip  -     Cancel: POCT Measure PVR            Assessment and plan:       1  Intermittent urinary symptoms  -doing well without medical therapy    2  Erectile dysfunction    3  Prostate cancer screening  -negative rectal exam  - patient declined PSA screening    Regarding his erectile dysfunction, Today, we had a long and candid discussion about the multifactorial etiology of erectile dysfunction and the stepwise approach to treatment  I discussed the advantages and disadvantages of the standard treatments for erectile dysfunction including phosphodiesterase inhibitors, intracavernosal injections, urethral suppositories, vacuum erection devices, and penile implants  The patient is interested in trying oral pharmacotherapy to start  I discussed the potential side affects of these medications including, but not limited to flushing, headaches, visual changes, nasal congestion, interactions with other medications, prolonged erections, and failure to achieve an erection  He was warned never to take nitroglycerin in conjunction with medications for erectile dysfunction  I also recommended that he stagger when he takes alpha-blockers for BPH and phosphodiesterase inhibitors  If the patient fails multiple trials of the maximum strength dose, we will further discuss alternative treatment options  All of his questions were answered today and he understands the logic of this approach  Generic sildenafil prescribed  Dosing adverse effects and discussed lesion reviewed    Refills provided for 1 full year of use  He will follow up with Urology on an as-needed basis        Jackie Bartlett MD      Chief Complaint       Men's Health follow-up    History of Present Illness     Josué Cope is a 64 y o  male returns in follow-up  He recently completed virtual visit for urinary symptoms  Medical therapy was offered and he declined  Today he wishes to discuss erectile dysfunction  PSA was ordered which has not completed for prostate cancer screening    He has trouble sustaining an erection  This has become bothersome with his wife  He is on bothered by his urinary frequency  He has had no hematuria  He says he utilizes an oral testosterone supplement    Detailed Urologic History     - please refer to HPI    Review of Systems     Review of Systems   Constitutional: Negative for activity change and fatigue  HENT: Negative for congestion  Eyes: Negative for visual disturbance  Respiratory: Negative for shortness of breath and wheezing  Cardiovascular: Negative for chest pain and leg swelling  Gastrointestinal: Negative for abdominal pain  Endocrine: Positive for polyuria  Genitourinary: Negative for dysuria, flank pain, hematuria and urgency  Musculoskeletal: Negative for back pain  Allergic/Immunologic: Negative for immunocompromised state  Neurological: Negative for dizziness and numbness  Psychiatric/Behavioral: Negative for dysphoric mood  All other systems reviewed and are negative  AUA SYMPTOM SCORE      Most Recent Value   AUA SYMPTOM SCORE   How often have you had a sensation of not emptying your bladder completely after you finished urinating? 0   How often have you had to urinate again less than two hours after you finished urinating? 3   How often have you found you stopped and started again several times when you urinate?  0   How often have you found it difficult to postpone urination?   5   How often have you had a weak urinary stream?  0 How often have you had to push or strain to begin urination? 0   How many times did you most typically get up to urinate from the time you went to bed at night until the time you got up in the morning?  0   Quality of Life: If you were to spend the rest of your life with your urinary condition just the way it is now, how would you feel about that?  0   AUA SYMPTOM SCORE  8            Allergies     Allergies   Allergen Reactions    Cashew Nut Oil Anaphylaxis     Annotation - [id number]: Allergic to nuts, ok with Peanuts and Almonds       Physical Exam     Physical Exam  Constitutional:       General: He is not in acute distress  Appearance: He is well-developed  HENT:      Head: Normocephalic and atraumatic  Neck:      Musculoskeletal: Normal range of motion  Cardiovascular:      Comments: Negative lower extremity edema  Pulmonary:      Effort: Pulmonary effort is normal       Breath sounds: Normal breath sounds  Abdominal:      Palpations: Abdomen is soft  Genitourinary:     Comments: 30 g prostate no nodules or induration  Musculoskeletal: Normal range of motion  Skin:     General: Skin is warm  Neurological:      Mental Status: He is alert and oriented to person, place, and time     Psychiatric:         Behavior: Behavior normal              Vital Signs  Vitals:    08/25/20 0935   BP: 150/94   BP Location: Right arm   Patient Position: Sitting   Cuff Size: Adult   Pulse: 88   Resp: 20   Temp: 97 5 °F (36 4 °C)   Weight: (!) 137 kg (303 lb)   Height: 5' 10" (1 778 m)         Current Medications       Current Outpatient Medications:     amLODIPine (NORVASC) 10 mg tablet, Take 1 tablet (10 mg total) by mouth daily, Disp: 90 tablet, Rfl: 1    losartan (COZAAR) 25 mg tablet, Take 1 tablet (25 mg total) by mouth daily, Disp: 90 tablet, Rfl: 1    metFORMIN (GLUCOPHAGE) 500 mg tablet, Take 1 tablet (500 mg total) by mouth 2 (two) times a day with meals, Disp: 180 tablet, Rfl: 1    sildenafil (REVATIO) 20 mg tablet, Take 1 tablet (20 mg total) by mouth daily at bedtime as needed (Take 2-4 pills 1 hour prior to use as needed) Take 2-4 tablets as needed, Disp: 30 tablet, Rfl: 11    sitaGLIPtin (JANUVIA) 25 mg tablet, Take 1 tablet (25 mg total) by mouth daily (Patient not taking: Reported on 8/25/2020), Disp: 30 tablet, Rfl: 5      Active Problems     Patient Active Problem List   Diagnosis    Adenomatous colon polyp    Abnormal EKG    Anxiety    Benign prostatic hyperplasia with lower urinary tract symptoms    Dizziness    Essential hypertension    Morbid obesity with BMI of 40 0-44 9, adult [facility]    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    Renal insufficiency    STACY (obstructive sleep apnea)    Urge and stress incontinence    Increased frequency of urination    PVC (premature ventricular contraction)    Type 2 diabetes mellitus without complication, without long-term current use of insulin (HCC)    Vitamin D deficiency    Screening for colon cancer    Need for hepatitis C screening test    Urinary incontinence    Exposure to COVID-19 virus         Past Medical History     Past Medical History:   Diagnosis Date    Anxiety     BPH (benign prostatic hyperplasia)     Diabetes mellitus ([facility] )     Difficulty breathing 01/05/2011    Hypertension     Impaired fasting blood sugar     Last Assessed: 1/2/2013    Obesity     Sleep apnea          Surgical History     Past Surgical History:   Procedure Laterality Date    COLONOSCOPY      Fiberoptic; Last Assessed: 10/24/2012         Family History     Family History   Problem Relation Age of Onset    Heart murmur Mother     Cerebral palsy Mother     Heart disease Mother     Sudden death Mother         Early    Breast cancer Sister 48    Pancreatic cancer Maternal Grandmother 61    Emphysema Maternal Grandfather     Hypertension Father     Prostate cancer Father     Colon cancer Neg Hx     Anuerysm Neg Hx     Arrhythmia Neg Hx     Heart failure Neg Hx     Clotting disorder Neg Hx          Social History     Social History     Social History     Tobacco Use   Smoking Status Former Smoker    Packs/day: 0 25    Years: 30 00    Pack years: 7 50    Last attempt to quit: 06/2017    Years since quitting: 3 2   Smokeless Tobacco Former User         Pertinent Lab Values     Lab Results   Component Value Date    CREATININE 1 44 (H) 03/31/2020       Lab Results   Component Value Date    PSA 0 3 05/25/2017       @RESULTRCNT(1H])@      Pertinent Imaging      - n/a    Portions of the record may have been created with voice recognition software   Occasional wrong word or "sound a like" substitutions may have occurred due to the inherent limitations of voice recognition software   Read the chart carefully and recognize, using context, where substitutions have occurred

## 2020-08-26 ENCOUNTER — TELEPHONE (OUTPATIENT)
Dept: UROLOGY | Facility: AMBULATORY SURGERY CENTER | Age: 60
End: 2020-08-26

## 2020-08-28 NOTE — TELEPHONE ENCOUNTER
LM to patient requesting call back  Upon call back, please inform patient Sildenafil was denied with his insurance  He should call his insurance company and request a list of covered medications and then call us back so we can order him one of the allowed/covered medications to his pharmacy  Thank you

## 2020-08-28 NOTE — TELEPHONE ENCOUNTER
Sildenafil is denied with the insurance this medication is excluded from plan for diagnosis     Thanks  Nolberto Dobbs

## 2020-10-09 DIAGNOSIS — N52.9 ED (ERECTILE DYSFUNCTION) OF ORGANIC ORIGIN: ICD-10-CM

## 2020-10-09 RX ORDER — SILDENAFIL CITRATE 20 MG/1
20 TABLET ORAL
Qty: 30 TABLET | Refills: 0 | Status: SHIPPED | OUTPATIENT
Start: 2020-10-09 | End: 2021-08-05 | Stop reason: ALTCHOICE

## 2020-10-27 DIAGNOSIS — E11.9 TYPE 2 DIABETES MELLITUS WITHOUT COMPLICATION, WITHOUT LONG-TERM CURRENT USE OF INSULIN (HCC): ICD-10-CM

## 2020-10-27 DIAGNOSIS — I10 ESSENTIAL HYPERTENSION: ICD-10-CM

## 2020-10-28 RX ORDER — AMLODIPINE BESYLATE 10 MG/1
10 TABLET ORAL DAILY
Qty: 90 TABLET | Refills: 0 | Status: ON HOLD | OUTPATIENT
Start: 2020-10-28 | End: 2021-07-30 | Stop reason: SDUPTHER

## 2021-04-20 ENCOUNTER — IMMUNIZATIONS (OUTPATIENT)
Dept: FAMILY MEDICINE CLINIC | Facility: HOSPITAL | Age: 61
End: 2021-04-20

## 2021-04-20 DIAGNOSIS — Z23 ENCOUNTER FOR IMMUNIZATION: Primary | ICD-10-CM

## 2021-04-20 PROCEDURE — 0001A SARS-COV-2 / COVID-19 MRNA VACCINE (PFIZER-BIONTECH) 30 MCG: CPT

## 2021-04-20 PROCEDURE — 91300 SARS-COV-2 / COVID-19 MRNA VACCINE (PFIZER-BIONTECH) 30 MCG: CPT

## 2021-05-11 ENCOUNTER — IMMUNIZATIONS (OUTPATIENT)
Dept: FAMILY MEDICINE CLINIC | Facility: HOSPITAL | Age: 61
End: 2021-05-11

## 2021-05-11 DIAGNOSIS — Z23 ENCOUNTER FOR IMMUNIZATION: Primary | ICD-10-CM

## 2021-05-11 PROCEDURE — 0002A SARS-COV-2 / COVID-19 MRNA VACCINE (PFIZER-BIONTECH) 30 MCG: CPT

## 2021-05-11 PROCEDURE — 91300 SARS-COV-2 / COVID-19 MRNA VACCINE (PFIZER-BIONTECH) 30 MCG: CPT

## 2021-07-29 ENCOUNTER — HOSPITAL ENCOUNTER (OUTPATIENT)
Facility: HOSPITAL | Age: 61
Setting detail: OBSERVATION
LOS: 1 days | Discharge: HOME | End: 2021-07-30
Attending: EMERGENCY MEDICINE | Admitting: INTERNAL MEDICINE

## 2021-07-29 ENCOUNTER — APPOINTMENT (EMERGENCY)
Dept: MRI IMAGING | Facility: HOSPITAL | Age: 61
End: 2021-07-29

## 2021-07-29 DIAGNOSIS — R32 URINARY INCONTINENCE, UNSPECIFIED TYPE: ICD-10-CM

## 2021-07-29 DIAGNOSIS — I10 ESSENTIAL HYPERTENSION: ICD-10-CM

## 2021-07-29 DIAGNOSIS — N52.9 ED (ERECTILE DYSFUNCTION) OF ORGANIC ORIGIN: ICD-10-CM

## 2021-07-29 DIAGNOSIS — R53.1 WEAKNESS: Primary | ICD-10-CM

## 2021-07-29 DIAGNOSIS — E11.9 TYPE 2 DIABETES MELLITUS WITHOUT COMPLICATION, WITHOUT LONG-TERM CURRENT USE OF INSULIN (HCC): ICD-10-CM

## 2021-07-29 DIAGNOSIS — R29.898 BILATERAL LEG WEAKNESS: ICD-10-CM

## 2021-07-29 LAB
ALBUMIN SERPL BCP-MCNC: 4 G/DL (ref 3.5–5)
ALP SERPL-CCNC: 85 U/L (ref 46–116)
ALT SERPL W P-5'-P-CCNC: 33 U/L (ref 12–78)
ANION GAP SERPL CALCULATED.3IONS-SCNC: 9 MMOL/L (ref 4–13)
AST SERPL W P-5'-P-CCNC: 23 U/L (ref 5–45)
BASOPHILS # BLD AUTO: 0.03 THOUSANDS/ΜL (ref 0–0.1)
BASOPHILS NFR BLD AUTO: 0 % (ref 0–1)
BILIRUB SERPL-MCNC: 0.64 MG/DL (ref 0.2–1)
BUN SERPL-MCNC: 17 MG/DL (ref 5–25)
CALCIUM SERPL-MCNC: 9.1 MG/DL (ref 8.3–10.1)
CHLORIDE SERPL-SCNC: 101 MMOL/L (ref 100–108)
CO2 SERPL-SCNC: 29 MMOL/L (ref 21–32)
CREAT SERPL-MCNC: 1.46 MG/DL (ref 0.6–1.3)
EOSINOPHIL # BLD AUTO: 0.06 THOUSAND/ΜL (ref 0–0.61)
EOSINOPHIL NFR BLD AUTO: 1 % (ref 0–6)
ERYTHROCYTE [DISTWIDTH] IN BLOOD BY AUTOMATED COUNT: 14 % (ref 11.6–15.1)
GFR SERPL CREATININE-BSD FRML MDRD: 60 ML/MIN/1.73SQ M
GLUCOSE SERPL-MCNC: 155 MG/DL (ref 65–140)
HCT VFR BLD AUTO: 43.1 % (ref 36.5–49.3)
HGB BLD-MCNC: 13.7 G/DL (ref 12–17)
IMM GRANULOCYTES # BLD AUTO: 0.04 THOUSAND/UL (ref 0–0.2)
IMM GRANULOCYTES NFR BLD AUTO: 0 % (ref 0–2)
LYMPHOCYTES # BLD AUTO: 1.36 THOUSANDS/ΜL (ref 0.6–4.47)
LYMPHOCYTES NFR BLD AUTO: 14 % (ref 14–44)
MCH RBC QN AUTO: 29.5 PG (ref 26.8–34.3)
MCHC RBC AUTO-ENTMCNC: 31.8 G/DL (ref 31.4–37.4)
MCV RBC AUTO: 93 FL (ref 82–98)
MONOCYTES # BLD AUTO: 0.77 THOUSAND/ΜL (ref 0.17–1.22)
MONOCYTES NFR BLD AUTO: 8 % (ref 4–12)
NEUTROPHILS # BLD AUTO: 7.27 THOUSANDS/ΜL (ref 1.85–7.62)
NEUTS SEG NFR BLD AUTO: 77 % (ref 43–75)
NRBC BLD AUTO-RTO: 0 /100 WBCS
PLATELET # BLD AUTO: 176 THOUSANDS/UL (ref 149–390)
PMV BLD AUTO: 12 FL (ref 8.9–12.7)
POTASSIUM SERPL-SCNC: 4.3 MMOL/L (ref 3.5–5.3)
PROT SERPL-MCNC: 8.5 G/DL (ref 6.4–8.2)
RBC # BLD AUTO: 4.64 MILLION/UL (ref 3.88–5.62)
SODIUM SERPL-SCNC: 139 MMOL/L (ref 136–145)
WBC # BLD AUTO: 9.53 THOUSAND/UL (ref 4.31–10.16)

## 2021-07-29 PROCEDURE — G1004 CDSM NDSC: HCPCS

## 2021-07-29 PROCEDURE — 87476 LYME DIS DNA AMP PROBE: CPT | Performed by: EMERGENCY MEDICINE

## 2021-07-29 PROCEDURE — 83036 HEMOGLOBIN GLYCOSYLATED A1C: CPT | Performed by: FAMILY MEDICINE

## 2021-07-29 PROCEDURE — 82607 VITAMIN B-12: CPT | Performed by: FAMILY MEDICINE

## 2021-07-29 PROCEDURE — 36415 COLL VENOUS BLD VENIPUNCTURE: CPT | Performed by: EMERGENCY MEDICINE

## 2021-07-29 PROCEDURE — 85025 COMPLETE CBC W/AUTO DIFF WBC: CPT | Performed by: EMERGENCY MEDICINE

## 2021-07-29 PROCEDURE — 99284 EMERGENCY DEPT VISIT MOD MDM: CPT | Performed by: EMERGENCY MEDICINE

## 2021-07-29 PROCEDURE — 72158 MRI LUMBAR SPINE W/O & W/DYE: CPT

## 2021-07-29 PROCEDURE — A9585 GADOBUTROL INJECTION: HCPCS | Performed by: EMERGENCY MEDICINE

## 2021-07-29 PROCEDURE — 80053 COMPREHEN METABOLIC PANEL: CPT | Performed by: EMERGENCY MEDICINE

## 2021-07-29 PROCEDURE — 99204 OFFICE O/P NEW MOD 45 MIN: CPT | Performed by: NURSE PRACTITIONER

## 2021-07-29 PROCEDURE — 72157 MRI CHEST SPINE W/O & W/DYE: CPT

## 2021-07-29 PROCEDURE — 62270 DX LMBR SPI PNXR: CPT | Performed by: EMERGENCY MEDICINE

## 2021-07-29 PROCEDURE — 87040 BLOOD CULTURE FOR BACTERIA: CPT | Performed by: EMERGENCY MEDICINE

## 2021-07-29 PROCEDURE — 99285 EMERGENCY DEPT VISIT HI MDM: CPT

## 2021-07-29 RX ORDER — LIDOCAINE HYDROCHLORIDE AND EPINEPHRINE 10; 10 MG/ML; UG/ML
20 INJECTION, SOLUTION INFILTRATION; PERINEURAL ONCE
Status: COMPLETED | OUTPATIENT
Start: 2021-07-29 | End: 2021-07-29

## 2021-07-29 RX ADMIN — GADOBUTROL 14 ML: 604.72 INJECTION INTRAVENOUS at 21:35

## 2021-07-29 RX ADMIN — LIDOCAINE HYDROCHLORIDE,EPINEPHRINE BITARTRATE 20 ML: 10; .01 INJECTION, SOLUTION INFILTRATION; PERINEURAL at 13:36

## 2021-07-29 NOTE — ASSESSMENT & PLAN NOTE
This 61-year-old morbidly obese gentleman with no neurologic history but reports a history of sleep apnea, hypertension, and diabetes, presents to the ER today approximately 11 30 with bilateral lower extremity weakness  HPI below notes a variability or transient in his to the lower extremity weakness  LP was initially attempted without fluid obtained  Subsequent neurologic exam also noted the transient nature of his weakness with a focal region of dysesthesia noted in the low sacral posterior region  MRI of T and L-spine with and without gadolinium the assessment of an epidural abscess or cauda equina injury is pending  Also need to be considering is a spinal cord infarct but with the persistent weakness in his dorsiflexion bilaterally the question of a diabetic neuropathy would be unusual to be bilateral   We will follow this patient with you throughout his hospital stay

## 2021-07-29 NOTE — ED PROVIDER NOTES
History  Chief Complaint   Patient presents with    Leg Pain     PT c/o sudden onset of bilateral leg heavinessand pain (worse in the left leg) EMS reports patient was able to ambulate to stretcher, "a little unsteady but able to walk"     63 y o  male presents with chief complaint of sudden onset of bilateral lower extremity weakness  No other symptoms  No sensory changes  No back pain  No injuries  No bowel or bladder changes  No similar symptoms in the past   No tick bites, no recent exposure to the woods  No diarrhea  No recent URI  He reports he did feel a little warm this morning  He has a pmh significant for htn and niddm  He received his last COVID vaccination greater than 2 months ago  History provided by:  Patient   used: No    Other  Location:  Bilateral lower extremities  Quality:  Weakness   Severity:  Moderate  Onset quality:  Sudden  Duration:  3 hours  Timing:  Constant  Progression:  Unchanged  Chronicity:  New  Context:  Patient woke up with weakness in his lower extremities  Relieved by:  Nothing  Worsened by:  Nothing  Ineffective treatments:  Nothing tried   Associated symptoms: fatigue    Associated symptoms: no chest pain, no diarrhea, no fever, no nausea, no rash, no shortness of breath and no vomiting        Prior to Admission Medications   Prescriptions Last Dose Informant Patient Reported? Taking?    amLODIPine (NORVASC) 10 mg tablet Not Taking at Unknown time  No No   Sig: Take 1 tablet (10 mg total) by mouth daily   Patient not taking: Reported on 7/29/2021   losartan (COZAAR) 25 mg tablet Not Taking at Unknown time  No No   Sig: Take 1 tablet (25 mg total) by mouth daily   Patient not taking: Reported on 7/29/2021   metFORMIN (GLUCOPHAGE) 500 mg tablet Not Taking at Unknown time  No No   Sig: Take 1 tablet (500 mg total) by mouth 2 (two) times a day with meals   Patient not taking: Reported on 7/29/2021   sildenafil (REVATIO) 20 mg tablet Not Taking at Unknown time  No No   Sig: Take 1 tablet (20 mg total) by mouth daily at bedtime as needed (Take 2-4 pills 1 hour prior to use as needed) Take 2-4 tablets as needed   Patient not taking: Reported on 2021   sitaGLIPtin (JANUVIA) 25 mg tablet Not Taking at Unknown time  No No   Sig: Take 1 tablet (25 mg total) by mouth daily   Patient not taking: Reported on 2021      Facility-Administered Medications: None       Past Medical History:   Diagnosis Date    Anxiety     BPH (benign prostatic hyperplasia)     Diabetes mellitus ([facility] [address] )     Difficulty breathing 2011    Hypertension     Impaired fasting blood sugar     Last Assessed: 2013    Obesity     Sleep apnea        Past Surgical History:   Procedure Laterality Date    COLONOSCOPY      Fiberoptic; Last Assessed: 10/24/2012       Family History   Problem Relation Age of Onset    Heart murmur Mother     Cerebral palsy Mother     Heart disease Mother     Sudden death Mother         Early    Breast cancer Sister 48    Pancreatic cancer Maternal Grandmother 61    Emphysema Maternal Grandfather     Hypertension Father     Prostate cancer Father     Colon cancer Neg Hx     Anuerysm Neg Hx     Arrhythmia Neg Hx     Heart failure Neg Hx     Clotting disorder Neg Hx      I have reviewed and agree with the history as documented  E-Cigarette/Vaping    E-Cigarette Use Never User      E-Cigarette/Vaping Substances     Social History     Tobacco Use    Smoking status: Former Smoker     Packs/day: 0 25     Years: 30 00     Pack years: 7 50     Types: Cigarettes     Quit date: 2017     Years since quittin 1    Smokeless tobacco: Never Used   Vaping Use    Vaping Use: Never used   Substance Use Topics    Alcohol use: Yes     Alcohol/week: 6 0 standard drinks     Types: 6 Standard drinks or equivalent per week     Comment: occ    Drug use: No       Review of Systems   Constitutional: Positive for fatigue   Negative for chills, diaphoresis and fever  Respiratory: Negative for shortness of breath  Cardiovascular: Negative for chest pain and palpitations  Gastrointestinal: Negative for diarrhea, nausea and vomiting  Genitourinary: Negative for dysuria and frequency  Musculoskeletal: Positive for arthralgias (posterior leg pain)  Skin: Negative for rash  Neurological: Positive for weakness  All other systems reviewed and are negative  Physical Exam  Physical Exam  Vitals and nursing note reviewed  Constitutional:       General: He is in acute distress (mild)  Appearance: He is well-developed  He is obese  HENT:      Head: Normocephalic and atraumatic  Eyes:      Pupils: Pupils are equal, round, and reactive to light  Neck:      Vascular: No JVD  Cardiovascular:      Rate and Rhythm: Normal rate and regular rhythm  Heart sounds: Normal heart sounds  No murmur heard  No friction rub  No gallop  Pulmonary:      Effort: Pulmonary effort is normal  No respiratory distress  Breath sounds: Normal breath sounds  No wheezing or rales  Chest:      Chest wall: No tenderness  Musculoskeletal:         General: No tenderness  Normal range of motion  Cervical back: Normal range of motion  Skin:     General: Skin is warm and dry  Neurological:      General: No focal deficit present  Mental Status: He is alert and oriented to person, place, and time  Cranial Nerves: No cranial nerve deficit  Sensory: Sensation is intact  Motor: Weakness present  Comments: Unable to overcome resistance in proximal leg muscles, unable to walk more than a few steps secondary to weakness  Psychiatric:         Behavior: Behavior normal          Thought Content:  Thought content normal          Judgment: Judgment normal          Vital Signs  ED Triage Vitals   Temperature Pulse Respirations Blood Pressure SpO2   07/29/21 1152 07/29/21 1152 07/29/21 1152 07/29/21 1152 07/29/21 1152   98 5 °F (36 9 °C) 72 18 (!) 176/83 98 %      Temp Source Heart Rate Source Patient Position - Orthostatic VS BP Location FiO2 (%)   07/29/21 1152 07/29/21 1152 07/29/21 1152 07/29/21 1152 --   Oral Monitor Lying Right arm       Pain Score       07/29/21 1600       No Pain           Vitals:    07/30/21 0529 07/30/21 0720 07/30/21 1056 07/30/21 1208   BP: (!) 174/78 (!) 186/91 (!) 178/93 (!) 171/84   Pulse: 84 75     Patient Position - Orthostatic VS:  Lying Sitting Sitting         Visual Acuity  Visual Acuity      Most Recent Value   L Pupil Size (mm)  2   R Pupil Size (mm)  2   L Pupil Shape  Round   R Pupil Shape  Round          ED Medications  Medications   lidocaine-epinephrine (XYLOCAINE/EPINEPHRINE) 1 %-1:100,000 injection 20 mL (20 mL Infiltration Given 7/29/21 1336)   Gadobutrol injection (SINGLE-DOSE) SOLN 14 mL (14 mL Intravenous Given 7/29/21 2135)       Diagnostic Studies  Results Reviewed     Procedure Component Value Units Date/Time    Blood culture #1 [[id number]] Collected: 07/29/21 1331    Lab Status: Preliminary result Specimen: Blood from Arm, Left Updated: 07/30/21 1501     Blood Culture No Growth at 24 hrs  Blood culture #2 [[id number]] Collected: 07/29/21 1332    Lab Status: Preliminary result Specimen: Blood from Arm, Right Updated: 07/30/21 1501     Blood Culture No Growth at 24 hrs  Vitamin B12 [[id number]]  (Normal) Collected: 07/29/21 1331    Lab Status: Final result Specimen: Blood from Arm, Right Updated: 07/30/21 1038     Vitamin B-12 517 pg/mL     Hemoglobin A1C [[id number]]  (Abnormal) Collected: 07/29/21 1331    Lab Status: Final result Specimen: Blood from Arm, Right Updated: 07/30/21 0933     Hemoglobin A1C 7 0 %       mg/dl     Vitamin B1, whole blood [[id number]] Collected: 07/30/21 0434    Lab Status: In process Specimen: Blood from Arm, Left Updated: 07/30/21 0440    Lyme disease, PCR [[id number]] Collected: 07/29/21 1734    Lab Status:  In process Specimen: Blood from Arm, Right Updated: 07/29/21 1742    Comprehensive metabolic panel [[id number]]  (Abnormal) Collected: 07/29/21 1331    Lab Status: Final result Specimen: Blood from Arm, Right Updated: 07/29/21 1409     Sodium 139 mmol/L      Potassium 4 3 mmol/L      Chloride 101 mmol/L      CO2 29 mmol/L      ANION GAP 9 mmol/L      BUN 17 mg/dL      Creatinine 1 46 mg/dL      Glucose 155 mg/dL      Calcium 9 1 mg/dL      AST 23 U/L      ALT 33 U/L      Alkaline Phosphatase 85 U/L      Total Protein 8 5 g/dL      Albumin 4 0 g/dL      Total Bilirubin 0 64 mg/dL      eGFR 60 ml/min/1 73sq m     Narrative:      National Kidney Disease Foundation guidelines for Chronic Kidney Disease (CKD):     Stage 1 with normal or high GFR (GFR > 90 mL/min/1 73 square meters)    Stage 2 Mild CKD (GFR = 60-89 mL/min/1 73 square meters)    Stage 3A Moderate CKD (GFR = 45-59 mL/min/1 73 square meters)    Stage 3B Moderate CKD (GFR = 30-44 mL/min/1 73 square meters)    Stage 4 Severe CKD (GFR = 15-29 mL/min/1 73 square meters)    Stage 5 End Stage CKD (GFR <15 mL/min/1 73 square meters)  Note: GFR calculation is accurate only with a steady state creatinine    CBC and differential [[id number]]  (Abnormal) Collected: 07/29/21 1331    Lab Status: Final result Specimen: Blood from Arm, Right Updated: 07/29/21 1343     WBC 9 53 Thousand/uL      RBC 4 64 Million/uL      Hemoglobin 13 7 g/dL      Hematocrit 43 1 %      MCV 93 fL      MCH 29 5 pg      MCHC 31 8 g/dL      RDW 14 0 %      MPV 12 0 fL      Platelets 403 Thousands/uL      nRBC 0 /100 WBCs      Neutrophils Relative 77 %      Immat GRANS % 0 %      Lymphocytes Relative 14 %      Monocytes Relative 8 %      Eosinophils Relative 1 %      Basophils Relative 0 %      Neutrophils Absolute 7 27 Thousands/µL      Immature Grans Absolute 0 04 Thousand/uL      Lymphocytes Absolute 1 36 Thousands/µL      Monocytes Absolute 0 77 Thousand/µL      Eosinophils Absolute 0 06 Thousand/µL      Basophils Absolute 0 03 Thousands/µL     Stool Enteric Bacterial Panel by PCR [[id number]]     Lab Status: No result Specimen: Stool                  MRI thoracic spine w wo contrast   Final Result by Angelina Honeycutt MD (07/29 2216)         1  Chronic disc degenerative change in the lower thoracic spine resulting in mild central canal stenosis and neural foraminal narrowing  2   No evidence of thoracic cord or epidural lesion  Workstation performed: [id number]         MRI lumbar spine w wo contrast   Final Result by Angelina Honeycutt MD (07/29 2219)      Mild disc and facet degenerative change in the lower lumbar spine without significant nerve root encroachment  No evidence of disc herniation or epidural lesion  Workstation performed: [id number]                    Procedures  Lumbar puncture    Date/Time: 7/29/2021 3:37 PM  Performed by: Juan Craig MD  Authorized by: Juan Craig MD   Universal Protocol:  Procedure performed by: ([facility])  Consent: Written consent obtained  Risks and benefits: risks, benefits and alternatives were discussed  Consent given by: patient  Time out: Immediately prior to procedure a "time out" was called to verify the correct patient, procedure, equipment, support staff and site/side marked as required    Timeout called at: 7/29/2021 3:37 PM   Patient understanding: patient states understanding of the procedure being performed  Patient consent: the patient's understanding of the procedure matches consent given  Procedure consent: procedure consent matches procedure scheduled  Relevant documents: relevant documents present and verified  Test results: test results available and properly labeled  Patient identity confirmed: verbally with patient      Patient location:  ED  Pre-procedure details:     Preparation: Patient was prepped and draped in usual sterile fashion    Indications:     Indications: diagnostic intervention    Anesthesia (see MAR for exact dosages): Anesthesia method:  Local infiltration    Local anesthetic:  Lidocaine 1% WITH epi  Procedure details:     Lumbar space:  L4-L5 interspace    Patient position:  Patient started sitting but was unable to tolerate this position, we then switched to right lateral decubitus  Equipment: Lumbar puncture kit used      Epidural needle gauge: 22g x 5 in  Needle type:  Spinal needle - Quincke tip    Ultrasound guidance: no      Number of attempts:  2  Post-procedure:     Puncture site:  Adhesive bandage applied    Patient tolerance of procedure:  Procedure terminated at patient's request    Complication comments (i e , not enough fluid obtained, etc ):  Unsuccessful lumbar puncture             ED Course                             SBIRT 22yo+      Most Recent Value   SBIRT (22 yo +)   In order to provide better care to our patients, we are screening all of our patients for alcohol and drug use  Would it be okay to ask you these screening questions? Yes Filed at: 07/29/2021 1558   Initial Alcohol Screen: US AUDIT-C    1  How often do you have a drink containing alcohol? 1 Filed at: 07/29/2021 1558   2  How many drinks containing alcohol do you have on a typical day you are drinking? 1 Filed at: 07/29/2021 1558   3a  Male UNDER 65: How often do you have five or more drinks on one occasion? 0 Filed at: 07/29/2021 1558   Audit-C Score  2 Filed at: 07/29/2021 1558   JOSÉ: How many times in the past year have you    Used an illegal drug or used a prescription medication for non-medical reasons?   Never Filed at: 07/29/2021 1558                    MDM  Number of Diagnoses or Management Options  Weakness: new and requires workup  Diagnosis management comments: Background: 63 y o  male presents with acute bilateral leg weakness    Differential DX includes but is not limited to: guillan barre, MS, less likely spinal epidural abscess or cauda equina    Plan: cbc, cmp, lumbar puncture, neurology consultation Amount and/or Complexity of Data Reviewed  Clinical lab tests: ordered and reviewed  Tests in the radiology section of CPT®: ordered and reviewed  Discuss the patient with other providers: yes    Risk of Complications, Morbidity, and/or Mortality  Presenting problems: high  Diagnostic procedures: high  Management options: high    Patient Progress  Patient progress: stable      Disposition  Final diagnoses:   Weakness - acute bilateral lower extremity     Time reflects when diagnosis was documented in both MDM as applicable and the Disposition within this note     Time User Action Codes Description Comment    7/29/2021  3:56 PM Ane Salts Add [R53 1] Weakness     7/29/2021  3:56 PM Ane Salts Modify [R53 1] Weakness acute bilateral lower extremity    7/30/2021 11:44 AM Qamar Guitar Add [E11 9] Type 2 diabetes mellitus without complication, without long-term current use of insulin ([address] )     7/30/2021 11:44 AM Qamar Guitar Modify [E11 9] Type 2 diabetes mellitus without complication, without long-term current use of insulin ([address] )     7/30/2021 11:44 AM Qamar Guitar Modify [E11 9] Type 2 diabetes mellitus without complication, without long-term current use of insulin ([address] )     7/30/2021 11:44 AM Qamar Guitar Add [I10] Essential hypertension     7/30/2021 11:44 AM Lacretia Marsh, Flory Beto Modify [E11 9] Type 2 diabetes mellitus without complication, without long-term current use of insulin ([address] )     7/30/2021 11:44 AM Qamar Guitar Modify [I10] Essential hypertension     7/30/2021 11:46 AM Lacretia Marsh, Flory Ebto Modify [E11 9] Type 2 diabetes mellitus without complication, without long-term current use of insulin ([address] )     7/30/2021 11:46 AM Qamar Guitar Modify [I10] Essential hypertension     7/30/2021 11:47 AM Lacretia Marsh, Flory Beto Add [R32] Urinary incontinence, unspecified type     7/30/2021  1:39 PM Qamar Guitar Add [N52 9] ED (erectile dysfunction) of organic origin     7/30/2021  2:00 PM Qamar Guitar Add [R29 898] Bilateral leg weakness       ED Disposition     ED Disposition Condition Date/Time Comment    Admit Stable Fri Jul 30, 2021 12:13 AM Case was discussed with MEENAKSHI and the patient's admission status was agreed to be Admission Status: inpatient status to the service of Dr Corby Montalvo   Follow-up Information     Follow up With Specialties Details Why Contact Info    Lindsey Simpson MD Family Medicine Follow up  [address]  [address]      Mckenna Moody MD Urology Follow up  [address]  [phone]            Discharge Medication List as of 7/30/2021  1:38 PM      START taking these medications    Details   Mirabegron ER 25 MG TB24 Take 25 mg by mouth daily at bedtime, Starting Fri 7/30/2021, Until Sun 8/29/2021, Normal         CONTINUE these medications which have CHANGED    Details   amLODIPine (NORVASC) 10 mg tablet Take 1 tablet (10 mg total) by mouth daily, Starting Fri 7/30/2021, Normal      losartan (COZAAR) 25 mg tablet Take 1 tablet (25 mg total) by mouth daily, Starting Fri 7/30/2021, Normal      metFORMIN (GLUCOPHAGE) 500 mg tablet Take 1 tablet (500 mg total) by mouth 2 (two) times a day with meals, Starting Fri 7/30/2021, Normal         CONTINUE these medications which have NOT CHANGED    Details   sildenafil (REVATIO) 20 mg tablet Take 1 tablet (20 mg total) by mouth daily at bedtime as needed (Take 2-4 pills 1 hour prior to use as needed) Take 2-4 tablets as needed, Starting Fri 10/9/2020, Normal      sitaGLIPtin (JANUVIA) 25 mg tablet Take 1 tablet (25 mg total) by mouth daily, Starting Wed 10/28/2020, Normal           Outpatient Discharge Orders   Ambulatory referral to Neurology   Standing Status: Future Standing Exp  Date: 07/30/22      Ambulatory referral to Physical Therapy   Standing Status: Future Standing Exp  Date: 07/30/22      Ambulatory referral to Occupational Therapy   Standing Status: Future Standing Exp   Date: 07/30/22 Activity as tolerated       PDMP Review     None          ED Provider  Electronically Signed by           Collin Dye MD  07/30/21 1227

## 2021-07-29 NOTE — ASSESSMENT & PLAN NOTE
This patient's last A1c was approximately a year and at that time he was [age]  He reports that he has had to change his insurance and thus has not had further subsequent measurements  And uncontrolled or poorly controlled diabetic stay could be responsible for a footdrop however it would be unusual for his presentation such as it was today

## 2021-07-29 NOTE — LETTER
[facility] FLOOR MED SURG UNIT  [address] [address]  Dept: [phone]    July 30, 2021     Patient: Chaz Mueller   YOB: 1960   Date of Visit: 7/29/2021       To Whom it May Concern:    Chaz Mueller is under my professional care  He was seen in the hospital from 7/29/2021   to 07/30/21  He may return to work on 8/1/21 without limitations  If you have any questions or concerns, please don't hesitate to call           Sincerely,          Skylar Chandra MD

## 2021-07-29 NOTE — CONSULTS
[facility]  Neurology Consult- Carol Torres 1960, 63 y o  male   MRN: [id number] Unit/Bed#: ED 26 Encounter: [id number]    Consult to neurology  Consult performed by: JIMMY Lenz  Consult ordered by: Bao Bergeron MD      Reason for Consult / Principal Problem:  Bilateral lower extremity weakness  Hx and PE limited by:  None  Review of previous medical records was completed  Family, specifically the patient's wife present at the bedside for history and examination    Bilateral leg weakness  Assessment & Plan  This 53-year-old morbidly obese gentleman with no neurologic history but reports a history of sleep apnea, hypertension, and diabetes, presents to the ER today approximately 11 30 with bilateral lower extremity weakness  HPI below notes a variability or transient in his to the lower extremity weakness  LP was initially attempted without fluid obtained  Subsequent neurologic exam also noted the transient nature of his weakness with a focal region of dysesthesia noted in the low sacral posterior region  MRI of T and L-spine with and without gadolinium the assessment of an epidural abscess or cauda equina injury is pending  Also need to be considering is a spinal cord infarct but with the persistent weakness in his dorsiflexion bilaterally the question of a diabetic neuropathy would be unusual to be bilateral   We will follow this patient with you throughout his hospital stay  Type 2 diabetes mellitus without complication, without long-term current use of insulin ([facility])  Assessment & Plan  This patient's last A1c was approximately a year and at that time he was 7 5  He reports that he has had to change his insurance and thus has not had further subsequent measurements  And uncontrolled or poorly controlled diabetic stay could be responsible for a footdrop however it would be unusual for his presentation such as it was today      Recommendations for outpatient neurological follow up have yet to be determined  HPI: Larry Zavala is a right handed  63 y o  male who has no history of neurologic disorder  He does have a positive history of hypertension obesity sleep apnea and diabetes  He  presents to the ED today via EMS after an acute episode of bilateral lower extremity weakness at his home  He reports that he was normal when he went to bed last night  He reports that he was able to normally get up and use the bathroom during the night last night and he went back to bed  He reports that when he got up this morning around 10:00 a m  He again headed to the bathroom and he reports now that he does not recall that he had significant difficulty walking to the bathroom  He used the bathroom in a sitting position to move his bowels etcetera and reports that when he went to stand up he felt like he could not  He reports that he literally had to crawl on all fours back to the bedroom  He reports he was really unable to bear weight on his bilateral lower extremities  Here in the ED initially for the team here he had the weakness they were unable to attempt to stand this gentleman  They started to do a spinal tap they were unable to get fluid  At some point the patient felt like he had a returned to almost normal of what he reported was the strength of his bilateral or lower extremities but he reports that the pressure and pain that he felt was still present but lessened  We were asked to seen evaluate this gentleman approximately 4:00 p m   Initially on our evaluation as noted below he was unable to really lift either lower extremity and had difficulty turning from his side back to his back  He reported that the side-lying position was more comfortable  Um with some assistance from us he was able to return to his back and he laid on his back for a few minutes    At some point after he had turned over he reported that the complaints of pain he had felt were relieved again and he was then able to demonstrate as noted below good power in his proximal lower extremities as well as at the knees bilaterally  Went to bed normal last night  When he got up today @ 10 am       ROS: 12 system cued query:  He reports he has had no previous episodes like this  He denies any headaches or visual disturbances no bulbar features no recent illnesses or flu symptoms  He has noted chest pain shortness of breath or palpitation he has no abnormalities reported or affirmed above his hip region  He denies any ongoing neuropathy in his bilateral lower extremities  He reports he has had no injuries or falls lately  He denied that he has had a previous footdrop bilaterally either  Historical Information     Past Medical History:   Diagnosis Date    Anxiety     BPH (benign prostatic hyperplasia)     Diabetes mellitus ([facility] [address]     Difficulty breathing 01/05/2011    Hypertension     Impaired fasting blood sugar     Last Assessed: 1/2/2013    Obesity     Sleep apnea      Past Surgical History:   Procedure Laterality Date    COLONOSCOPY      Fiberoptic; Last Assessed: 10/24/2012       Social History :  He is  lives with his family in their family home  He apparently maybe between jobs right now that his insurance program from 1 to the other apparently may not have kicked in  He 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          Family History:   Family History   Problem Relation Age of Onset    Heart murmur Mother     Cerebral palsy Mother     Heart disease Mother     Sudden death Mother         Early   Lacy Alexy Breast cancer Sister 48    Pancreatic cancer Maternal Grandmother 61    Emphysema Maternal Grandfather     Hypertension Father     Prostate cancer Father     Colon cancer Neg Hx     Anuerysm Neg Hx     Arrhythmia Neg Hx     Heart failure Neg Hx     Clotting disorder Neg Hx          Allergies   Allergen Reactions    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     [place] [id number]: Allergic to nuts, ok with Peanuts and Almonds     Meds:all current active meds have been reviewed    Scheduled Meds:  PRN Meds:       Physical Exam:   Objective   Vitals:Blood pressure 137/69, pulse 72, temperature 98 6 °F (37 °C), temperature source Oral, resp  rate 20, SpO2 99 %  ,There is no height or weight on file to calculate BMI  Patient was examined in emergency department bed his wife is at the bedside  General:  Obese gentleman, appears stated age and cooperative  Head: Normocephalic, without obvious abnormality, atraumatic  Oral exam: lips, mucosa, and tongue moist;   Neck: no carotid bruit,   Lungs: clear to auscultation ant  bilaterally  Heart: regular rate and rhythm, S1, S2 normal, no murmur appreciated,   Abdomen: soft, +BS    Extremities: atraumatic, no cyanosis or edema    Neurologic:   Mental status: Alert, oriented, thought content appropriate  CN Exam: ENOCH, EOM's I, VF full, Gaze conjugate No sensory or motor lateralizations (No PP on face), Hearing I B, CNIX-XII I B  Motor: full power, age appropriate x upper limbs  On initial exam he had a clear weakness of the bilateral lower extremities it was symmetrical with decreased power in the hips bilaterally 3-/5 in the hips  a 2/5 Caldera and plantar flexion bilaterally  He had more of a weakness also with inversion of the right foot than the left  During the time that we were in the room literally within 10 or 15 minutes patient reported he could move again  At that time he was able to demonstrate 5/5 in the hips and the knees bilaterally  He was also able to demonstrate a 4+/5 dorsiflexion and great toe extension  Sensory: intact  X 4 limbs, 4 mod inc lt, temp, vib intact for 15 sec B and prop @ GT intact, PP testing w localized area in region of low sacral area bilaterally at this spine base  At the patient's request I did not do a rectal exam at this time      Cerebellar:  No cerebellar dysfunction, and he was able to demonstrate a heel to shin very well on both his bilateral lower extremities     DTR's: ,  2@ B  1@ Tr and BR B   cross abd @ r patella, L cross patella,  2 R ankle 1 L ankle,   Plantars: downgoing  Gait:  He was not gaited at this time  Lab Results:   I have personally reviewed pertinent reports  , CBC:   Results from last 7 days   Lab Units 07/29/21  1331   WBC Thousand/uL 9 53   RBC Million/uL 4 64   HEMOGLOBIN g/dL 13 7   HEMATOCRIT % 43 1   MCV fL 93   PLATELETS Thousands/uL 176   , BMP/CMP:   Results from last 7 days   Lab Units 07/29/21  1331   SODIUM mmol/L 139   POTASSIUM mmol/L 4 3   CHLORIDE mmol/L 101   CO2 mmol/L 29   BUN mg/dL 17   CREATININE mg/dL 1 46*   CALCIUM mg/dL 9 1   AST U/L 23   ALT U/L 33   ALK PHOS U/L 85   EGFR ml/min/1 73sq m 60   , Vitamin B12:   , HgBA1C:   , TSH:   , Coagulation:   , Lipid Profile:   , Drug Screen:   , Medication Drug Levels:     Imaging Studies: No pertinent films available for review  He has not had imaging yet  Counseling / Coordination of Care  Total Critical Care time spent Greater than 35 minutes excluding procedures, teaching and family updates  Dictation voice to text software has been used in the creation of this document  Please consider this in light of any contextual or grammatical errors

## 2021-07-30 VITALS
TEMPERATURE: 97.9 F | RESPIRATION RATE: 16 BRPM | SYSTOLIC BLOOD PRESSURE: 171 MMHG | HEART RATE: 75 BPM | HEIGHT: 70 IN | BODY MASS INDEX: 43.68 KG/M2 | OXYGEN SATURATION: 95 % | DIASTOLIC BLOOD PRESSURE: 84 MMHG | WEIGHT: 305.12 LBS

## 2021-07-30 PROBLEM — N32.81 OVERACTIVE BLADDER: Status: ACTIVE | Noted: 2021-07-30

## 2021-07-30 LAB
BILIRUB UR QL STRIP: NEGATIVE
CLARITY UR: CLEAR
COLOR UR: YELLOW
EST. AVERAGE GLUCOSE BLD GHB EST-MCNC: 154 MG/DL
GLUCOSE SERPL-MCNC: 163 MG/DL (ref 65–140)
GLUCOSE SERPL-MCNC: 189 MG/DL (ref 65–140)
GLUCOSE UR STRIP-MCNC: NEGATIVE MG/DL
HBA1C MFR BLD: 7 %
HGB UR QL STRIP.AUTO: NEGATIVE
KETONES UR STRIP-MCNC: NEGATIVE MG/DL
LEUKOCYTE ESTERASE UR QL STRIP: NEGATIVE
NITRITE UR QL STRIP: NEGATIVE
PH UR STRIP.AUTO: 6.5 [PH]
PROT UR STRIP-MCNC: NEGATIVE MG/DL
SP GR UR STRIP.AUTO: 1.02 (ref 1–1.03)
UROBILINOGEN UR QL STRIP.AUTO: 0.2 E.U./DL
VIT B12 SERPL-MCNC: 517 PG/ML (ref 100–900)

## 2021-07-30 PROCEDURE — 81003 URINALYSIS AUTO W/O SCOPE: CPT | Performed by: INTERNAL MEDICINE

## 2021-07-30 PROCEDURE — 82948 REAGENT STRIP/BLOOD GLUCOSE: CPT

## 2021-07-30 PROCEDURE — 99213 OFFICE O/P EST LOW 20 MIN: CPT | Performed by: NURSE PRACTITIONER

## 2021-07-30 PROCEDURE — 36415 COLL VENOUS BLD VENIPUNCTURE: CPT | Performed by: FAMILY MEDICINE

## 2021-07-30 PROCEDURE — 97165 OT EVAL LOW COMPLEX 30 MIN: CPT

## 2021-07-30 PROCEDURE — 97162 PT EVAL MOD COMPLEX 30 MIN: CPT

## 2021-07-30 PROCEDURE — 84425 ASSAY OF VITAMIN B-1: CPT | Performed by: FAMILY MEDICINE

## 2021-07-30 PROCEDURE — 94002 VENT MGMT INPAT INIT DAY: CPT

## 2021-07-30 PROCEDURE — 99217 PR OBSERVATION CARE DISCHARGE MANAGEMENT: CPT | Performed by: INTERNAL MEDICINE

## 2021-07-30 RX ORDER — LOSARTAN POTASSIUM 25 MG/1
25 TABLET ORAL DAILY
Status: DISCONTINUED | OUTPATIENT
Start: 2021-07-30 | End: 2021-07-30 | Stop reason: HOSPADM

## 2021-07-30 RX ORDER — LOSARTAN POTASSIUM 25 MG/1
25 TABLET ORAL DAILY
Qty: 90 TABLET | Refills: 0 | Status: SHIPPED | OUTPATIENT
Start: 2021-07-30 | End: 2021-07-30 | Stop reason: SDUPTHER

## 2021-07-30 RX ORDER — SILDENAFIL CITRATE 20 MG/1
20 TABLET ORAL
Status: DISCONTINUED | OUTPATIENT
Start: 2021-07-30 | End: 2021-07-30

## 2021-07-30 RX ORDER — LOSARTAN POTASSIUM 25 MG/1
25 TABLET ORAL DAILY
Qty: 90 TABLET | Refills: 0 | Status: SHIPPED | OUTPATIENT
Start: 2021-07-30 | End: 2021-10-14 | Stop reason: SDUPTHER

## 2021-07-30 RX ORDER — HYDRALAZINE HYDROCHLORIDE 20 MG/ML
5 INJECTION INTRAMUSCULAR; INTRAVENOUS EVERY 6 HOURS PRN
Status: DISCONTINUED | OUTPATIENT
Start: 2021-07-30 | End: 2021-07-30 | Stop reason: HOSPADM

## 2021-07-30 RX ORDER — AMLODIPINE BESYLATE 10 MG/1
10 TABLET ORAL DAILY
Qty: 90 TABLET | Refills: 0 | Status: SHIPPED | OUTPATIENT
Start: 2021-07-30 | End: 2021-07-30 | Stop reason: SDUPTHER

## 2021-07-30 RX ORDER — AMLODIPINE BESYLATE 10 MG/1
10 TABLET ORAL DAILY
Qty: 90 TABLET | Refills: 0 | Status: SHIPPED | OUTPATIENT
Start: 2021-07-30 | End: 2021-11-01 | Stop reason: SDUPTHER

## 2021-07-30 RX ORDER — AMLODIPINE BESYLATE 10 MG/1
10 TABLET ORAL DAILY
Status: DISCONTINUED | OUTPATIENT
Start: 2021-07-30 | End: 2021-07-30 | Stop reason: HOSPADM

## 2021-07-30 RX ADMIN — LOSARTAN POTASSIUM 25 MG: 25 TABLET, FILM COATED ORAL at 08:23

## 2021-07-30 RX ADMIN — HYDRALAZINE HYDROCHLORIDE 5 MG: 20 INJECTION, SOLUTION INTRAMUSCULAR; INTRAVENOUS at 11:00

## 2021-07-30 RX ADMIN — AMLODIPINE BESYLATE 10 MG: 10 TABLET ORAL at 08:23

## 2021-07-30 RX ADMIN — INSULIN LISPRO 1 UNITS: 100 INJECTION, SOLUTION INTRAVENOUS; SUBCUTANEOUS at 09:39

## 2021-07-30 RX ADMIN — HYDRALAZINE HYDROCHLORIDE 5 MG: 20 INJECTION, SOLUTION INTRAMUSCULAR; INTRAVENOUS at 04:31

## 2021-07-30 NOTE — ED CARE HANDOFF
Emergency Department Sign Out Note        Sign out and transfer of care from Dr Nikolai Whitfield at 1700  See Separate Emergency Department note  The patient, Nabil Bishop, was evaluated by the previous provider for bilateral leg weakness  Workup Completed:  Neurology saw patient, recommended MRI T/L      ED Course / Workup Pending (followup):  MRI T/L spine performed, no signs of cauda equina or epidural abscess  Will admit to hospitalist service per previous recommendations by Neurology  Procedures  MDM    Disposition  Final diagnoses:   Weakness - acute bilateral lower extremity     Time reflects when diagnosis was documented in both MDM as applicable and the Disposition within this note     Time User Action Codes Description Comment    7/29/2021  3:56 PM Ivana Thapa Add [R53 1] Weakness     7/29/2021  3:56 PM Ivana Thapa Modify [R53 1] Weakness acute bilateral lower extremity      ED Disposition     ED Disposition Condition Date/Time Comment    Admit Stable Fri Jul 30, 2021 12:13 AM Case was discussed with MEENAKSHI and the patient's admission status was agreed to be Admission Status: inpatient status to the service of Dr Primitivo Johnson   Follow-up Information    None       Patient's Medications   Discharge Prescriptions    No medications on file     No discharge procedures on file         ED Provider  Electronically Signed by     Abel Walls MD  07/30/21 7260

## 2021-07-30 NOTE — DISCHARGE INSTR - AVS FIRST PAGE
Dear Galilea De La Cruz,     It was our pleasure to care for you here at [facility]  It is our hope that we were always able to exceed the expected standards for your care during your stay  You were hospitalized due to bilateral lower extremity weakness  You were cared for on the 4th floor by Julián Cavanaugh MD under the service of Medardo Terry MD with the [facility] who covers for your primary care physician (PCP), Popeye Pedraza MD, while you were hospitalized  If you have any questions or concerns related to this hospitalization, you may contact us at [phone]  For follow up as well as any medication refills, we recommend that you follow up with your primary care physician  A registered nurse will reach out to you by phone within a few days after your discharge to answer any additional questions that you may have after going home  However, at this time we provide for you here, the most important instructions / recommendations at discharge:     · Notable Medication Adjustments -   · Restarted your home medications including metformin, amlodipine and losartan  · Testing Required after Discharge -   · May required EMG testing outpatient  · Important follow up information -   · Follow-up with your PCP and neurologist for further evaluation and management  · Follow-up with urology  · Other Instructions -   · Please return to the emergency department should you experience a recurrence of the symptoms or worsening weakness, numbness or tingling  · Please review this entire after visit summary as additional general instructions including medication list, appointments, activity, diet, any pertinent wound care, and other additional recommendations from your care team that may be provided for you        Sincerely,     Julián Cavanaugh MD

## 2021-07-30 NOTE — ASSESSMENT & PLAN NOTE
He reports that because he is the in between insurances right now he is not as compliant with his medications as he has been the best   Additionally he reports that he has checked his blood pressure at home but it is been sometime  I discussed obtaining a right size cuff for his arm so that he can use the blood pressure machine that he is wife has at home    This would increase his participation in a home blood pressure monitoring program    Will restart patient's amlodipine and losartan  Scripts sent to [facility], discussed with patient on the [address] [place] [address] 4 dollar drug list which is medications are eligible for

## 2021-07-30 NOTE — ASSESSMENT & PLAN NOTE
Follows up with Urology  With complaints of increased urinary frequency  Denies any burning  Afebrile with normal white count  Was prescribed Mirabegron by urologist, however, due to lack of insurance he has been unable to afford this  Recommend outpatient follow-up with Urology

## 2021-07-30 NOTE — PHYSICAL THERAPY NOTE
PHYSICAL THERAPY EVALUATION NOTE          Patient Name: Isabel Whipple  [facility] [place]     AGE:   63 y o   Mrn:   [id number]  ADMIT DX:  Weakness [R53 1]    Past Medical History:   Diagnosis Date    Anxiety     BPH (benign prostatic hyperplasia)     Diabetes mellitus ([facility] [address] )     Difficulty breathing 2011    Hypertension     Impaired fasting blood sugar     Last Assessed: 2013    Obesity     Sleep apnea      Length Of Stay: 1  PHYSICAL THERAPY EVALUATION :   Patient's identity confirmed via 2 patient identifiers (full name and ) at start of session       21 1032   PT Last Visit   PT Visit Date 21   Note Type   Note type Evaluation   Pain Assessment   Pain Assessment Tool 0-10   Pain Score 5   Pain Location/Orientation Orientation: Bilateral;Location: Foot   Pain Onset/Description   (tight; pt reports muscular )   Effect of Pain on Daily Activities limites functional mobility   Home Living   Type of Home Other (Comment)  (3 story [facility]; [facility])   Home Layout Multi-level;Bed/bath upstairs;Stairs to enter without rails  (pt reports 6-7 steps to bed/bath)   Bathroom Shower/Tub Tub/shower unit   Bathroom Toilet Standard   Bathroom Equipment Other (Comment)  (non DME at baseline)   [address] glide   Prior Function   Level of [place] Independent with ADLs and functional mobility   Lives With Spouse;Daughter   Receives Help From Family   ADL Assistance Independent   IADLs Independent   Falls in the last 6 months 0   Vocational Full time employment   Comments pt reports no AD use at baseline; pt drives   Restrictions/Precautions   Weight Bearing Precautions Per Order No   Braces or Orthoses Other (Comment)  (none)   Other Precautions Fall Risk;Pain   General   Family/Caregiver Present No   Cognition   Overall Cognitive Status WFL   Arousal/Participation Cooperative Attention Within functional limits   Orientation Level Oriented X4   Memory Within functional limits   Following Commands Follows all commands and directions without difficulty   Comments pt ID via name and ; pt agreeable to PT eval    RLE Assessment   RLE Assessment WFL   Strength RLE   R Hip Flexion 4/5   R Knee Flexion 4/5   R Knee Extension 4/5   R Ankle Dorsiflexion 4+/5   R Ankle Plantar Flexion 4+/5   LLE Assessment   LLE Assessment WFL   Strength LLE   L Hip Flexion 4/5   L Knee Flexion 4/5   L Knee Extension 4/5   L Ankle Dorsiflexion 4+/5   L Ankle Plantar Flexion 4+/5   Coordination   Movements are Fluid and Coordinated 1  (heel/shin test equal bilaterally)   Sensation X   Light Touch   RLE Light Touch Impaired   RLE Light Touch Comments pt reports chronic decreased sensation   LLE Light Touch Impaired   LLE Light Touch Comments pt reports chronic decreased sensation   Bed Mobility   Supine to Sit Unable to assess   Sit to Supine Unable to assess   Additional Comments pt OOB in recliner chair upon PT arrival to room   Transfers   Sit to Stand 6  Modified independent   Additional items Armrests; Increased time required   Stand to Sit 6  Modified independent   Additional items Armrests; Increased time required   Ambulation/Elevation   Gait pattern Improper Weight shift; Wide EMIL; Decreased foot clearance; Short stride   Gait Assistance 5  Supervision   Assistive Device None   Distance 100'x2  (step trial between)   Stair Management Assistance 5  Supervision   Additional items Verbal cues   Stair Management Technique Step to pattern; Two rails   Number of Stairs 4   Curbs VC for step sequencing and to self-pace on steps; pt gait speed: 0 9144m/sec (0 8-1 2m/sec indicates pt is a community ambulator)   Balance   Static Sitting Good   Dynamic Sitting Good   Static Standing Fair +   Dynamic Standing Fair +   Ambulatory Fair   Endurance Deficit   Endurance Deficit Yes   Endurance Deficit Description pt w/ limited ambulation endurance comapred to baseline   Activity Tolerance   Activity Tolerance Patient limited by fatigue   Medical Staff Made Aware Spoke to OT Sonja; DIANNE Montiel   Nurse Made Aware RN Rosemary Goldberg confirmed pt appropriate for PT eval; post session pt OOB in recliner chair w/ all needs w/in reach w/ pt in no apparent distress; RN updated post and confirmed pt doesn't require chair alarm   Assessment   Prognosis Good   Problem List Decreased endurance; Impaired balance;Decreased mobility;Obesity;Pain   Assessment Nnamdi Key is a 63 y o  Male who [facility] from home w/ c/o leg pain and diagnosis of bilateral LE weakness  Orders for PT eval and treat received, w/ activity orders of up w/ A   Pt presents w/ comorbidities of anxiety, DM, HTN, obesity and personal factors including: living in 3 story house, stair(s) to enter home and anxiety  At baseline, pt mobilizes independently w/ no AD, and reports 0 falls in the last 6 months  Upon evaluation, pt presents w/ the following deficits: altered sensation, impaired balance, gait deviations, and decreased endurance  Pt requires Mod I for transfers, supervision for gait, and supervision for step trial  Pt's clinical presentation is evolving due to pain impacting overall mobility status, gait deviations, impaired balance, and need for input for mobility technique/safety  Pt is at an increased risk of falls due to impaired sensation, gait deviations, impaired balace  Given the above findings, discharge recommendation is for Home w/ OPPT w/ family support/supervision  During this admission, pt would benefit from skilled acute inpatient PT in order to address the abovementioned deficits to maximize function and mobility before DC from acute care      Goals   Patient Goals to go back to work   STG Expiration Date 08/09/21   Short Term Goal #1 Pt will: perform bed mobility independently to decrease caregiver burden; perform transfers independently to increase OOB mobility; ambulate at least 350' independently to increase pt's ambulatory endurance; perform 6 steps w/ railing and Mod I to facilitate return to previous living environment; increase balance ratings by 1/2 grade to decrease pt's risk of falls   PT Treatment Day 0   Plan   Treatment/Interventions Functional transfer training;Elevations; Therapeutic exercise; Endurance training;Patient/family training;Equipment eval/education; Bed mobility;Gait training   PT Frequency 2-3x/wk   Recommendation   PT Discharge Recommendation Home with outpatient rehabilitation  (w/ family support/supervision)   [facility] Basic Mobility Inpatient   Turning in Bed Without Bedrails 4   Lying on Back to Sitting on Edge of Flat Bed 4   Moving Bed to Chair 3   Standing Up From Chair 4   Walk in Room 3   Climb 3-5 Stairs 3   Basic Mobility Inpatient Raw Score 21   Basic Mobility Standardized Score 45 55       The patient's [facility] Basic Mobility Inpatient Short Form Raw Score is 21, Standardized Score is 45 55   A standardized score greater than 42 9 suggests the patient may benefit from discharge to post-acute rehabilitation services      Pt would benefit from skilled inpatient PT during this admission in order to facilitate progress towards goals to maximize functional independence    DC rec: home w/ OPPT w/ family support/supervision      Patrice Lemus, PT, DPT  07/30/21

## 2021-07-30 NOTE — ASSESSMENT & PLAN NOTE
Lab Results   Component Value Date    HGBA1C 7 5 (H) 03/31/2020       No results for input(s): POCGLU in the last 72 hours  Blood Sugar Average: Last 72 hrs:     Poorly controlled, noncompliant with medications   Per patient, he self-discontinued 6 months ago    PLAN  · Will obtain A1C  · Start patient on SSI, algorithm 1  · Hypoglycemia protocol  · Regular BG checks before meals and qHS

## 2021-07-30 NOTE — H&P
[facility]  [facility]P- Unique Jasso 1960, 63 y o  male MRN: [id number]  Unit/Bed#: ED 26 Encounter: [id number]  Primary Care Provider: Camryn Medina MD   Date and time admitted to hospital: 7/29/2021 11:46 AM    * Bilateral leg weakness  Assessment & Plan   Patient woke up at 10AM with bilateral lower extremity weakness that lasted 7 hours through 5-6PM on 7/29 - no history of trauma  Some bladder incontinence   On 7/29/21 - MRI thoracic and lumbar spine only revealed some disc degeneration without significant nerve root encroachment, disc herniation or epidural or thoracic cord lesion  Only mild central canal stenosis    Neurology evaluated at the bedside and is interested in admitting for further evaluation of transient bilateral lower extremity weakness   LP attempted unsuccessfully    Assessment: Patient has full strength now with no focal neurologic deficits on exam  Unclear reason for transient weakness  Bilateral findings make stroke unlikely  GBS would have progressive ascending weakness, and thus resolution of symptoms do not fit this diagnosis either  Especially with bladder incontinence, important to R/O MS (vs other NMJ disorders) in case of transient weakness  Consider other vitamin deficiencies  Neuro has low suspicion for diabetic neuropathy as a cause, however, patient has been poorly controlled and off of medications for a while  PLAN  · Obtain labs - A1C, vitamin B12, B1, stool enteric panel, lyme  · Neurology consulted  · Likely will need LP for full evaluation - recommend discussing with neurology later today to consider IR consult  · Monitor vitals      Type 2 diabetes mellitus without complication, without long-term current use of insulin (HCC)  Assessment & Plan  Lab Results   Component Value Date    HGBA1C 7 5 (H) 03/31/2020       No results for input(s): POCGLU in the last 72 hours      Blood Sugar Average: Last 72 hrs:     Poorly controlled, noncompliant with medications  Per patient, he self-discontinued 6 months ago    PLAN  · Will obtain A1C  · Start patient on SSI, algorithm 1  · Hypoglycemia protocol  · Regular BG checks before meals and qHS        STACY (obstructive sleep apnea)  Assessment & Plan   STACY, on CPAP    PLAN  · Order CPAP at night      Morbid obesity with BMI of 40 0-44 9, adult [facility])  Assessment & Plan   Likely due to poor diet and inactivity    PLAN  · Patient wants to lose weight - discussed referral to weight management for bariatric surgery  Patient is switching insurance, and wants to wait until that is situated      Essential hypertension  Assessment & Plan   BP poorly controlled on admission, elevated at 176/83   Likely due to poor diet, lack of physical activity, and non-compliance with medications    PLAN  · Will slowly restart medications- starting with amlodipine 10mg daily  · Slightly elevated creatinine, but appears to be within baseline - will hold cozaar 25mg for now  Nutrition Assessment and Intervention:     Referral given to nutritionist or nutrition shared medical appointment      Other interventions: Did not give referral - recommended referral prior to discharge and weight management clinic    Tobacco and Toxic Substance Assessment and Intervention:     Tobacco use screening performed    Alcohol and drug use screening performed      Therapeutic Lifestyle Change Visit:     One-on-one comprehensive counseling, coaching, and health behavior change visit completed          VTE Prophylaxis: Enoxaparin (Lovenox)  / sequential compression device   Code Status: level 1  POLST: POLST form is not discussed and not completed at this time  Anticipated Length of Stay:  Patient will be admitted on an Observation basis with an anticipated length of stay of  < 2 midnights     Justification for Hospital Stay: transient weakness    Chief Complaint:   Bilateral lower extremity weakness    History of Present Illness:    Isabel Whipple is a 63 y o  male with HTN, diabetes, STACY on CPAP, who presents with transiet bilateral lower extremity weakness  Patient was in his normal state of health until earlier this morning  Woke up at around 10AM  His legs felt weak and heavy, however he was still able to walk to go to the bathroom  He had a bowel movement in the toilet, and then was unable to stand up again  He explains he didn't lose sensation but his legs were so weak that he couldn't walk  He crawled to the bedroom, and his wife called the ambulance  Denies recent GI or respiratory illness  Denies trauma or similar symptoms in the past  Denies fevers, chills, cough, cold, congestion  Denies other neurologic symptoms  Denies saddle paresthesias  Denies headaches, vision changes or chest pain  Of note, reports he has been non-compliant with his medications for 6 months since changing his insurance  Has been compliant with CPAP however  On admission to the ED, BP slightly elevated at 176/83  On neuro exam, patient was unable to overcome resistance in proximal leg muscles, unable to walk more than a few steps secondary to weakness  CBC and CMP wnl  LP was attempted but was unsuccessful at obtaining fluid  MRI of the thoracic and lower spine were obtained which were relatively unremarkable  Neurology consulted and is following  By 6PM earlier today while patient in the ED, he had full recovery of strength  Patient reports he feels 90% like his normal self, just feels off still since he has urinary incontinence without saddle paresthesias, and feels unsure about walking  Review of Systems:    Review of Systems   Constitutional: Negative for chills, fever and unexpected weight change  HENT: Negative for congestion, rhinorrhea and sore throat  Eyes: Negative for pain and visual disturbance  Respiratory: Negative for cough and shortness of breath  Cardiovascular: Negative for chest pain and leg swelling     Gastrointestinal: Negative for abdominal pain, constipation, diarrhea, nausea and vomiting  Endocrine: Negative for polydipsia and polyuria  Genitourinary: Negative for dysuria and hematuria  Musculoskeletal: Negative for arthralgias and joint swelling  Skin: Negative for rash and wound  Neurological: Positive for weakness  Negative for numbness and headaches  Psychiatric/Behavioral: Negative for dysphoric mood  The patient is not nervous/anxious  Past Medical and Surgical History:     Past Medical History:   Diagnosis Date    Anxiety     BPH (benign prostatic hyperplasia)     Diabetes mellitus ([facility] [address] )     Difficulty breathing 01/05/2011    Hypertension     Impaired fasting blood sugar     Last Assessed: 1/2/2013    Obesity     Sleep apnea        Past Surgical History:   Procedure Laterality Date    COLONOSCOPY      Fiberoptic; Last Assessed: 10/24/2012       Meds/Allergies:    Prior to Admission medications    Medication Sig Start Date End Date Taking? Authorizing Provider   amLODIPine (NORVASC) 10 mg tablet Take 1 tablet (10 mg total) by mouth daily  Patient not taking: Reported on 7/29/2021 10/28/20   Crow Zacarias MD   losartan (COZAAR) 25 mg tablet Take 1 tablet (25 mg total) by mouth daily  Patient not taking: Reported on 7/29/2021 7/28/20   Crow Zacarias MD   metFORMIN (GLUCOPHAGE) 500 mg tablet Take 1 tablet (500 mg total) by mouth 2 (two) times a day with meals  Patient not taking: Reported on 7/29/2021 7/28/20   Crow Zacarias MD   sildenafil (REVATIO) 20 mg tablet Take 1 tablet (20 mg total) by mouth daily at bedtime as needed (Take 2-4 pills 1 hour prior to use as needed) Take 2-4 tablets as needed  Patient not taking: Reported on 7/29/2021 10/9/20   Erika Ortega PA-C   sitaGLIPtin (JANUVIA) 25 mg tablet Take 1 tablet (25 mg total) by mouth daily  Patient not taking: Reported on 7/29/2021 10/28/20   Crow Zacarias MD     I have reviewed home medications with patient personally  Allergies:    Allergies Allergen Reactions    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     [facility] - [id number]: Allergic to nuts, ok with Peanuts and Almonds       Social History:     Marital Status: Single   Occupation: works as a salesman at [employer]  Patient Pre-hospital Living Situation: lives at home with his wife and daughter  Patient Pre-hospital Level of Mobility: ambulates without assistance  Patient Pre-hospital Diet Restrictions: none  Substance Use History:   Social History     Substance and Sexual Activity   Alcohol Use Yes    Alcohol/week: 6 0 standard drinks    Types: 6 Standard drinks or equivalent per week    Comment: occ     Social History     Tobacco Use   Smoking Status Former Smoker    Packs/day: 0 25    Years: 30 00    Pack years: 7 50    Types: Cigarettes    Quit date: 2017    Years since quittin 1   Smokeless Tobacco Never Used     Social History     Substance and Sexual Activity   Drug Use No       Family History:    Family History   Problem Relation Age of Onset    Heart murmur Mother     Cerebral palsy Mother     Heart disease Mother     Sudden death Mother         Early    Breast cancer Sister 48    Pancreatic cancer Maternal Grandmother 61    Emphysema Maternal Grandfather     Hypertension Father     Prostate cancer Father     Colon cancer Neg Hx     Anuerysm Neg Hx     Arrhythmia Neg Hx     Heart failure Neg Hx     Clotting disorder Neg Hx        Physical Exam:     Vitals:   Blood Pressure: (!) 178/83 (21)  Pulse: 78 (21)  Temperature: 98 3 °F (36 8 °C) (21)  Temp Source: Oral (21)  Respirations: 20 (21)  Height: 5' 10" (177 8 cm) (21 1857)  Weight - Scale: (!) 138 kg (305 lb 1 9 oz) (21 0142)  SpO2: 99 % (21)    Physical Exam  Vitals and nursing note reviewed  Constitutional:       General: He is not in acute distress  Appearance: Normal appearance  He is well-developed  He is obese   He is not ill-appearing, toxic-appearing or diaphoretic  HENT:      Head: Normocephalic and atraumatic  Right Ear: External ear normal       Left Ear: External ear normal       Nose: Nose normal    Eyes:      General: No scleral icterus  Right eye: No discharge  Left eye: No discharge  Extraocular Movements: Extraocular movements intact  Conjunctiva/sclera: Conjunctivae normal       Pupils: Pupils are equal, round, and reactive to light  Neck:      Thyroid: No thyromegaly  Cardiovascular:      Rate and Rhythm: Normal rate and regular rhythm  Heart sounds: Normal heart sounds  No murmur heard  No friction rub  No gallop  Pulmonary:      Effort: Pulmonary effort is normal  No respiratory distress  Breath sounds: Normal breath sounds  No stridor  No wheezing or rales  Abdominal:      General: Bowel sounds are normal  There is no distension  Palpations: Abdomen is soft  Tenderness: There is no abdominal tenderness  There is no guarding  Musculoskeletal:         General: No tenderness or deformity  Cervical back: Normal range of motion and neck supple  Right lower leg: No edema  Left lower leg: No edema  Lymphadenopathy:      Cervical: No cervical adenopathy  Skin:     General: Skin is warm and dry  Coloration: Skin is not pale  Neurological:      Mental Status: He is alert and oriented to person, place, and time  Cranial Nerves: No cranial nerve deficit  Sensory: No sensory deficit  Motor: No weakness  Coordination: Coordination normal              Additional Data:     Lab Results: I have personally reviewed pertinent reports        Results from last 7 days   Lab Units 07/29/21  1331   WBC Thousand/uL 9 53   HEMOGLOBIN g/dL 13 7   HEMATOCRIT % 43 1   PLATELETS Thousands/uL 176   NEUTROS PCT % 77*   LYMPHS PCT % 14   MONOS PCT % 8   EOS PCT % 1     Results from last 7 days   Lab Units 07/29/21  1331   POTASSIUM mmol/L 4  3   CHLORIDE mmol/L 101   CO2 mmol/L 29   BUN mg/dL 17   CREATININE mg/dL 1 46*   CALCIUM mg/dL 9 1   ALK PHOS U/L 85   ALT U/L 33   AST U/L 23           Imaging: I have personally reviewed pertinent reports  MRI thoracic spine w wo contrast    Result Date: 7/29/2021  Narrative: MRI THORACIC SPINE WITH AND WITHOUT CONTRAST INDICATION: Sudden onset of bilateral leg heaviness and pain  COMPARISON:  None  TECHNIQUE:  Sagittal T1, sagittal T2, sagittal inversion recovery, axial T2,  axial 2D MERGE  Sagittal and axial T1 postcontrast  IV Contrast:  14 mL of Gadobutrol injection (SINGLE-DOSE) IMAGE QUALITY:  Diagnostic  FINDINGS: ALIGNMENT:  Normal alignment of the thoracic spine  MARROW SIGNAL:  No evidence of osseous lesion or marrow edema  THORACIC CORD:  Normal signal morphology of the thoracic cord and conus  No epidural lesion or collection identified  PREVERTEBRAL AND PARASPINAL SOFT TISSUES:   No mass or fluid collection  THORACIC DEGENERATIVE CHANGE:  Posterior disc bulge and protrusion, was prominent T7-8 and T8-9 resulting in mild central canal stenosis  Mild neural foraminal narrowing in the lower thoracic spine  POSTCONTRAST:  No abnormal enhancement  Impression: 1  Chronic disc degenerative change in the lower thoracic spine resulting in mild central canal stenosis and neural foraminal narrowing  2   No evidence of thoracic cord or epidural lesion  Workstation performed: [id number]     MRI lumbar spine w wo contrast    Result Date: 7/29/2021  Narrative: MRI LUMBAR SPINE WITH AND WITHOUT CONTRAST INDICATION: Bilateral leg pain and heaviness  COMPARISON:  None  TECHNIQUE:  Sagittal T1, sagittal T2, sagittal inversion recovery, axial T1 and axial T2, coronal T2  Sagittal and axial T1 postcontrast  IV Contrast:  14 mL of Gadobutrol injection (SINGLE-DOSE) IMAGE QUALITY:  Diagnostic FINDINGS: VERTEBRAL BODIES:  There are 5 lumbar type vertebral bodies  Normal alignment of the lumbar spine  SACRUM:  Unremarkable  DISTAL CORD AND CONUS:  Normal signal morphology of the distal thoracic cord and conus, terminating at T12-L1  PARASPINAL SOFT TISSUES:  No mass or fluid collection  LOWER THORACIC DISC SPACES:  No significant degenerative change  LUMBAR DISC SPACES: L1-L2:  No disc herniation, central canal stenosis or neural foraminal narrowing  L2-L3:  No disc herniation, central canal stenosis or neural foraminal narrowing  L3-L4:  No disc herniation, central canal stenosis or neural foraminal narrowing  L4-L5:  Minimal posterior disc osteophytes  Facet and ligamentum flavum hypertrophy  No central canal stenosis  Mild bilateral neural foraminal narrowing  L5-S1:  Minimal posterior disc osteophytes and facet osteophytosis  No central canal stenosis  Mild bilateral neural foraminal narrowing  POSTCONTRAST IMAGING:  No abnormal enhancement  Impression: Mild disc and facet degenerative change in the lower lumbar spine without significant nerve root encroachment  No evidence of disc herniation or epidural lesion  Workstation performed: [id number]       EKG, Pathology, and Other Studies Reviewed on Admission:   · EKG: no new EKG    Epic / Care Everywhere Records Reviewed: Yes     ** Please Note: This note has been constructed using a voice recognition system   **

## 2021-07-30 NOTE — UTILIZATION REVIEW
Initial Clinical Review    Admission: Date/Time/Statement: 7/30/2021 0022 Observation   Admission Orders (From admission, onward)     Ordered        07/30/21 0022  Place in Observation  Once                   Orders Placed This Encounter   Procedures    Place in Observation     Standing Status:   Standing     Number of Occurrences:   1     Order Specific Question:   Level of Care     Answer:   Med Surg [16]     ED Arrival Information     Expected Arrival Acuity    - 7/29/2021 11:45 Urgent         Means of arrival Escorted by Service Admission type    [facility] Urgent         Arrival complaint    Weakness        Chief Complaint   Patient presents with    Leg Pain     PT c/o sudden onset of bilateral leg heavinessand pain (worse in the left leg) EMS reports patient was able to ambulate to stretcher, "a little unsteady but able to walk"       Initial Presentation: this is a 63year old male from home  To ED via ems on 7/29/2021 and inpatient order placed on 7/30/2021 due to Bilateral leg weakness  History of DM wit no medication for last 6 months, STACY, Hpt and Morbid obesity  Presented due to bilateral lower extremity weakness of sudden onset, starting at 10 am day of arrival   On exam obese  Weakness  Unable to overcome resistance in proximal leg muscles, unable to walk more than few steps  Hypertensive  Bun 17, creatinine 1 46 with last known creatinine of 1 44 on 3/31/2020  Sherre Soulier MRI thoracic and lumbar spine only revealed some disc degeneration without significant nerve root encroachment, disc herniation or epidural or thoracic cord lesion  Only mild central canal stenosis  In the ED given apresoline  LP attempted without fluid obtained  Weakness lasted until 5 or 6 pm on day of arrival    Plan is consult neurology  Check A1C, vitamin B12, B1, stool enteric panel, lyme, possible LP     Check accu checks qid and start SSI      7/29/2021 per neurology - Patient has bilateral leg weakness with no neurological history  Work up to help with differential which can include spinal cord infarct, epidural abscess or cauda equina injury  Doubt diabetic neuropathy      ED Triage Vitals   Temperature Pulse Respirations Blood Pressure SpO2   07/29/21 1152 07/29/21 1152 07/29/21 1152 07/29/21 1152 07/29/21 1152   98 5 °F (36 9 °C) 72 18 (!) 176/83 98 %      Temp Source Heart Rate Source Patient Position - Orthostatic VS BP Location FiO2 (%)   07/29/21 1152 07/29/21 1152 07/29/21 1152 07/29/21 1152 --   Oral Monitor Lying Right arm       Pain Score       07/29/21 1600       No Pain          Wt Readings from Last 1 Encounters:   07/30/21 (!) 138 kg (305 lb 1 9 oz)     Additional Vital Signs:   07/30/21 0529  --  84  16  174/78Abnormal   112  98 %  --  --  --   07/30/21 0300  --  65  17  207/93Abnormal   --  100 %  --  --  --   07/30/21 0003  98 3 °F (36 8 °C)  78  20  178/83Abnormal   119  99 %  None (Room air)  --  Lying   07/29/21 1857  98 3 °F (36 8 °C)  84  18  125/64  88  97 %  None (Room air)  --  Lying   07/29/21 1600  98 6 °F (37 °C)  72  20  137/69  91  99 %  None (Room air)  --  Lying   07/29/21 1200  --  --  --  176/83Abnormal   119  --  --           Pertinent Labs/Diagnostic Test Results:   7/29/2021 MRI thoracic spine Chronic disc degenerative change in the lower thoracic spine resulting in mild central canal stenosis and neural foraminal narrowing  2   No evidence of thoracic cord or epidural lesion       7/29/2021 MRI lumbar spine - Mild disc and facet degenerative change in the lower lumbar spine without significant nerve root encroachment   No evidence of disc herniation or epidural lesion    Results from last 7 days   Lab Units 07/29/21  1331   WBC Thousand/uL 9 53   HEMOGLOBIN g/dL 13 7   HEMATOCRIT % 43 1   PLATELETS Thousands/uL 176   NEUTROS ABS Thousands/µL 7 27     Results from last 7 days   Lab Units 07/29/21  1331   SODIUM mmol/L 139   POTASSIUM mmol/L 4 3   CHLORIDE mmol/L 101   CO2 mmol/L 29   ANION GAP mmol/L 9   BUN mg/dL 17   CREATININE mg/dL 1 46*   EGFR ml/min/1 73sq m 60   CALCIUM mg/dL 9 1     Results from last 7 days   Lab Units 07/29/21  1331   AST U/L 23   ALT U/L 33   ALK PHOS U/L 85   TOTAL PROTEIN g/dL 8 5*   ALBUMIN g/dL 4 0   TOTAL BILIRUBIN mg/dL 0 64     Results from last 7 days   Lab Units 07/29/21  1331   GLUCOSE RANDOM mg/dL 155*       Results from last 7 days   Lab Units 07/29/21  1332 07/29/21  1331   BLOOD CULTURE  Received in Microbiology Lab  Culture in Progress  Received in Microbiology Lab  Culture in Progress       ED Treatment:   Medication Administration from 07/29/2021 1145 to 07/30/2021 0636       Date/Time Order Dose Route Action Comments     07/29/2021 1336 lidocaine-epinephrine (XYLOCAINE/EPINEPHRINE) 1 %-1:100,000 injection 20 mL 20 mL Infiltration Given      07/30/2021 0431 hydrALAZINE (APRESOLINE) injection 5 mg 5 mg Intravenous Given         Past Medical History:   Diagnosis Date    Anxiety     BPH (benign prostatic hyperplasia)     Diabetes mellitus ([facility] [address] )     Difficulty breathing 01/05/2011    Hypertension     Impaired fasting blood sugar     Last Assessed: 1/2/2013    Obesity     Sleep apnea      Present on Admission:   Type 2 diabetes mellitus without complication, without long-term current use of insulin ([facility])   Essential hypertension   STACY (obstructive sleep apnea)      Admitting Diagnosis: Weakness [R53 1]  Age/Sex: 63 y o  male  Admission Orders:  Scheduled Medications:  amLODIPine, 10 mg, Oral, Daily  enoxaparin, 40 mg, Subcutaneous, BID  insulin lispro, 1-5 Units, Subcutaneous, TID AC  insulin lispro, 1-5 Units, Subcutaneous, HS    Continuous IV Infusions: none      PRN Meds:  hydrALAZINE, 5 mg, Intravenous, Q6H PRN - used x 1 7/30       cpap at bedtime     IP CONSULT TO NEUROLOGY    Network Utilization Review Department  ATTENTION: Please call with any questions or concerns to [phone] and carefully listen to the prompts so that you are directed to the right person  All voicemails are confidential   Richard Hermosillo all requests for admission clinical reviews, approved or denied determinations and any other requests to dedicated fax number below belonging to the campus where the patient is receiving treatment   List of dedicated fax numbers for the Facilities:  [address] DENIALS (Administrative/Medical Necessity) [phone]   [address] (Maternity/NICU/Pediatrics) [phone] [address] Joe Grayel Chris [address]346 [id number] [place] [id number] Alycia Pawel [address] [facility] [place] [address] [phone]

## 2021-07-30 NOTE — OCCUPATIONAL THERAPY NOTE
Occupational Therapy Evaluation     Patient Name: Akbar Lawrence  [id number] Date: 7/30/2021  Problem List  Principal Problem:    Bilateral leg weakness  Active Problems:    Essential hypertension    Morbid obesity with BMI of 40 0-44 9, adult (HCC)    STACY (obstructive sleep apnea)    Type 2 diabetes mellitus without complication, without long-term current use of insulin (HCC)    Past Medical History  Past Medical History:   Diagnosis Date    Anxiety     BPH (benign prostatic hyperplasia)     Diabetes mellitus ([address] )     Difficulty breathing 01/05/2011    Hypertension     Impaired fasting blood sugar     Last Assessed: 1/2/2013    Obesity     Sleep apnea      Past Surgical History  Past Surgical History:   Procedure Laterality Date    COLONOSCOPY      Fiberoptic; Last Assessed: 10/24/2012        07/30/21 0950   OT Last Visit   OT Visit Date 07/30/21  (Friday)   Note Type   Note type Evaluation   Restrictions/Precautions   Weight Bearing Precautions Per Order No   Pain Assessment   Pain Assessment Tool Pain Assessment not indicated - pt denies pain   Pain Score No Pain   Home Living   Type of Home   ([facility])   Home Layout Multi-level;Bed/bath upstairs   Bathroom Shower/Tub Tub/shower unit   Bathroom Toilet Standard   Bathroom Equipment Other (Comment)  (no DME)   Bathroom Accessibility Accessible   Home Equipment Stair glide; Other (Comment)  (pt reports stairglide if needed (for his wife))   Additional Comments Pt reports living in [facility] house w/ wife nd 20 yo daughter  Prior Function   Level of [place] Independent with ADLs and functional mobility   Lives With Spouse;Daughter; Other (Comment)  (pt reports wife had brain cancer (in remission))   ADL Assistance Independent   IADLs Independent  (+ drive and cutting grass)   Falls in the last 6 months 0   Vocational Full time employment   Comments Pt reports I w/ ADL/ IADL PTA w/out use of AD or DME   Lifestyle   Autonomy Pt reports I w/ ADL/ IADL PTA w/ out use of AD or DME  + drive and works at [employer] reports living [facility]/ [facility] and [facility]   Service to Others Pt reports working at [employer]) [facility] [employer] and is on his feet all day   Intrinsic Gratification Pt reports enjoying going on the computer as he works in [address] [place] [address] (WD)   (pleasant, cooperative)   Subjective   Subjective "I am going to get in the shower"   ADL   Where Assessed Chair  (vs standing in room)   [address] [address]  Modified Independent    [address]  Modified independent  (doff socks seated in chair)   LB Dressing Deficit Increased time to complete   [address]  Unable to assess   Additional Comments pt reports unrinary incontinence and wearing brief  Completing ADL at baseline level of I despite symptoms   Bed Mobility   Supine to Sit Unable to assess   Sit to Supine Unable to assess   Additional Comments Pt seated OOB in chair upon arrival and w/ Kristi ZAMUDIO present wrapping IV for shower and standing post eval    Transfers   Sit to Stand 7  Independent   Stand to Sit 7  Independent   Functional Mobility   Functional Mobility 6  Modified independent   Additional Comments Engaged in functional mobiltiy w/ out use of AD or LOB   Pt reports discomfort posterior, proximal LE when sitting and urinary incontinence    Additional items   (no AD )   Balance   Static Sitting Normal   Dynamic Sitting Good   Static Standing Good   Ambulatory Fair +   Activity Tolerance   Activity Tolerance Patient tolerated treatment well   Medical Staff Made Aware spoke to PTLizbet and DIANNE [address] per Kristi ZAMUDIO pt completing ADL tasks w/ out assistance and appropriate to see pt RUE Assessment   RUE Assessment WFL   RUE Strength   RUE Overall Strength Within Functional Limits - strength 5/5   LUE Assessment   LUE Assessment WFL   LUE Strength   LUE Overall Strength Within Functional Limits - strength 5/5   Hand Function   Gross Motor Coordination Functional   Fine Motor Coordination Functional   Sensation   Light Touch No apparent deficits  (B UE to light touch)   Sharp/Dull Not tested   Additional Comments Pt reports discomfort B LE posterior / proximal LE (hamstring) when stting   Cognition   Overall Cognitive Status [facility]   Arousal/Participation Alert; Cooperative   Attention Within functional limits   Orientation Level Oriented X4   Memory Within functional limits   Following Commands Follows all commands and directions without difficulty   Comments Identified pt by full name and birthdate  Able to participte in conversation and communicate wants / needs  Educated pt on active participation in ADL tasksk and pacing  Assessment   Limitation Decreased high-level ADLs   Assessment Pt is a 67yo male [facility] on 7/29/21  Pt presents w/ bilateral leg weakness and significant PMH impacting his occupational performance including HTN, DM, obesity  Pt w/ active OT orders and activity orders  Pt reports living w/ wife and 20 yo daughter in 3 story [facility] PTA  Pt reports I w/ ADL/ IADL w/ out use of AD or DME  Upon eval, pt alert and oriented  Able to participate in conversation and follow directions  Pt completed sit <> stand independently and ADL tasks w/ mod I <> I for set- up  Pt engaged in functional mobility w/ out LOB or use of AD despite symptoms  Recommend active participation in ADL tasks in acute care and discharge home to [facility] w/ no rehab needs when medically stable  Will DC from OT caseload      Goals   Patient Goals Pt stated that he would like to know what is wrong and added that he needs to return to work   Recommendation   OT Discharge Recommendation No rehabilitation needs   AM-PAC Daily Activity Inpatient   Lower Body Dressing 4   Bathing 4   Toileting 4   Upper Body Dressing 4   Grooming 4   Eating 4   Daily Activity Raw Score 24   Daily Activity Standardized Score (Calc for Raw Score >=11) 57 54   AM-[facility] Applied Cognition Inpatient   Following a Speech/Presentation 4   Understanding Ordinary Conversation 4   Taking Medications 4   Remembering Where Things Are Placed or Put Away 4   Remembering List of 4-5 Errands 4   Taking Care of Complicated Tasks 4   Applied Cognition Raw Score 24   Applied Cognition Standardized Score 62 21   Barthel Index   Feeding 10   Bathing 5   Grooming Score 5   Dressing Score 10   Bladder Score 5   Bowels Score 10   Toilet Use Score 10   Transfers (Bed/Chair) Score 15   Mobility (Level Surface) Score 15   Stairs Score 0   Barthel Index Score 85   The patient's raw score on the AM-PAC Daily Activity inpatient short form is 24, standardized score is 57 54, greater than 39 4  Patients at this level are likely to benefit from discharge to home  Please refer to the recommendation of the Occupational Therapist for safe discharge planning       SHELL Acosta/L

## 2021-07-30 NOTE — ASSESSMENT & PLAN NOTE
He reports he is compliant with his obstructive sleep apnea mask  I encouraged him to continue its use

## 2021-07-30 NOTE — PROGRESS NOTES
[facility]  Neurology Progress Note - Lopez Handing 1960, 63 y o  male MRN: [id number]  Unit/Bed#: S -01 Encounter: [id number]    * Bilateral leg weakness  Assessment & Plan  07/30/2021, This 81-year-old morbidly obese gentleman with no neurologic history initially seen yesterday after he presents to the ER  with acute but variable bilateral lower extremity weakness was initially seen today but not examined as he was in the shower, standing, by himself without any degree of weakness  MRI of T and L-spine with and without gadolinium no no acute pathology  The patient is clearly anxious to be discharged she reports to this examiner he would like to go as soon as he can so he can get back to work  Should this problem return he should be seen by his family doctor, referred to physical therapy, and connected with EMG of his bilateral lower extremities  Type 2 diabetes mellitus without complication, without long-term current use of insulin (HCC)  Assessment & Plan  This patient's A1c is 7 and a year ago he was 7 5  I discussed with him today that although his diabetes is generally fairly well controlled and he has no overt neuropathic symptoms in his bilateral lower extremities both his weight and his diabetes as he ages contributes to stroke risk  He will re-engage with his family doctor and his medications particularly  He reports he has had injuries coverage lapses until this coming October  STACY (obstructive sleep apnea)  Assessment & Plan  He reports he is compliant with his obstructive sleep apnea mask  I encouraged him to continue its use  Morbid obesity with BMI of 40 0-44 9, adult [facility])  Assessment & Plan  Discussed with this patient that his obesity is clearly a risk for strokes for degenerative arthritis as well as multiple other complaints    I discussed with him adopting gradually a weight loss program   He had a few questions I believe they were all answered to his satisfaction at this time  Essential hypertension  Assessment & Plan  He reports that because he is the in between insurances right now he is not as compliant with his medications as he has been the best   Additionally he reports that he has checked his blood pressure at home but it is been sometime  I discussed obtaining a right size cuff for his arm so that he can use the blood pressure machine that he is wife has at home  This would increase his participation in a home blood pressure monitoring program      This patient needs to follow-up with his family doctor and the reengaged with both his doctor as well as his medication program   Should this bilateral lower extremity weakness her again he can be scheduled for an EMG but he should start a physical therapy program for his lower back at that time  Subjective/Objective     Subjective:  No, it feels okay today  ROS:  The patient reports he has still a continued sensation of tightness or dysesthesia in his posterior thighs bilaterally  He also reports on exam as noted below a decreased pinprick sensation although lightly on his bilateral anterior thighs compared with the posterior is  He reports he has no weakness today at all he reports that he has able to get up and down in an out of bed without problem  Does report that he feels this is dysesthesia in his posterior thighs is related to his sitting as he feels it more than when he is standing  He denies a generalized muscle weakness in arm and legs consistent with that of a statin he has not been on a statin in sometime  The remainder of his query is negative  This patient denies that he has had a stroke his physical exam of his face is suspicious for that of a small lacunar infarct  He does not have a prior imaging      Medication Dose Route Frequency    amLODIPine  10 mg Oral Daily    enoxaparin  40 mg Subcutaneous BID    hydrALAZINE  5 mg Intravenous Q6H PRN    insulin lispro  1-5 Units Subcutaneous TID AC    insulin lispro  1-5 Units Subcutaneous HS    losartan  25 mg Oral Daily     hydrALAZINE    Vitals: Blood pressure (!) 171/84, pulse 75, temperature 97 9 °F (36 6 °C), temperature source Oral, resp  rate 16, height 5' 10" (1 778 m), weight (!) 138 kg (305 lb 1 9 oz), SpO2 95 %  ,Body mass index is 43 78 kg/m²  Physical Exam:     Mindy Ho seen in:  Initially the patient was seen in the shower  I returned to the room approximately an hour later he was examined in the bedside chair  General appearance: alert,   Neck, Lungs, Heart, & abdomen: WNL  Extremities: atraumatic, no cyanosis or edema    Neurologic:   Mental status: Alert, oriented, thought content appropriate, no aphasia or dysarthria  CN: exam EOM's I, Gaze conjugate  No acutely lateralizing sensory & motor features on exam, (PP not tested on face)  He does however have a softly relaxation of the right nasal labial fold  Reminder CNVIII-XII normal    Motor: full power age appropriate x 4 limbs, 5/5 in all muscle groups including Dorsi and great toe flexion and extension  Sensory: grossly intact  X 4 limbs, PP testing by his report is decreased on the anterior aspect of the thigh bilaterally  He has no pinprick appreciation length depending consistent with that of a diabetic neuropathy in his distal lower extremities  Cerebellar: no ataxia or past pointing w pronation from a modified Romberg position       Gait:  He is independently able to rise from a chair without the use of the arms after he has shifted his weight forward  He has no loss of balance on that maneuver  His gait is wide-based but smooth,   Plantars:  [place] today bilaterally      Lab Results:   I have personally reviewed pertinent reports    , CBC:   Results from last 7 days   Lab Units 07/29/21  1331   WBC Thousand/uL 9 53   RBC Million/uL 4 64   HEMOGLOBIN g/dL 13 7   HEMATOCRIT % 43 1   MCV fL 93   PLATELETS Thousands/uL 176   , BMP/CMP: Results from last 7 days   Lab Units 07/29/21  1331   SODIUM mmol/L 139   POTASSIUM mmol/L 4 3   CHLORIDE mmol/L 101   CO2 mmol/L 29   BUN mg/dL 17   CREATININE mg/dL 1 46*   CALCIUM mg/dL 9 1   AST U/L 23   ALT U/L 33   ALK PHOS U/L 85   EGFR ml/min/1 73sq m 60   , Vitamin B12:   Results from last 7 days   Lab Units 07/29/21  1331   VITAMIN B 12 pg/mL 517   , HgBA1C:   Results from last 7 days   Lab Units 07/29/21  1331   HEMOGLOBIN A1C % 7 0*   , TSH:   , Coagulation:   , Lipid Profile:        Imaging Studies: I have personally reviewed pertinent films in PACS and His MRI with and without gadolinium is negative for acute cord pathology  He was noted to have Chronic disc degenerative change in the lower thoracic spine resulting in mild central canal stenosis and neural foraminal narrowing particularly at the T7-8 and T8-9 region  Counseling / Coordination of Care  Total time spent today Greater than 20 minutes  Greater than 50% of total time was spent with the patient and / or family counseling and / or coordination of care  A description of the counseling / coordination of care: All of the above was discussed in detail with the patient  He reports that he stands a lot at work he does not lift a lot of heavy things  I discussed with him weight loss and diabetes and flexibility of his lower back  We discussed his meds and a blood pressure monitoring program at home  He had several questions I believe they were all addressed to his satisfaction  He is anxious to return to work additionally he reports he is in between his insurance carriers right now

## 2021-07-30 NOTE — ED NOTES
Patient informed of transferring from stretcher to hospital bed to chair and asked if he would be okay to stand and pivot to chair in his current condition  Patient agreeable, and stated, "I can walk  I turned the light off when the doctor left  I just feel weak " RN verbalized understanding       Jaz Matthews RN  07/30/21 8613

## 2021-07-30 NOTE — ASSESSMENT & PLAN NOTE
 Patient woke up at 10AM with bilateral lower extremity weakness that lasted 7 hours through 5-6PM on 7/29 - no history of trauma  Some bladder incontinence   On 7/29/21 - MRI thoracic and lumbar spine only revealed some disc degeneration without significant nerve root encroachment, disc herniation or epidural or thoracic cord lesion  Only mild central canal stenosis    Neurology evaluated at the bedside and is interested in admitting for further evaluation of transient bilateral lower extremity weakness   LP attempted unsuccessfully    Assessment: Patient has full strength now with no focal neurologic deficits on exam  Unclear reason for transient weakness  Bilateral findings make stroke unlikely  GBS would have progressive ascending weakness, and thus resolution of symptoms do not fit this diagnosis either  Especially with bladder incontinence, important to R/O MS (vs other NMJ disorders) in case of transient weakness  Consider other vitamin deficiencies  Neuro has low suspicion for diabetic neuropathy as a cause, however, patient has been poorly controlled and off of medications for a while       PLAN  · Obtain labs - A1C, vitamin B12, B1, stool enteric panel, lyme  · Neurology consulted  · Likely will need LP for full evaluation - recommend discussing with neurology later today to consider IR consult  · Monitor vitals

## 2021-07-30 NOTE — DISCHARGE SUMMARY
[facility]  Discharge- Dileep Payne 1960, 63 y o  male MRN: [id number]  Unit/Bed#: S -01 Encounter: [id number]  Primary Care Provider: Elizabeth Puckett MD   Date and time admitted to hospital: 7/29/2021 11:46 AM    * Bilateral leg weakness  Assessment & Plan   Patient woke up at 10AM with bilateral lower extremity weakness that lasted 7 hours through 5-6PM on 7/29 - no history of trauma  Some bladder incontinence   On 7/29/21 - MRI thoracic and lumbar spine only revealed some disc degeneration without significant nerve root encroachment, disc herniation or epidural or thoracic cord lesion  Only mild central canal stenosis    LP attempted unsuccessfully   Neurology assess patient, symptoms apparently resolved   Appreciate PT OT eval, home with outpatient rehab   Vitamin B12 normal      Assessment: Patient has full strength now with no focal neurologic deficits on exam  Unclear reason for transient weakness  PLAN  · Follow-up on:  vitamin B1 and lyme  · Home rehab  · Appreciate neurology recommendations  Outpatient referrals to Neurology/PCP, patient may require EMG of his bilateral lower extremities  Overactive bladder  Assessment & Plan  Follows up with Urology  With complaints of increased urinary frequency  Denies any burning  Afebrile with normal white count  Was prescribed Mirabegron by urologist, however, due to lack of insurance he has been unable to afford this  Recommend outpatient follow-up with Urology  Type 2 diabetes mellitus without complication, without long-term current use of insulin [facility])  Assessment & Plan  Lab Results   Component Value Date    HGBA1C 7 0 (H) 07/29/2021       Recent Labs     07/30/21  0724 07/30/21  1114   POCGLU 189* 163*       Blood Sugar Average: Last 72 hrs:  (P) 176   Reports to be metformin 500 daily and sitagliptin 25 mg daily, however, patient has not been on any glycemic medication due to lack of insurance    Will restart patient's metformin, sent to [employer]  Discussed with patient the 4 dollar [facility] list  Close follow-up with PCP  Diabetic diet, lifestyle modifications  STACY (obstructive sleep apnea)  Assessment & Plan  He reports he is compliant with his obstructive sleep apnea mask  I encouraged him to continue its use  Morbid obesity with BMI of 40 0-44 9, adult [facility])  Assessment & Plan  Discussed with the patient on dietary modification and lifestyle changes  Essential hypertension  Assessment & Plan  He reports that because he is the in between insurances right now he is not as compliant with his medications as he has been the best   Additionally he reports that he has checked his blood pressure at home but it is been sometime  I discussed obtaining a right size cuff for his arm so that he can use the blood pressure machine that he is wife has at home  This would increase his participation in a home blood pressure monitoring program    Will restart patient's amlodipine and losartan  Scripts sent to [facility], discussed with patient on the [employer] 4 dollar drug list which is medications are eligible for      Medical Problems     Resolved Problems  Date Reviewed: 4/28/2020    None              Discharging Resident: Hernando Vides MD  Discharging Attending: No att  providers found  PCP: Mary Gibbs MD  Admission Date:   Admission Orders (From admission, onward)     Ordered        07/30/21 0022  Place in Observation  Once         07/30/21 0014  Inpatient Admission  Once,   Status:  Canceled                   Discharge Date: 07/30/21    Consultations During Hospital Stay:  · Neurology    Procedures Performed:   · Unsuccessful LP attempt  Significant Findings / Test Results:     MRI thoracic spine w wo contrast 7/29/2021  1  Chronic disc degenerative change in the lower thoracic spine resulting in mild central canal stenosis and neural foraminal narrowing    2   No evidence of thoracic cord or epidural lesion  MRI lumbar spine w wo contrast 7/29/2021: Mild disc and facet degenerative change in the lower lumbar spine without significant nerve root encroachment  No evidence of disc herniation or epidural lesion  Incidental Findings:   · None  Test Results Pending at Discharge (will require follow up): · Vitamin B1 and lyme studies     Outpatient Tests Requested:  · May possibly require EMG outpatient if symptoms persist      Complications:  None    Reason for Admission:  Lower extremity weakness  Hospital Course:   Michael Wyatt is a 63 y o  male patient with a history of hypertension, diabetes, obstructive sleep apnea on CPAP who originally presented to the hospital on 7/29/2021 due to bilateral lower extremity weakness  On initial evaluation by the Neurology team, patient was barely able to move his bilateral lower extremity to feel strength except for bilateral dorsiflexion  There is no sensory level  MRI T and L-spine without cord pathology  With no significant electrolyte abnormalities  Later on in the day, patient was noted to have improved no extremity weakness with minimal residual deficits  PT OT recommending home with PT  Patient noted to be ambulating by himself without any degree of weakness  Neurology recommending close follow-up with primary care provider and an EMG should the patient experience recurrence of the bilateral lower extremity weakness  During this hospitalization, patient's blood pressures were noted to be elevated  Unfortunately, patient has been noncompliant with his medication due to lack of insurance  Patient's home medications were restarted on discharge and scripts sent to [employer]  Home medications eligible for [employer] 4 dollar listing  Patient currently stable for discharge  Please see above list of diagnoses and related plan for additional information       Condition at Discharge: stable    Discharge Day Visit / Exam:   Subjective:  Patient seen examined this morning  He was seen to be ambulating from the bathroom to the bed without any need for assistive devices  He did have a very careful gait  He reports that his lower extremity weakness is about 90 percent improved  Denies any sensory deficits  He denies any headaches, palpitation, chest pain or blurry vision related to his blood pressure  Vitals: Blood Pressure: (!) 171/84 (07/30/21 1208)  Pulse: 75 (07/30/21 0720)  Temperature: 97 9 °F (36 6 °C) (07/30/21 0720)  Temp Source: Oral (07/30/21 0720)  Respirations: 16 (07/30/21 0720)  Height: 5' 10" (177 8 cm) (07/29/21 1857)  Weight - Scale: (!) 138 kg (305 lb 1 9 oz) (07/30/21 0142)  SpO2: 95 % (07/30/21 0720)  Exam:   Physical Exam  Vitals and nursing note reviewed  Constitutional:       General: He is not in acute distress  Appearance: He is well-developed  He is not diaphoretic  HENT:      Head: Normocephalic and atraumatic  Cardiovascular:      Rate and Rhythm: Normal rate and regular rhythm  Heart sounds: Normal heart sounds  No murmur heard  Pulmonary:      Effort: Pulmonary effort is normal       Breath sounds: Normal breath sounds  No wheezing or rales  Abdominal:      General: Bowel sounds are normal  There is no distension  Palpations: Abdomen is soft  Tenderness: There is no abdominal tenderness  Skin:     General: Skin is warm and dry  Neurological:      General: No focal deficit present  Mental Status: He is alert and oriented to person, place, and time  Sensory: No sensory deficit  Motor: No weakness  Comments: Intact sensation  Improved lower extremity weakness 5/5 on my exam   Patient observed ambulating without any assistive devices although with a cautious gait         Discussion with Family: Updated  (wife and daughter) at bedside      Discharge instructions/Information to patient and family:   See after visit summary for information provided to patient and family  Provisions for Follow-Up Care:  See after visit summary for information related to follow-up care and any pertinent home health orders  Disposition:   Home    Planned Readmission:  None  Discharge Medications:  See after visit summary for reconciled discharge medications provided to patient and/or family        **Please Note: This note may have been constructed using a voice recognition system**

## 2021-07-30 NOTE — ASSESSMENT & PLAN NOTE
He reports that because he is the in between insurances right now he is not as compliant with his medications as he has been the best   Additionally he reports that he has checked his blood pressure at home but it is been sometime  I discussed obtaining a right size cuff for his arm so that he can use the blood pressure machine that he is wife has at home    This would increase his participation in a home blood pressure monitoring program

## 2021-07-30 NOTE — ASSESSMENT & PLAN NOTE
Discussed with this patient that his obesity is clearly a risk for strokes for degenerative arthritis as well as multiple other complaints  I discussed with him adopting gradually a weight loss program   He had a few questions I believe they were all answered to his satisfaction at this time

## 2021-07-30 NOTE — ASSESSMENT & PLAN NOTE
 Likely due to poor diet and inactivity    PLAN  · Patient wants to lose weight - discussed referral to weight management for bariatric surgery   Patient is switching insurance, and wants to wait until that is situated

## 2021-07-30 NOTE — ASSESSMENT & PLAN NOTE
07/30/2021, This 57-year-old morbidly obese gentleman with no neurologic history initially seen yesterday after he presents to the ER  with acute but variable bilateral lower extremity weakness was initially seen today but not examined as he was in the shower, standing, by himself without any degree of weakness  MRI of T and L-spine with and without gadolinium no no acute pathology  The patient is clearly anxious to be discharged she reports to this examiner he would like to go as soon as he can so he can get back to work  Should this problem return he should be seen by his family doctor, referred to physical therapy, and connected with EMG of his bilateral lower extremities

## 2021-07-30 NOTE — ASSESSMENT & PLAN NOTE
 BP poorly controlled on admission, elevated at 176/83   Likely due to poor diet, lack of physical activity, and non-compliance with medications    PLAN  · Will slowly restart medications- starting with amlodipine 10mg daily  · Slightly elevated creatinine, but appears to be within baseline - will hold cozaar 25mg for now

## 2021-07-30 NOTE — PLAN OF CARE
Problem: PHYSICAL THERAPY ADULT  Goal: Performs mobility at highest level of function for planned discharge setting  See evaluation for individualized goals  Description: Treatment/Interventions: Functional transfer training, Elevations, Therapeutic exercise, Endurance training, Patient/family training, Equipment eval/education, Bed mobility, Gait training          See flowsheet documentation for full assessment, interventions and recommendations  Note: Prognosis: Good  Problem List: Decreased endurance, Impaired balance, Decreased mobility, Obesity, Pain  Assessment: Michael Wyatt is a 63 y o  Male who [facility] on 7/29/2021 from home w/ c/o leg pain and diagnosis of bilateral LE weakness  Orders for PT eval and treat received, w/ activity orders of up w/ A   Pt presents w/ comorbidities of anxiety, DM, HTN, obesity and personal factors including: living in 3 story house, stair(s) to enter home and anxiety  At baseline, pt mobilizes independently w/ no AD, and reports 0 falls in the last 6 months  Upon evaluation, pt presents w/ the following deficits: altered sensation, impaired balance, gait deviations, and decreased endurance  Pt requires Mod I for transfers, supervision for gait, and supervision for step trial  Pt's clinical presentation is evolving due to pain impacting overall mobility status, gait deviations, impaired balance, and need for input for mobility technique/safety  Pt is at an increased risk of falls due to impaired sensation, gait deviations, impaired balace  Given the above findings, discharge recommendation is for Home w/ OPPT w/ family support/supervision  During this admission, pt would benefit from skilled acute inpatient PT in order to address the abovementioned deficits to maximize function and mobility before DC from acute care              PT Discharge Recommendation: Home with outpatient rehabilitation (w/ family support/supervision)          See flowsheet documentation for full assessment

## 2021-07-30 NOTE — ASSESSMENT & PLAN NOTE
This patient's A1c is 7 and a year ago he was 7 5  I discussed with him today that although his diabetes is generally fairly well controlled and he has no overt neuropathic symptoms in his bilateral lower extremities both his weight and his diabetes as he ages contributes to stroke risk  He will re-engage with his family doctor and his medications particularly  He reports he has had injuries coverage lapses until this coming October

## 2021-07-30 NOTE — ASSESSMENT & PLAN NOTE
 Patient woke up at 10AM with bilateral lower extremity weakness that lasted 7 hours through 5-6PM on 7/29 - no history of trauma  Some bladder incontinence   On 7/29/21 - MRI thoracic and lumbar spine only revealed some disc degeneration without significant nerve root encroachment, disc herniation or epidural or thoracic cord lesion  Only mild central canal stenosis    LP attempted unsuccessfully   Neurology assess patient, symptoms apparently resolved   Appreciate PT OT eval, home with outpatient rehab   Vitamin B12 normal      Assessment: Patient has full strength now with no focal neurologic deficits on exam  Unclear reason for transient weakness  PLAN  · Follow-up on:  vitamin B1 and lyme  · Home rehab  · Appreciate neurology recommendations  Outpatient referrals to Neurology/PCP, patient may require EMG of his bilateral lower extremities

## 2021-07-30 NOTE — ASSESSMENT & PLAN NOTE
Lab Results   Component Value Date    HGBA1C 7 0 (H) 07/29/2021       Recent Labs     07/30/21  0724 07/30/21  1114   POCGLU 189* 163*       Blood Sugar Average: Last 72 hrs:  (P) 176   Reports to be metformin 500 daily and sitagliptin 25 mg daily, however, patient has not been on any glycemic medication due to lack of insurance  Will restart patient's metformin, sent to [facility]  Discussed with patient the 4 dollar [facility] list  Close follow-up with PCP  Diabetic diet, lifestyle modifications

## 2021-08-02 ENCOUNTER — TRANSITIONAL CARE MANAGEMENT (OUTPATIENT)
Dept: FAMILY MEDICINE CLINIC | Facility: CLINIC | Age: 61
End: 2021-08-02

## 2021-08-02 ENCOUNTER — TELEPHONE (OUTPATIENT)
Dept: OTHER | Facility: OTHER | Age: 61
End: 2021-08-02

## 2021-08-02 ENCOUNTER — TELEPHONE (OUTPATIENT)
Dept: UROLOGY | Facility: CLINIC | Age: 61
End: 2021-08-02

## 2021-08-03 LAB
BACTERIA BLD CULT: NORMAL
BACTERIA BLD CULT: NORMAL

## 2021-08-04 LAB
B BURGDOR DNA SPEC QL NAA+PROBE: NEGATIVE
VIT B1 BLD-SCNC: 133.1 NMOL/L (ref 66.5–200)

## 2021-08-04 NOTE — PROGRESS NOTES
8/5/2021      Chief Complaint   Patient presents with    Urinary Frequency     Assessment and Plan    1  LUTS and weak stream  2  Incomplete bladder emptying  - FTN=285  - He has not had bowel movement in several days  Recommend Miralax and stool softener    - Recommend limiting bladder irritants and avoidance of constipation    - Will start trial of Flomax  - Urine dip + for leukocytes, negative nitrites and blood  Given worsening LUTS and intermittent dysuria, will send out urine for culture  - Consider cystoscopy and TRUS evaluation    3  Prostate cancer screening  - PSA from 2017 was 0 3  - MACKENZIE unremarkable  - Obtain updated PSA  - He should f/u with PCP in regards to lower extremity pain, what sounds like lumbar radiculopathy      - F/u in 3 months for symptom reassessment PVR and Uroflow    History of Present Illness  Jovanny Rosario is a 63 y o  male here for follow up evaluation of  lower urinary tract symptoms  More recently patient does report worsening incontinence and dysuria  He complains of urgency, frequency, straining with urination, weak urinary stream, urinary leakage, and intermittent dysuria  He denies any hematuria, flank pain, or abdominal pain  He does admit to constipation and has not had a bowel movement in several days  He denies any prior  surgical manipulation  Denies any family history of  malignancy  Last PSA obtained was in 2017 which was 0 3  Medical comorbidities include obesity, type 2 diabetes, obstructive sleep apnea  Reports he is compliant with CPAP  Former smoker  Patient also complains of bilateral lower extremity pain to lateral aspect of both thighs, also will radiate into groin region  He states he has been unable to work due to this  He denies any loss of bladder or bowel control  Review of Systems   Constitutional: Negative for chills and fever  Respiratory: Negative for shortness of breath  Cardiovascular: Negative for chest pain  Gastrointestinal: Positive for constipation  Negative for abdominal pain, diarrhea, nausea and vomiting  Genitourinary: Positive for difficulty urinating, dysuria, frequency and urgency  Negative for flank pain, hematuria and testicular pain  Neurological: Negative for dizziness                    Past Medical History  Past Medical History:   Diagnosis Date    Anxiety     BPH (benign prostatic hyperplasia)     Diabetes mellitus ([facility] [address] )     Difficulty breathing 2011    Hypertension     Impaired fasting blood sugar     Last Assessed: 2013    Obesity     Sleep apnea        Past Social History  Past Surgical History:   Procedure Laterality Date    COLONOSCOPY      Fiberoptic; Last Assessed: 10/24/2012     Social History     Tobacco Use   Smoking Status Former Smoker    Packs/day: 0 25    Years: 30 00    Pack years: 7 50    Types: Cigarettes    Quit date: 2017    Years since quittin 1   Smokeless Tobacco Never Used       Past Family History  Family History   Problem Relation Age of Onset    Heart murmur Mother     Cerebral palsy Mother     Heart disease Mother    [facility] Sudden death Mother         Early    Breast cancer Sister 48    Pancreatic cancer Maternal Grandmother 61    Emphysema Maternal Grandfather     Hypertension Father     Prostate cancer Father     Colon cancer Neg Hx     Anuerysm Neg Hx     Arrhythmia Neg Hx     Heart failure Neg Hx     Clotting disorder Neg Hx        Past Social history  Social History     Socioeconomic History    Marital status: Single     Spouse name: Not on file    Number of children: Not on file    Years of education: Not on file    Highest education level: Not on file   Occupational History    Occupation: [employer]   Tobacco Use    Smoking status: Former Smoker     Packs/day: 0 25     Years: 30 00     Pack years: 7 50     Types: Cigarettes     Quit date: 2017     Years since quittin 1    Smokeless tobacco: Never Used   Vaping Use    Vaping Use: Never used   Substance and Sexual Activity    Alcohol use: Yes     Alcohol/week: 6 0 standard drinks     Types: 6 Standard drinks or equivalent per week     Comment: occ    Drug use: No    Sexual activity: Yes     Partners: Female     Comment: no new partners in the past 3months    Other Topics Concern    Not on file   Social History Narrative    Daily caffeine consumption    Dental care, regularly    Inadequate exercise    Poorly balanced diet     Social Determinants of Health     Financial Resource Strain:     Difficulty of Paying Living Expenses:    Food Insecurity:     Worried About Running Out of Food in the Last Year:     Ran Out of Food in the Last Year:    Transportation Needs:     Lack of Transportation (Medical):      Lack of Transportation (Non-Medical):    Physical Activity:     Days of Exercise per Week:     Minutes of Exercise per Session:    Stress:     Feeling of Stress :    Social Connections:     Frequency of Communication with Friends and Family:     Frequency of Social Gatherings with Friends and Family:     Attends [employer] Services:     Active Member of Clubs or Organizations:     Attends Club or Organization Meetings:     Marital Status:    Intimate Partner Violence:     Fear of Current or Ex-Partner:     Emotionally Abused:     Physically Abused:     Sexually Abused:        Current Medications  Current Outpatient Medications   Medication Sig Dispense Refill    amLODIPine (NORVASC) 10 mg tablet Take 1 tablet (10 mg total) by mouth daily 90 tablet 0    losartan (COZAAR) 25 mg tablet Take 1 tablet (25 mg total) by mouth daily 90 tablet 0    metFORMIN (GLUCOPHAGE) 500 mg tablet Take 1 tablet (500 mg total) by mouth 2 (two) times a day with meals 180 tablet 0    Mirabegron ER 25 MG TB24 Take 25 mg by mouth daily at bedtime 30 tablet 0    sitaGLIPtin (JANUVIA) 25 mg tablet Take 1 tablet (25 mg total) by mouth daily (Patient not taking: Reported on 7/29/2021) 30 tablet 5    tamsulosin (FLOMAX) 0 4 mg Take 1 capsule (0 4 mg total) by mouth daily with dinner 30 capsule 2     No current facility-administered medications for this visit  Allergies  Allergies   Allergen Reactions    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     Annotation - [id number]: Allergic to nuts, ok with Peanuts and Almonds         The following portions of the patient's history were reviewed and updated as appropriate: allergies, current medications, past medical history, past social history, past surgical history and problem list       Vitals  Vitals:    08/05/21 0946   BP: 132/74   BP Location: Left arm   Patient Position: Sitting   Cuff Size: Adult   Pulse: 84   Weight: (!) 137 kg (302 lb)   Height: 5' 10" (1 778 m)           Physical Exam  Physical Exam  Constitutional:       Appearance: Normal appearance  He is obese  HENT:      Head: Normocephalic and atraumatic  Right Ear: External ear normal       Left Ear: External ear normal    Eyes:      General: No scleral icterus  Conjunctiva/sclera: Conjunctivae normal    Cardiovascular:      Pulses: Normal pulses  Pulmonary:      Effort: Pulmonary effort is normal    Genitourinary:     Comments: Prostate approximately 30 g without nodules or tenderness  Musculoskeletal:         General: Normal range of motion  Cervical back: Normal range of motion  Skin:     General: Skin is warm and dry  Neurological:      General: No focal deficit present  Mental Status: He is alert and oriented to person, place, and time  Psychiatric:         Mood and Affect: Mood normal          Behavior: Behavior normal          Thought Content:  Thought content normal          Judgment: Judgment normal            Results  Recent Results (from the past 1 hour(s))   POCT urine dip    Collection Time: 08/05/21  9:48 AM   Result Value Ref Range    LEUKOCYTE ESTERASE,UA +     NITRITE,UA -     SL AMB POCT UROBILINOGEN -     POCT URINE PROTEIN - PH,UA 6 5     [address] [facility] 1 020     [address], UA -      COLOR,UA dark yellow     CLARITY,UA clear    POCT Measure PVR    Collection Time: 08/05/21  9:49 AM   Result Value Ref Range    POST-VOID RESIDUAL VOLUME, ML  mL   ]  Lab Results   Component Value Date    PSA 0 3 05/25/2017     Lab Results   Component Value Date    CALCIUM 9 1 07/29/2021    K 4 3 07/29/2021    CO2 29 07/29/2021     07/29/2021    BUN 17 07/29/2021    CREATININE 1 46 (H) 07/29/2021     Lab Results   Component Value Date    WBC 9 53 07/29/2021    HGB 13 7 07/29/2021    HCT 43 1 07/29/2021    MCV 93 07/29/2021     07/29/2021           Orders  Orders Placed This Encounter   Procedures    Urine culture     Order Specific Question:   Release to patient through [facility]     Answer:   Immediate    PSA, Total Screen     This is a patient instruction: This test is non-fasting  Please drink two glasses of water morning of bloodwork          Standing Status:   Future     Standing Expiration Date:   8/5/2022    POCT Measure PVR    POCT urine dip       Linh Lynn PA-C

## 2021-08-05 ENCOUNTER — OFFICE VISIT (OUTPATIENT)
Dept: UROLOGY | Facility: CLINIC | Age: 61
End: 2021-08-05

## 2021-08-05 VITALS
BODY MASS INDEX: 43.23 KG/M2 | HEART RATE: 84 BPM | HEIGHT: 70 IN | SYSTOLIC BLOOD PRESSURE: 132 MMHG | DIASTOLIC BLOOD PRESSURE: 74 MMHG | WEIGHT: 302 LBS

## 2021-08-05 DIAGNOSIS — Z12.5 PROSTATE CANCER SCREENING: ICD-10-CM

## 2021-08-05 DIAGNOSIS — N39.498 OTHER URINARY INCONTINENCE: Primary | ICD-10-CM

## 2021-08-05 LAB
POST-VOID RESIDUAL VOLUME, ML POC: 172 ML
SL AMB  POCT GLUCOSE, UA: NORMAL
SL AMB LEUKOCYTE ESTERASE,UA: NORMAL
SL AMB POCT BILIRUBIN,UA: NORMAL
SL AMB POCT BLOOD,UA: NORMAL
SL AMB POCT CLARITY,UA: CLEAR
SL AMB POCT COLOR,UA: NORMAL
SL AMB POCT KETONES,UA: NORMAL
SL AMB POCT NITRITE,UA: NORMAL
SL AMB POCT PH,UA: 6.5
SL AMB POCT SPECIFIC GRAVITY,UA: 1.02
SL AMB POCT URINE PROTEIN: NORMAL
SL AMB POCT UROBILINOGEN: NORMAL

## 2021-08-05 PROCEDURE — 51798 US URINE CAPACITY MEASURE: CPT | Performed by: PHYSICIAN ASSISTANT

## 2021-08-05 PROCEDURE — 99213 OFFICE O/P EST LOW 20 MIN: CPT | Performed by: PHYSICIAN ASSISTANT

## 2021-08-05 PROCEDURE — 81002 URINALYSIS NONAUTO W/O SCOPE: CPT | Performed by: PHYSICIAN ASSISTANT

## 2021-08-05 PROCEDURE — 87086 URINE CULTURE/COLONY COUNT: CPT | Performed by: PHYSICIAN ASSISTANT

## 2021-08-05 RX ORDER — TAMSULOSIN HYDROCHLORIDE 0.4 MG/1
0.4 CAPSULE ORAL
Qty: 30 CAPSULE | Refills: 2 | Status: SHIPPED | OUTPATIENT
Start: 2021-08-05 | End: 2021-09-07

## 2021-08-05 NOTE — LETTER
August 5, 2021     Patient: Chaz Mueller   YOB: 1960   Date of Visit: 8/5/2021       To Whom it May Concern:    Chaz Mueller is under my professional care  He was seen in my office on 8/5/2021  If you have any questions or concerns, please don't hesitate to call         Sincerely,       Angela Uriarte PA-C

## 2021-08-06 ENCOUNTER — HOSPITAL ENCOUNTER (EMERGENCY)
Facility: HOSPITAL | Age: 61
Discharge: HOME | End: 2021-08-06
Attending: EMERGENCY MEDICINE

## 2021-08-06 ENCOUNTER — TELEPHONE (OUTPATIENT)
Dept: FAMILY MEDICINE CLINIC | Facility: CLINIC | Age: 61
End: 2021-08-06

## 2021-08-06 VITALS
RESPIRATION RATE: 18 BRPM | SYSTOLIC BLOOD PRESSURE: 149 MMHG | OXYGEN SATURATION: 98 % | DIASTOLIC BLOOD PRESSURE: 76 MMHG | TEMPERATURE: 98.3 F | HEART RATE: 84 BPM

## 2021-08-06 DIAGNOSIS — K59.00 ACUTE CONSTIPATION: Primary | ICD-10-CM

## 2021-08-06 LAB — BACTERIA UR CULT: NORMAL

## 2021-08-06 PROCEDURE — 99283 EMERGENCY DEPT VISIT LOW MDM: CPT

## 2021-08-06 PROCEDURE — 99284 EMERGENCY DEPT VISIT MOD MDM: CPT | Performed by: EMERGENCY MEDICINE

## 2021-08-06 RX ORDER — POLYETHYLENE GLYCOL 3350, SODIUM CHLORIDE, SODIUM BICARBONATE, POTASSIUM CHLORIDE 420; 11.2; 5.72; 1.48 G/4L; G/4L; G/4L; G/4L
4000 POWDER, FOR SOLUTION ORAL ONCE
Status: COMPLETED | OUTPATIENT
Start: 2021-08-06 | End: 2021-08-06

## 2021-08-06 RX ADMIN — POLYETHYLENE GLYCOL 3350, SODIUM CHLORIDE, SODIUM BICARBONATE AND POTASSIUM CHLORIDE 4000 ML: 420; 5.72; 11.2; 1.48 POWDER, FOR SOLUTION ORAL at 16:31

## 2021-08-06 NOTE — Clinical Note
Nnamdimayo Key was seen and treated in our emergency department on 8/6/2021  Diagnosis:     Brook Dickey  may return to school on return date  He may return on this date: 08/09/2021         If you have any questions or concerns, please don't hesitate to call        Maureen Milner RN    ______________________________           _______________          _______________  Hospital Representative                              Date                                Time

## 2021-08-06 NOTE — TELEPHONE ENCOUNTER
Patient called regarding his continuing issues of trouble urinating , bilateral leg tightness and swelling and constipation for days  Patient does have spine issues  I spoke with Dr Anamaria Henson and advised her of symptoms and review recent MRI from the hospital  She advised patient needs to go back to the ER as his spine may be causing his issues  Patient may need to be cath for urine retention? Needs to be elevated urgently

## 2021-08-06 NOTE — ED PROVIDER NOTES
History  Chief Complaint   Patient presents with    Difficulty Urinating     patient states unable to urinate for over a week, states very hard to pee with burn no discharge; has not had BM in 2 weeks; saw urologist yesterday; seen here last week for leg pain has not got better PCP sent here      81-year-old male presents to constipation  , patient says he has not had a bowel movement about 2 weeks  , has been in out of the hospital   Also into Urology yesterday as he was having difficulty urinating, had postvoid of about 100  Patient says he tried 1 dose of MiraLax and 1 dose of Senokot without resolved  , denies any abdominal pain  No pain to palpation or with ambulation, says he just feels full like he has to have bowel movement but is unable to , no other modifying or alleviating factors  No falls no injury no trauma  , no melena no hematochezia  No previous episodes of constipation  History provided by:  Patient and spouse      Prior to Admission Medications   Prescriptions Last Dose Informant Patient Reported? Taking?    Mirabegron ER 25 MG TB24   No No   Sig: Take 25 mg by mouth daily at bedtime   amLODIPine (NORVASC) 10 mg tablet   No No   Sig: Take 1 tablet (10 mg total) by mouth daily   losartan (COZAAR) 25 mg tablet   No No   Sig: Take 1 tablet (25 mg total) by mouth daily   metFORMIN (GLUCOPHAGE) 500 mg tablet   No No   Sig: Take 1 tablet (500 mg total) by mouth 2 (two) times a day with meals   sitaGLIPtin (JANUVIA) 25 mg tablet   No No   Sig: Take 1 tablet (25 mg total) by mouth daily   Patient not taking: Reported on 7/29/2021   tamsulosin (FLOMAX) 0 4 mg   No No   Sig: Take 1 capsule (0 4 mg total) by mouth daily with dinner      Facility-Administered Medications: None       Past Medical History:   Diagnosis Date    Anxiety     BPH (benign prostatic hyperplasia)     Diabetes mellitus ([facility] [address] )     Difficulty breathing 01/05/2011    Hypertension     Impaired fasting blood sugar     Last Assessed: 2013    Obesity     Sleep apnea        Past Surgical History:   Procedure Laterality Date    COLONOSCOPY      Fiberoptic; Last Assessed: 10/24/2012       Family History   Problem Relation Age of Onset    Heart murmur Mother     Cerebral palsy Mother     Heart disease Mother     Sudden death Mother         Early   [facility] Doming[facility] Breast cancer Sister 48    Pancreatic cancer Maternal Grandmother 61    Emphysema Maternal Grandfather     Hypertension Father     Prostate cancer Father     Colon cancer Neg Hx     Anuerysm Neg Hx     Arrhythmia Neg Hx     Heart failure Neg Hx     Clotting disorder Neg Hx      I have reviewed and agree with the history as documented  E-Cigarette/Vaping    E-Cigarette Use Never User      E-Cigarette/Vaping Substances     Social History     Tobacco Use    Smoking status: Former Smoker     Packs/day: 0 25     Years: 30 00     Pack years: 7 50     Types: Cigarettes     Quit date: 2017     Years since quittin 1    Smokeless tobacco: Never Used   Vaping Use    Vaping Use: Never used   Substance Use Topics    Alcohol use: Yes     Alcohol/week: 6 0 standard drinks     Types: 6 Standard drinks or equivalent per week     Comment: occ    Drug use: No       Review of Systems   Constitutional: Negative for activity change, chills, diaphoresis and fever  HENT: Negative for congestion, sinus pressure and sore throat  Eyes: Negative for pain and visual disturbance  Respiratory: Negative for cough, chest tightness, shortness of breath, wheezing and stridor  Cardiovascular: Negative for chest pain and palpitations  Gastrointestinal: Positive for constipation  Negative for abdominal distention, abdominal pain, diarrhea, nausea and vomiting  Genitourinary: Positive for difficulty urinating  Negative for dysuria and frequency  Musculoskeletal: Negative for neck pain and neck stiffness  Skin: Negative for rash     Neurological: Negative for dizziness, speech difficulty, light-headedness, numbness and headaches  Physical Exam  Physical Exam  Vitals reviewed  Constitutional:       General: He is not in acute distress  Appearance: He is well-developed  He is not diaphoretic  HENT:      Head: Normocephalic and atraumatic  Right Ear: External ear normal       Left Ear: External ear normal       Nose: Nose normal    Eyes:      General:         Right eye: No discharge  Left eye: No discharge  Pupils: Pupils are equal, round, and reactive to light  Neck:      Trachea: No tracheal deviation  Cardiovascular:      Rate and Rhythm: Normal rate and regular rhythm  Heart sounds: Normal heart sounds  No murmur heard  Pulmonary:      Effort: Pulmonary effort is normal  No respiratory distress  Breath sounds: Normal breath sounds  No stridor  Abdominal:      General: There is no distension  Palpations: Abdomen is soft  Tenderness: There is no abdominal tenderness  There is no guarding or rebound  Genitourinary:     Rectum: Guaiac result negative (Brown stool, guaiac negative, no exam evidence of fecal impaction)  Musculoskeletal:         General: Normal range of motion  Cervical back: Normal range of motion and neck supple  Skin:     General: Skin is warm and dry  Coloration: Skin is not pale  Findings: No erythema  Neurological:      General: No focal deficit present  Mental Status: He is alert and oriented to person, place, and time           Vital Signs  ED Triage Vitals [08/06/21 1249]   Temp Pulse Respirations Blood Pressure SpO2   -- 88 18 123/69 97 %      Temp src Heart Rate Source Patient Position - Orthostatic VS BP Location FiO2 (%)   -- Monitor Sitting Left arm --      Pain Score       7           Vitals:    08/06/21 1249   BP: 123/69   Pulse: 88   Patient Position - Orthostatic VS: Sitting         Visual Acuity      ED Medications  Medications   polyethylene glycol-electrolytes (NULYTELY) bowel prep 4,000 mL (has no administration in time range)       Diagnostic Studies  Results Reviewed     None                 No orders to display              Procedures  Procedures         ED Course                             SBIRT 22yo+      Most Recent Value   SBIRT (22 yo +)   In order to provide better care to our patients, we are screening all of our patients for alcohol and drug use  Would it be okay to ask you these screening questions? Yes Filed at: 08/06/2021 1537   Initial Alcohol Screen: US AUDIT-C    1  How often do you have a drink containing alcohol? 1 Filed at: 08/06/2021 1537   2  How many drinks containing alcohol do you have on a typical day you are drinking? 0 Filed at: 08/06/2021 1537   3a  Male UNDER 65: How often do you have five or more drinks on one occasion? 0 Filed at: 08/06/2021 1537   Audit-C Score  1 Filed at: 08/06/2021 1537   JOSÉ: How many times in the past year have you    Used an illegal drug or used a prescription medication for non-medical reasons? Never Filed at: 08/06/2021 1537                    MDM  Number of Diagnoses or Management Options  Acute constipation: new and requires workup  Diagnosis management comments: Patient given an enema here as well as a GoLYTELY prep which she will drink at home  , patient to be discharged advised daily MiraLax until all stool has been removed    Patient follow with Urology on Monday abdomen nontender so doubt serious intra-abdominal pathology he understands and agrees the discharge plan       Amount and/or Complexity of Data Reviewed  Decide to obtain previous medical records or to obtain history from someone other than the patient: yes  Obtain history from someone other than the patient: yes  Review and summarize past medical records: yes        Disposition  Final diagnoses:   Acute constipation     Time reflects when diagnosis was documented in both MDM as applicable and the Disposition within this note     Time User Action Codes Description Comment    8/6/2021  4:01 PM Sravanthi Recinos [K59 00] Acute constipation       ED Disposition     ED Disposition Condition Date/Time Comment    Discharge Stable Fri Aug 6, 2021  4:01 PM Darrick Man discharge to home/self care  Follow-up Information    None         Patient's Medications   Discharge Prescriptions    No medications on file     No discharge procedures on file      PDMP Review     None          ED Provider  Electronically Signed by           Vidhi Maurer DO  08/06/21 3033

## 2021-08-09 ENCOUNTER — OFFICE VISIT (OUTPATIENT)
Dept: FAMILY MEDICINE CLINIC | Facility: CLINIC | Age: 61
End: 2021-08-09

## 2021-08-09 VITALS
SYSTOLIC BLOOD PRESSURE: 130 MMHG | RESPIRATION RATE: 16 BRPM | HEIGHT: 70 IN | OXYGEN SATURATION: 98 % | BODY MASS INDEX: 42.8 KG/M2 | HEART RATE: 77 BPM | WEIGHT: 299 LBS | DIASTOLIC BLOOD PRESSURE: 80 MMHG

## 2021-08-09 DIAGNOSIS — E66.01 MORBID OBESITY WITH BMI OF 40.0-44.9, ADULT (HCC): ICD-10-CM

## 2021-08-09 DIAGNOSIS — R32 URINARY INCONTINENCE, UNSPECIFIED TYPE: ICD-10-CM

## 2021-08-09 DIAGNOSIS — D12.6 ADENOMATOUS POLYP OF COLON, UNSPECIFIED PART OF COLON: Primary | ICD-10-CM

## 2021-08-09 DIAGNOSIS — E11.9 TYPE 2 DIABETES MELLITUS WITHOUT COMPLICATION, WITHOUT LONG-TERM CURRENT USE OF INSULIN (HCC): ICD-10-CM

## 2021-08-09 DIAGNOSIS — R10.30 PAIN RADIATING TO LOWER ABDOMEN: ICD-10-CM

## 2021-08-09 DIAGNOSIS — N39.46 URGE AND STRESS INCONTINENCE: ICD-10-CM

## 2021-08-09 DIAGNOSIS — K59.09 OTHER CONSTIPATION: ICD-10-CM

## 2021-08-09 PROBLEM — R29.898 BILATERAL LEG WEAKNESS: Status: RESOLVED | Noted: 2021-07-29 | Resolved: 2021-08-09

## 2021-08-09 PROBLEM — Z20.822 EXPOSURE TO COVID-19 VIRUS: Status: RESOLVED | Noted: 2020-04-28 | Resolved: 2021-08-09

## 2021-08-09 PROBLEM — R42 DIZZINESS: Status: RESOLVED | Noted: 2017-08-22 | Resolved: 2021-08-09

## 2021-08-09 PROCEDURE — 99212 OFFICE O/P EST SF 10 MIN: CPT | Performed by: FAMILY MEDICINE

## 2021-08-09 RX ORDER — GLIPIZIDE 5 MG/1
5 TABLET ORAL
Qty: 30 TABLET | Refills: 5 | Status: SHIPPED | OUTPATIENT
Start: 2021-08-09 | End: 2022-01-11 | Stop reason: SDUPTHER

## 2021-08-09 NOTE — PROGRESS NOTES
Assessment/Plan:    Problem List Items Addressed This Visit        Digestive    Adenomatous colon polyp - Primary    Relevant Orders    Ambulatory referral for colonoscopy    Ambulatory referral to Gastroenterology   he has some ongoing problem with constipation urine retention difficulty in urination and leg pain, advised to get his colonoscopy from the gastroenterologist, and he should try to take Metamucil every day and drink plenty of fluids, and MiraLax as needed       Endocrine    Type 2 diabetes mellitus without complication, without long-term current use of insulin ([facility])    Relevant Medications    glipiZIDE (GLUCOTROL) 5 mg tablet   as his diabetes not well controlled will add glipizide and he will continue metformin, he has renal insufficiency    Other Relevant Orders    Ambulatory referral to Ophthalmology    Diabetic foot exam    CBC and differential    Comprehensive metabolic panel    Hemoglobin A1C    Lipid panel    Microalbumin / creatinine urine ratio    TSH, 3rd generation       Other    Morbid obesity with BMI of 40 0-44 9, adult ([address] )      Discussed with him about eating healthy diet and losing weight         Urge and stress incontinence    Relevant Orders    CT abdomen wo contrast   as he has multiple symptoms with some abdominal lower part discomfort constipation and urine issues he needs to get the CT scan    Other constipation    Relevant Orders    Ambulatory referral for colonoscopy    Ambulatory referral to Gastroenterology    CT abdomen wo contrast    Pain radiating to lower abdomen    Relevant Orders    CT abdomen wo contrast    RESOLVED: Urinary incontinence       advised to have regular follow ups      TCM Call (since 7/9/2021)     Date and time call was made  8/2/2021  9:35 AM    Hospital care reviewed  Records reviewed    Patient was hospitialized at  [address] [place] [address]        Date of Admission  07/29/21    Date of discharge  07/30/21    Diagnosis  Bilateral leg weakness    Disposition Home    Were the patients medications reviewed and updated  Yes    Current Symptoms  None      TCM Call (since 7/9/2021)     Post hospital issues  None    Should patient be enrolled in anticoag monitoring? No    Scheduled for follow up? Yes    Patients specialists  Urologist    Urologist name  Srinivas Hoover     Did you obtain your prescribed medications  Yes    Do you need help managing your prescriptions or medications  No    Is transportation to your appointment needed  No    I have advised the patient to call PCP with any new or worsening symptoms  ANE RAMIREZ MA    Support System  Family    Do you have social support  Yes, as much as I need    Are you recieving any outpatient services  No    Are you recieving home care services  No    Are you using any community resources  No    Current waiver services  No    Have you fallen in the last 12 months  No    Interperter language line needed  No          Chief Complaint   Patient presents with    Transition of Care Management     BMI Counseling: Body mass index is 42 9 kg/m²  The BMI is above normal  Nutrition recommendations include decreasing portion sizes, encouraging healthy choices of fruits and vegetables, consuming healthier snacks, limiting drinks that contain sugar, moderation in carbohydrate intake and reducing intake of cholesterol  Exercise recommendations include exercising 3-5 times per week  Subjective:   Patient ID: Michael Hall is a 63 y o  male  He came back for follow-up after long time, he had issues with his urine retention and incontinence, and the in he also had severe constipation he was admitted to hospital and then he was again seen in the ER, he is diabetic, not well controlled, he says he use the CPAP machine  He does not have the insurance, he is trying to get the [employer]    He complains of some pain going towards the legs, but there is no swelling in the legs, initially he had weakness in the legs when he went to the hospital which has resolved, he denies any chest pain or shortness of breath  He has hypertension   he feels lower abdominal discomfort    Review of Systems   Constitutional: Negative for activity change, appetite change, chills, fatigue, fever and unexpected weight change  HENT: Negative for congestion, ear discharge, ear pain, nosebleeds, postnasal drip, rhinorrhea, sinus pressure, sneezing, sore throat, trouble swallowing and voice change  Eyes: Negative for photophobia, pain, discharge, redness and itching  Respiratory: Negative for cough, chest tightness, shortness of breath and wheezing  Cardiovascular: Negative for chest pain, palpitations and leg swelling  Gastrointestinal: Negative for abdominal pain, constipation, diarrhea, nausea and vomiting  Endocrine: Negative for polyuria  Genitourinary: Negative for dysuria, frequency and urgency  Musculoskeletal: Negative for arthralgias, back pain, myalgias and neck pain  Pain in the legs   constipation   urine difficulties   Skin: Negative for color change, pallor and rash  Allergic/Immunologic: Negative for environmental allergies and food allergies  Neurological: Negative for dizziness, weakness, light-headedness and headaches  Hematological: Negative for adenopathy  Does not bruise/bleed easily  Psychiatric/Behavioral: Negative for behavioral problems and suicidal ideas  The patient is not nervous/anxious  Objective:  Physical Exam  Vitals and nursing note reviewed  Constitutional:       Appearance: He is well-developed  HENT:      Head: Normocephalic and atraumatic  Nose: Nose normal       Mouth/Throat:      Pharynx: No oropharyngeal exudate  Eyes:      General: No scleral icterus  Right eye: No discharge  Left eye: No discharge  Conjunctiva/sclera: Conjunctivae normal       Pupils: Pupils are equal, round, and reactive to light  Neck:      Thyroid: No thyromegaly  Trachea: No tracheal deviation  Cardiovascular:      Rate and Rhythm: Normal rate and regular rhythm  Heart sounds: Normal heart sounds  No murmur heard  Pulmonary:      Effort: Pulmonary effort is normal  No respiratory distress  Breath sounds: Normal breath sounds  No wheezing or rales  Abdominal:      General: Bowel sounds are normal  There is no distension  Palpations: Abdomen is soft  There is no mass  Tenderness: There is no abdominal tenderness  There is no rebound  Musculoskeletal:         General: Normal range of motion  Cervical back: Normal range of motion and neck supple  Lymphadenopathy:      Cervical: No cervical adenopathy  Skin:     General: Skin is warm  Coloration: Skin is not pale  Findings: No erythema or rash  Neurological:      Mental Status: He is alert and oriented to person, place, and time  Cranial Nerves: No cranial nerve deficit  Deep Tendon Reflexes: Reflexes are normal and symmetric  Psychiatric:         Behavior: Behavior normal          Thought Content:  Thought content normal          Judgment: Judgment normal             Past Surgical History:   Procedure Laterality Date    COLONOSCOPY      Fiberoptic; Last Assessed: 10/24/2012       Family History   Problem Relation Age of Onset    Heart murmur Mother     Cerebral palsy Mother     Heart disease Mother     Sudden death Mother         Early   [facility] Breast cancer Sister 48    Pancreatic cancer Maternal Grandmother 61    Emphysema Maternal Grandfather     Hypertension Father     Prostate cancer Father     Colon cancer Neg Hx     Anuerysm Neg Hx     Arrhythmia Neg Hx     Heart failure Neg Hx     Clotting disorder Neg Hx          Current Outpatient Medications:     amLODIPine (NORVASC) 10 mg tablet, Take 1 tablet (10 mg total) by mouth daily, Disp: 90 tablet, Rfl: 0    losartan (COZAAR) 25 mg tablet, Take 1 tablet (25 mg total) by mouth daily, Disp: 90 tablet, Rfl: 0    metFORMIN (GLUCOPHAGE) 500 mg tablet, Take 1 tablet (500 mg total) by mouth 2 (two) times a day with meals, Disp: 180 tablet, Rfl: 0    Mirabegron ER 25 MG TB24, Take 25 mg by mouth daily at bedtime, Disp: 30 tablet, Rfl: 0    tamsulosin (FLOMAX) 0 4 mg, Take 1 capsule (0 4 mg total) by mouth daily with dinner, Disp: 30 capsule, Rfl: 2    glipiZIDE (GLUCOTROL) 5 mg tablet, Take 1 tablet (5 mg total) by mouth daily with breakfast, Disp: 30 tablet, Rfl: 5    sitaGLIPtin (JANUVIA) 25 mg tablet, Take 1 tablet (25 mg total) by mouth daily (Patient not taking: Reported on 8/9/2021), Disp: 30 tablet, Rfl: 5    Allergies   Allergen Reactions    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

## 2021-08-10 ENCOUNTER — TELEPHONE (OUTPATIENT)
Dept: GASTROENTEROLOGY | Facility: CLINIC | Age: 61
End: 2021-08-10

## 2021-08-10 NOTE — TELEPHONE ENCOUNTER
Called and spoke with patient regarding moving his appointment up to end of September and putting him on cancellation list  Unfortunately he can only come Tuesday   I had no Tuesday sooner appointments but put on the cancellation list

## 2021-08-12 ENCOUNTER — TELEPHONE (OUTPATIENT)
Dept: NEUROLOGY | Facility: CLINIC | Age: 61
End: 2021-08-12

## 2021-08-12 NOTE — TELEPHONE ENCOUNTER
1ST ATTEMPT LMOM for pt to call in to sched HFU appt  SLRA/ Bilateral lower extremity weakness/VERIFY INSURANCE???    NOTE FROM CHART:  Reason for Consult / Principal Problem:  Bilateral lower extremity weakness  This patient needs to follow-up with his family doctor and the reengaged with both his doctor as well as his medication program   Should this bilateral lower extremity weakness her again he can be scheduled for an EMG but he should start a physical therapy program for his lower back at that time

## 2021-08-13 NOTE — TELEPHONE ENCOUNTER
2ND ATTEMPT LMOM for pt to call in to sched HFU appt      SLRA/ Bilateral lower extremity weakness/VERIFY INSURANCE???

## 2021-08-16 NOTE — TELEPHONE ENCOUNTER
3RD ATTEMPT LMOM for pt to call in to sched HFU appt    Mailed 3rd attempt letter to pt's home       SLRA/ Bilateral lower extremity weakness/VERIFY INSURANCE???

## 2021-08-25 ENCOUNTER — TELEPHONE (OUTPATIENT)
Dept: OTHER | Facility: OTHER | Age: 61
End: 2021-08-25

## 2021-08-25 NOTE — TELEPHONE ENCOUNTER
Patient called back in to get appointment scheduled from prior conversation today  Next available appointment that worked for patient was 9/7

## 2021-08-25 NOTE — TELEPHONE ENCOUNTER
4TH ATTEMPT LMOM for pt to call in to sched HFU appt  Received VM from pt, he isn't happen that each time I called him he didn't answer and I had to leave a VM  He stated he called in 4 times, I only received 2 VM from pt from last night 8/24/2021   Returned pt's call first thing in the morning       SLRA/ Bilateral lower extremity weakness/VERIFY INSURANCE???

## 2021-08-31 ENCOUNTER — HOSPITAL ENCOUNTER (OUTPATIENT)
Dept: CT IMAGING | Facility: HOSPITAL | Age: 61
Discharge: HOME | End: 2021-08-31

## 2021-08-31 DIAGNOSIS — N39.46 URGE AND STRESS INCONTINENCE: ICD-10-CM

## 2021-08-31 DIAGNOSIS — K59.09 OTHER CONSTIPATION: ICD-10-CM

## 2021-08-31 DIAGNOSIS — R10.30 PAIN RADIATING TO LOWER ABDOMEN: ICD-10-CM

## 2021-08-31 PROCEDURE — 74150 CT ABDOMEN W/O CONTRAST: CPT

## 2021-08-31 PROCEDURE — G1004 CDSM NDSC: HCPCS

## 2021-09-07 ENCOUNTER — OFFICE VISIT (OUTPATIENT)
Dept: UROLOGY | Facility: CLINIC | Age: 61
End: 2021-09-07

## 2021-09-07 ENCOUNTER — TELEMEDICINE (OUTPATIENT)
Dept: FAMILY MEDICINE CLINIC | Facility: CLINIC | Age: 61
End: 2021-09-07

## 2021-09-07 VITALS
WEIGHT: 291 LBS | HEIGHT: 70 IN | BODY MASS INDEX: 41.66 KG/M2 | SYSTOLIC BLOOD PRESSURE: 132 MMHG | DIASTOLIC BLOOD PRESSURE: 84 MMHG

## 2021-09-07 VITALS — WEIGHT: 291 LBS | SYSTOLIC BLOOD PRESSURE: 132 MMHG | DIASTOLIC BLOOD PRESSURE: 84 MMHG | BODY MASS INDEX: 41.75 KG/M2

## 2021-09-07 DIAGNOSIS — N40.1 BPH WITH OBSTRUCTION/LOWER URINARY TRACT SYMPTOMS: Primary | ICD-10-CM

## 2021-09-07 DIAGNOSIS — N13.8 BPH WITH OBSTRUCTION/LOWER URINARY TRACT SYMPTOMS: Primary | ICD-10-CM

## 2021-09-07 DIAGNOSIS — E11.9 TYPE 2 DIABETES MELLITUS WITHOUT COMPLICATION, WITHOUT LONG-TERM CURRENT USE OF INSULIN (HCC): Primary | ICD-10-CM

## 2021-09-07 DIAGNOSIS — K59.09 OTHER CONSTIPATION: ICD-10-CM

## 2021-09-07 DIAGNOSIS — N39.46 URGE AND STRESS INCONTINENCE: ICD-10-CM

## 2021-09-07 PROBLEM — R10.30 PAIN RADIATING TO LOWER ABDOMEN: Status: RESOLVED | Noted: 2021-08-09 | Resolved: 2021-09-07

## 2021-09-07 PROCEDURE — 99213 OFFICE O/P EST LOW 20 MIN: CPT | Performed by: FAMILY MEDICINE

## 2021-09-07 PROCEDURE — 99213 OFFICE O/P EST LOW 20 MIN: CPT | Performed by: PHYSICIAN ASSISTANT

## 2021-09-07 NOTE — PROGRESS NOTES
UROLOGY PROGRESS NOTE   Patient Identifiers: Jovanny Rosario (MRN [id number])  Date of Service: 9/7/2021    Subjective:     53-year-old obese male with lower urinary tract symptoms weak stream and incomplete bladder emptying  His PVR was 172 when he was seen by the advanced practitioner 1 month ago  The very next day he went to the emergency room stating he was unable to urinate  Postvoid residual was about 100  He is obese diabetic and has sleep apnea   He is currently not on any medications      Reason for visit:  Lower urinary tract symptom follow-up     Objective:     VITALS:    Vitals:    09/07/21 0905   BP: 132/84           LABS:  Lab Results   Component Value Date    HGB 13 7 07/29/2021    HCT 43 1 07/29/2021    WBC 9 53 07/29/2021     07/29/2021   ]    Lab Results   Component Value Date    K 4 3 07/29/2021     07/29/2021    CO2 29 07/29/2021    BUN 17 07/29/2021    CREATININE 1 46 (H) 07/29/2021    CALCIUM 9 1 07/29/2021   ]        INPATIENT MEDS:    Current Outpatient Medications:     amLODIPine (NORVASC) 10 mg tablet, Take 1 tablet (10 mg total) by mouth daily, Disp: 90 tablet, Rfl: 0    glipiZIDE (GLUCOTROL) 5 mg tablet, Take 1 tablet (5 mg total) by mouth daily with breakfast, Disp: 30 tablet, Rfl: 5    losartan (COZAAR) 25 mg tablet, Take 1 tablet (25 mg total) by mouth daily, Disp: 90 tablet, Rfl: 0    metFORMIN (GLUCOPHAGE) 500 mg tablet, Take 1 tablet (500 mg total) by mouth 2 (two) times a day with meals, Disp: 180 tablet, Rfl: 0    sitaGLIPtin (JANUVIA) 25 mg tablet, Take 1 tablet (25 mg total) by mouth daily, Disp: 30 tablet, Rfl: 5      Physical Exam:   /84 (BP Location: Left arm, Patient Position: Sitting, Cuff Size: Adult)   Ht 5' 10" (1 778 m)   Wt 132 kg (291 lb)   BMI 41 75 kg/m²   GEN: no acute distress    RESP: breathing comfortably with no accessory muscle use    ABD: soft, non-tender, non-distended   INCISION:    EXT: no significant peripheral edema RADIOLOGY:     He had a CT scan last week of the abdomen which showed no  Hydronephrosis  Assessment: 1  Voiding dysfunction   2   BPH    Plan:   - he does not wish to take any other medications at this time  - he will call me if he changes his mind  - I scheduled him for a cystoscopy uroflow and transrectal ultrasound  -

## 2021-09-07 NOTE — PROGRESS NOTES
Virtual Regular Visit    Verification of patient location:    Patient is located in the following state in which I hold an active license [place]      Assessment/Plan:    Problem List Items Addressed This Visit        Endocrine    Type 2 diabetes mellitus without complication, without long-term current use of insulin ([facility] [address] ) - Primary       Lab Results   Component Value Date    HGBA1C 7 0 (H) 07/29/2021   Continue continue same medication and advised to get labs soon and follow up then            Other    Urge and stress incontinence     He is following with the urologist now         Other constipation     He will follow-up with the gastroenterologist, he has significant constipation he goes only 1 time to the bathroom in a week, advised to use MiraLax as needed and takes stool softener every day, he will get the labs and follow-up           CT scan abdomen report is discussed with him, he has diverticulosis but no diverticulitis, he has a small cyst in the liver, he will get the labs and then follow-up discussed about relieving constipation and follow-up with gastroenterologist, advised to avoid any food with the seeds         Reason for visit is   Chief Complaint   Patient presents with    Follow-up     ct scan    Virtual Regular Visit        Encounter provider Paula Dacosta MD    Provider located at [address] [place] [id number]      Recent Visits  No visits were found meeting these conditions  Showing recent visits within past 7 days and meeting all other requirements  Today's Visits  Date Type Provider Dept   09/07/21 Telemedicine Paula Dacosta MD [facility]   Showing today's visits and meeting all other requirements  Future Appointments  No visits were found meeting these conditions  Showing future appointments within next 150 days and meeting all other requirements       The patient was identified by name and date of birth   Oleg Eryns was informed that this is a telemedicine visit and that the visit is being conducted through [address] [facility] and patient was informed that this is a secure, HIPAA-compliant platform  He agrees to proceed     My office door was closed  No one else was in the room  He acknowledged consent and understanding of privacy and security of the video platform  The patient has agreed to participate and understands they can discontinue the visit at any time  Patient is aware this is a billable service  Subjective  Sully Young is a 63 y o  male he went for the CT scan of abdomen as he was complaining of abdominal discomfort constipation and some urine issues, he has appointment with urologist and he will probably go for a cystoscopy,  He did not go for the labs, he was given the glipizide last time and he is tolerating well for diabetes and is also taking metformin, he is due for labs he says he is still constipated he should try to take over-the-counter some laxative but that does not help, and he tries to eat lot of fibers     He has made appointment for gastroenterologist        Past Medical History:   Diagnosis Date    Anxiety     BPH (benign prostatic hyperplasia)     Diabetes mellitus ([facility] [address] )     Difficulty breathing 01/05/2011    Hypertension     Impaired fasting blood sugar     Last Assessed: 1/2/2013    Obesity     Sleep apnea        Past Surgical History:   Procedure Laterality Date    COLONOSCOPY      Fiberoptic; Last Assessed: 10/24/2012       Current Outpatient Medications   Medication Sig Dispense Refill    amLODIPine (NORVASC) 10 mg tablet Take 1 tablet (10 mg total) by mouth daily 90 tablet 0    glipiZIDE (GLUCOTROL) 5 mg tablet Take 1 tablet (5 mg total) by mouth daily with breakfast 30 tablet 5    losartan (COZAAR) 25 mg tablet Take 1 tablet (25 mg total) by mouth daily 90 tablet 0    metFORMIN (GLUCOPHAGE) 500 mg tablet Take 1 tablet (500 mg total) by mouth 2 (two) times a day with meals 180 tablet 0    sitaGLIPtin (JANUVIA) 25 mg tablet Take 1 tablet (25 mg total) by mouth daily 30 tablet 5     No current facility-administered medications for this visit  Allergies   Allergen Reactions    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     Annotation - [id number]: Allergic to nuts, ok with Peanuts and Almonds       Review of Systems   Constitutional: Negative for activity change, appetite change, chills, fatigue, fever and unexpected weight change  HENT: Negative for congestion, ear discharge, ear pain, nosebleeds, postnasal drip, rhinorrhea, sinus pressure, sneezing, sore throat, trouble swallowing and voice change  Eyes: Negative for photophobia, pain, discharge, redness and itching  Respiratory: Negative for cough, chest tightness, shortness of breath and wheezing  Cardiovascular: Negative for chest pain, palpitations and leg swelling  Gastrointestinal: Negative for abdominal pain, constipation, diarrhea, nausea and vomiting  Endocrine: Negative for polyuria  Genitourinary: Negative for dysuria, frequency and urgency  Musculoskeletal: Negative for arthralgias, back pain, myalgias and neck pain  Skin: Negative for color change, pallor and rash  Allergic/Immunologic: Negative for environmental allergies and food allergies  Neurological: Negative for dizziness, weakness, light-headedness and headaches  Hematological: Negative for adenopathy  Does not bruise/bleed easily  Psychiatric/Behavioral: Negative for behavioral problems  The patient is not nervous/anxious  Video Exam    Vitals:    09/07/21 0916   BP: 132/84   Weight: 132 kg (291 lb)       Physical Exam  Constitutional:       Appearance: He is obese  HENT:      Nose: No rhinorrhea  Eyes:      Extraocular Movements: Extraocular movements intact  Pulmonary:      Effort: Pulmonary effort is normal  No respiratory distress  Skin:     Coloration: Skin is not jaundiced or pale     Neurological:      General: No focal deficit present  Psychiatric:         Mood and Affect: Mood normal           I spent 15 minutes directly with the patient during this visit    VIRTUAL VISIT DISCLAIMER      Ignacio Gotti verbally agrees to participate in [employer]  Pt is aware that [employer] could be limited without vital signs or the ability to perform a full hands-on physical Ezequielnorma Deluna understands he or the provider may request at any time to terminate the video visit and request the patient to seek care or treatment in person

## 2021-09-07 NOTE — ASSESSMENT & PLAN NOTE
He will follow-up with the gastroenterologist, he has significant constipation he goes only 1 time to the bathroom in a week, advised to use MiraLax as needed and takes stool softener every day, he will get the labs and follow-up

## 2021-09-07 NOTE — ASSESSMENT & PLAN NOTE
Lab Results   Component Value Date    HGBA1C 7 0 (H) 07/29/2021   Continue continue same medication and advised to get labs soon and follow up then

## 2021-09-13 ENCOUNTER — TELEPHONE (OUTPATIENT)
Dept: NEPHROLOGY | Facility: CLINIC | Age: 61
End: 2021-09-13

## 2021-09-13 ENCOUNTER — APPOINTMENT (OUTPATIENT)
Dept: LAB | Facility: CLINIC | Age: 61
End: 2021-09-13

## 2021-09-13 DIAGNOSIS — E11.9 TYPE 2 DIABETES MELLITUS WITHOUT COMPLICATION, WITHOUT LONG-TERM CURRENT USE OF INSULIN (HCC): ICD-10-CM

## 2021-09-13 LAB
ALBUMIN SERPL BCP-MCNC: 3.8 G/DL (ref 3.5–5)
ALP SERPL-CCNC: 79 U/L (ref 46–116)
ALT SERPL W P-5'-P-CCNC: 30 U/L (ref 12–78)
ANION GAP SERPL CALCULATED.3IONS-SCNC: 4 MMOL/L (ref 4–13)
AST SERPL W P-5'-P-CCNC: 26 U/L (ref 5–45)
BASOPHILS # BLD AUTO: 0.04 THOUSANDS/ΜL (ref 0–0.1)
BASOPHILS NFR BLD AUTO: 1 % (ref 0–1)
BILIRUB SERPL-MCNC: 0.44 MG/DL (ref 0.2–1)
BUN SERPL-MCNC: 23 MG/DL (ref 5–25)
CALCIUM SERPL-MCNC: 9.6 MG/DL (ref 8.3–10.1)
CHLORIDE SERPL-SCNC: 107 MMOL/L (ref 100–108)
CHOLEST SERPL-MCNC: 132 MG/DL (ref 50–200)
CO2 SERPL-SCNC: 25 MMOL/L (ref 21–32)
CREAT SERPL-MCNC: 1.53 MG/DL (ref 0.6–1.3)
EOSINOPHIL # BLD AUTO: 0.14 THOUSAND/ΜL (ref 0–0.61)
EOSINOPHIL NFR BLD AUTO: 2 % (ref 0–6)
ERYTHROCYTE [DISTWIDTH] IN BLOOD BY AUTOMATED COUNT: 13.8 % (ref 11.6–15.1)
EST. AVERAGE GLUCOSE BLD GHB EST-MCNC: 143 MG/DL
GFR SERPL CREATININE-BSD FRML MDRD: 57 ML/MIN/1.73SQ M
GLUCOSE P FAST SERPL-MCNC: 101 MG/DL (ref 65–99)
HBA1C MFR BLD: 6.6 %
HCT VFR BLD AUTO: 41.8 % (ref 36.5–49.3)
HDLC SERPL-MCNC: 36 MG/DL
HGB BLD-MCNC: 13.2 G/DL (ref 12–17)
IMM GRANULOCYTES # BLD AUTO: 0.02 THOUSAND/UL (ref 0–0.2)
IMM GRANULOCYTES NFR BLD AUTO: 0 % (ref 0–2)
LDLC SERPL CALC-MCNC: 81 MG/DL (ref 0–100)
LYMPHOCYTES # BLD AUTO: 2.35 THOUSANDS/ΜL (ref 0.6–4.47)
LYMPHOCYTES NFR BLD AUTO: 31 % (ref 14–44)
MCH RBC QN AUTO: 29.8 PG (ref 26.8–34.3)
MCHC RBC AUTO-ENTMCNC: 31.6 G/DL (ref 31.4–37.4)
MCV RBC AUTO: 94 FL (ref 82–98)
MONOCYTES # BLD AUTO: 0.62 THOUSAND/ΜL (ref 0.17–1.22)
MONOCYTES NFR BLD AUTO: 8 % (ref 4–12)
NEUTROPHILS # BLD AUTO: 4.42 THOUSANDS/ΜL (ref 1.85–7.62)
NEUTS SEG NFR BLD AUTO: 58 % (ref 43–75)
NONHDLC SERPL-MCNC: 96 MG/DL
NRBC BLD AUTO-RTO: 0 /100 WBCS
PLATELET # BLD AUTO: 182 THOUSANDS/UL (ref 149–390)
PMV BLD AUTO: 11.8 FL (ref 8.9–12.7)
POTASSIUM SERPL-SCNC: 4.6 MMOL/L (ref 3.5–5.3)
PROT SERPL-MCNC: 8.6 G/DL (ref 6.4–8.2)
RBC # BLD AUTO: 4.43 MILLION/UL (ref 3.88–5.62)
SODIUM SERPL-SCNC: 136 MMOL/L (ref 136–145)
TRIGL SERPL-MCNC: 73 MG/DL
TSH SERPL DL<=0.05 MIU/L-ACNC: 1.23 UIU/ML (ref 0.36–3.74)
WBC # BLD AUTO: 7.59 THOUSAND/UL (ref 4.31–10.16)

## 2021-09-13 PROCEDURE — 80061 LIPID PANEL: CPT

## 2021-09-13 PROCEDURE — 85025 COMPLETE CBC W/AUTO DIFF WBC: CPT

## 2021-09-13 PROCEDURE — 36415 COLL VENOUS BLD VENIPUNCTURE: CPT

## 2021-09-13 PROCEDURE — 84443 ASSAY THYROID STIM HORMONE: CPT

## 2021-09-13 PROCEDURE — 83036 HEMOGLOBIN GLYCOSYLATED A1C: CPT

## 2021-09-13 PROCEDURE — 80053 COMPREHEN METABOLIC PANEL: CPT

## 2021-09-13 NOTE — TELEPHONE ENCOUNTER
----- Message from Power Barnett MD sent at 9/13/2021  2:18 PM EDT -----  Patient was seen by me once in 2020, please arrange for follow-up    Please order for repeat BMP and upc ratio before the office visit

## 2021-10-04 ENCOUNTER — OFFICE VISIT (OUTPATIENT)
Dept: FAMILY MEDICINE CLINIC | Facility: CLINIC | Age: 61
End: 2021-10-04

## 2021-10-04 VITALS
HEIGHT: 70 IN | WEIGHT: 294 LBS | BODY MASS INDEX: 42.09 KG/M2 | OXYGEN SATURATION: 98 % | HEART RATE: 71 BPM | RESPIRATION RATE: 16 BRPM | SYSTOLIC BLOOD PRESSURE: 124 MMHG | DIASTOLIC BLOOD PRESSURE: 70 MMHG

## 2021-10-04 DIAGNOSIS — I10 ESSENTIAL HYPERTENSION: ICD-10-CM

## 2021-10-04 DIAGNOSIS — B35.1 ONYCHOMYCOSIS: ICD-10-CM

## 2021-10-04 DIAGNOSIS — Z11.4 SCREENING FOR HIV (HUMAN IMMUNODEFICIENCY VIRUS): ICD-10-CM

## 2021-10-04 DIAGNOSIS — N28.9 RENAL INSUFFICIENCY: ICD-10-CM

## 2021-10-04 DIAGNOSIS — G47.33 OSA (OBSTRUCTIVE SLEEP APNEA): ICD-10-CM

## 2021-10-04 DIAGNOSIS — E11.9 TYPE 2 DIABETES MELLITUS WITHOUT COMPLICATION, WITHOUT LONG-TERM CURRENT USE OF INSULIN (HCC): Primary | ICD-10-CM

## 2021-10-04 PROCEDURE — 99213 OFFICE O/P EST LOW 20 MIN: CPT | Performed by: FAMILY MEDICINE

## 2021-10-05 ENCOUNTER — OFFICE VISIT (OUTPATIENT)
Dept: GASTROENTEROLOGY | Facility: CLINIC | Age: 61
End: 2021-10-05

## 2021-10-05 VITALS
BODY MASS INDEX: 42.09 KG/M2 | WEIGHT: 294 LBS | DIASTOLIC BLOOD PRESSURE: 94 MMHG | HEART RATE: 69 BPM | HEIGHT: 70 IN | SYSTOLIC BLOOD PRESSURE: 145 MMHG

## 2021-10-05 DIAGNOSIS — G47.33 OSA (OBSTRUCTIVE SLEEP APNEA): ICD-10-CM

## 2021-10-05 DIAGNOSIS — K59.00 CONSTIPATION, UNSPECIFIED CONSTIPATION TYPE: ICD-10-CM

## 2021-10-05 DIAGNOSIS — E11.9 TYPE 2 DIABETES MELLITUS WITHOUT COMPLICATION, WITHOUT LONG-TERM CURRENT USE OF INSULIN (HCC): ICD-10-CM

## 2021-10-05 DIAGNOSIS — R19.8 CHANGE IN BOWEL FUNCTION: ICD-10-CM

## 2021-10-05 DIAGNOSIS — K57.90 DIVERTICULAR DISEASE: ICD-10-CM

## 2021-10-05 DIAGNOSIS — Z12.11 SCREENING FOR MALIGNANT NEOPLASM OF COLON: Primary | ICD-10-CM

## 2021-10-05 PROCEDURE — 99244 OFF/OP CNSLTJ NEW/EST MOD 40: CPT | Performed by: INTERNAL MEDICINE

## 2021-10-05 RX ORDER — POLYETHYLENE GLYCOL 3350 17 G/17G
POWDER, FOR SOLUTION ORAL
Qty: 238 G | Refills: 0 | Status: SHIPPED | OUTPATIENT
Start: 2021-10-05 | End: 2022-01-11 | Stop reason: ALTCHOICE

## 2021-10-08 ENCOUNTER — TELEPHONE (OUTPATIENT)
Dept: NEPHROLOGY | Facility: CLINIC | Age: 61
End: 2021-10-08

## 2021-10-11 ENCOUNTER — APPOINTMENT (OUTPATIENT)
Dept: LAB | Facility: CLINIC | Age: 61
End: 2021-10-11

## 2021-10-13 ENCOUNTER — TELEPHONE (OUTPATIENT)
Dept: UROLOGY | Facility: MEDICAL CENTER | Age: 61
End: 2021-10-13

## 2021-10-13 ENCOUNTER — PROCEDURE VISIT (OUTPATIENT)
Dept: UROLOGY | Facility: CLINIC | Age: 61
End: 2021-10-13

## 2021-10-13 VITALS
HEART RATE: 74 BPM | WEIGHT: 297.6 LBS | DIASTOLIC BLOOD PRESSURE: 82 MMHG | BODY MASS INDEX: 42.61 KG/M2 | HEIGHT: 70 IN | SYSTOLIC BLOOD PRESSURE: 136 MMHG

## 2021-10-13 DIAGNOSIS — N32.81 OAB (OVERACTIVE BLADDER): Primary | ICD-10-CM

## 2021-10-13 DIAGNOSIS — N40.1 BPH WITH OBSTRUCTION/LOWER URINARY TRACT SYMPTOMS: ICD-10-CM

## 2021-10-13 DIAGNOSIS — N39.498 OTHER URINARY INCONTINENCE: ICD-10-CM

## 2021-10-13 DIAGNOSIS — N13.8 BPH WITH OBSTRUCTION/LOWER URINARY TRACT SYMPTOMS: ICD-10-CM

## 2021-10-13 PROCEDURE — 99214 OFFICE O/P EST MOD 30 MIN: CPT | Performed by: UROLOGY

## 2021-10-13 PROCEDURE — 76872 US TRANSRECTAL: CPT | Performed by: UROLOGY

## 2021-10-13 PROCEDURE — 52000 CYSTOURETHROSCOPY: CPT | Performed by: UROLOGY

## 2021-10-14 ENCOUNTER — OFFICE VISIT (OUTPATIENT)
Dept: NEPHROLOGY | Facility: CLINIC | Age: 61
End: 2021-10-14

## 2021-10-14 VITALS
WEIGHT: 299.8 LBS | SYSTOLIC BLOOD PRESSURE: 140 MMHG | BODY MASS INDEX: 42.92 KG/M2 | HEIGHT: 70 IN | DIASTOLIC BLOOD PRESSURE: 88 MMHG

## 2021-10-14 DIAGNOSIS — N18.30 BENIGN HYPERTENSION WITH CHRONIC KIDNEY DISEASE, STAGE III (HCC): ICD-10-CM

## 2021-10-14 DIAGNOSIS — N18.30 CONTROLLED TYPE 2 DIABETES MELLITUS WITH STAGE 3 CHRONIC KIDNEY DISEASE, WITHOUT LONG-TERM CURRENT USE OF INSULIN (HCC): ICD-10-CM

## 2021-10-14 DIAGNOSIS — N40.1 BPH WITH OBSTRUCTION/LOWER URINARY TRACT SYMPTOMS: ICD-10-CM

## 2021-10-14 DIAGNOSIS — E11.22 CONTROLLED TYPE 2 DIABETES MELLITUS WITH STAGE 3 CHRONIC KIDNEY DISEASE, WITHOUT LONG-TERM CURRENT USE OF INSULIN (HCC): ICD-10-CM

## 2021-10-14 DIAGNOSIS — I12.9 BENIGN HYPERTENSION WITH CHRONIC KIDNEY DISEASE, STAGE III (HCC): ICD-10-CM

## 2021-10-14 DIAGNOSIS — N18.31 STAGE 3A CHRONIC KIDNEY DISEASE (HCC): Primary | ICD-10-CM

## 2021-10-14 DIAGNOSIS — N13.8 BPH WITH OBSTRUCTION/LOWER URINARY TRACT SYMPTOMS: ICD-10-CM

## 2021-10-14 PROCEDURE — 99214 OFFICE O/P EST MOD 30 MIN: CPT | Performed by: INTERNAL MEDICINE

## 2021-10-14 RX ORDER — LOSARTAN POTASSIUM 25 MG/1
25 TABLET ORAL DAILY
Qty: 90 TABLET | Refills: 3 | Status: SHIPPED | OUTPATIENT
Start: 2021-10-14

## 2021-10-15 ENCOUNTER — TELEPHONE (OUTPATIENT)
Dept: NEPHROLOGY | Facility: CLINIC | Age: 61
End: 2021-10-15

## 2021-10-18 ENCOUNTER — TELEPHONE (OUTPATIENT)
Dept: GASTROENTEROLOGY | Facility: HOSPITAL | Age: 61
End: 2021-10-18

## 2021-10-18 DIAGNOSIS — N18.31 STAGE 3A CHRONIC KIDNEY DISEASE (HCC): Primary | ICD-10-CM

## 2021-10-18 DIAGNOSIS — E11.22 CONTROLLED TYPE 2 DIABETES MELLITUS WITH STAGE 3 CHRONIC KIDNEY DISEASE, WITHOUT LONG-TERM CURRENT USE OF INSULIN (HCC): ICD-10-CM

## 2021-10-18 DIAGNOSIS — N18.30 CONTROLLED TYPE 2 DIABETES MELLITUS WITH STAGE 3 CHRONIC KIDNEY DISEASE, WITHOUT LONG-TERM CURRENT USE OF INSULIN (HCC): ICD-10-CM

## 2021-10-18 RX ORDER — EMPAGLIFLOZIN 10 MG/1
10 TABLET, FILM COATED ORAL EVERY MORNING
Qty: 90 TABLET | Refills: 3 | Status: SHIPPED | OUTPATIENT
Start: 2021-10-18

## 2021-10-19 ENCOUNTER — OFFICE VISIT (OUTPATIENT)
Dept: PODIATRY | Facility: CLINIC | Age: 61
End: 2021-10-19

## 2021-10-19 VITALS
HEIGHT: 70 IN | HEART RATE: 77 BPM | SYSTOLIC BLOOD PRESSURE: 122 MMHG | BODY MASS INDEX: 42.43 KG/M2 | WEIGHT: 296.4 LBS | DIASTOLIC BLOOD PRESSURE: 77 MMHG

## 2021-10-19 DIAGNOSIS — E11.9 TYPE 2 DIABETES MELLITUS WITHOUT COMPLICATION, WITHOUT LONG-TERM CURRENT USE OF INSULIN (HCC): ICD-10-CM

## 2021-10-19 DIAGNOSIS — B35.1 ONYCHOMYCOSIS: ICD-10-CM

## 2021-10-19 PROCEDURE — 99203 OFFICE O/P NEW LOW 30 MIN: CPT | Performed by: PODIATRIST

## 2021-10-22 ENCOUNTER — TELEPHONE (OUTPATIENT)
Dept: GASTROENTEROLOGY | Facility: HOSPITAL | Age: 61
End: 2021-10-22

## 2021-10-25 ENCOUNTER — ANESTHESIA EVENT (OUTPATIENT)
Dept: GASTROENTEROLOGY | Facility: HOSPITAL | Age: 61
End: 2021-10-25

## 2021-10-25 ENCOUNTER — HOSPITAL ENCOUNTER (OUTPATIENT)
Dept: GASTROENTEROLOGY | Facility: HOSPITAL | Age: 61
Setting detail: OUTPATIENT SURGERY
Discharge: HOME | End: 2021-10-25
Attending: INTERNAL MEDICINE | Admitting: INTERNAL MEDICINE

## 2021-10-25 ENCOUNTER — ANESTHESIA (OUTPATIENT)
Dept: GASTROENTEROLOGY | Facility: HOSPITAL | Age: 61
End: 2021-10-25

## 2021-10-25 VITALS
TEMPERATURE: 99 F | BODY MASS INDEX: 42.46 KG/M2 | WEIGHT: 296.6 LBS | SYSTOLIC BLOOD PRESSURE: 139 MMHG | OXYGEN SATURATION: 99 % | HEART RATE: 78 BPM | DIASTOLIC BLOOD PRESSURE: 84 MMHG | RESPIRATION RATE: 14 BRPM | HEIGHT: 70 IN

## 2021-10-25 DIAGNOSIS — K59.00 CONSTIPATION, UNSPECIFIED CONSTIPATION TYPE: ICD-10-CM

## 2021-10-25 DIAGNOSIS — Z12.11 SCREENING FOR MALIGNANT NEOPLASM OF COLON: ICD-10-CM

## 2021-10-25 LAB — GLUCOSE SERPL-MCNC: 127 MG/DL (ref 65–140)

## 2021-10-25 PROCEDURE — 45385 COLONOSCOPY W/LESION REMOVAL: CPT | Performed by: INTERNAL MEDICINE

## 2021-10-25 PROCEDURE — 82948 REAGENT STRIP/BLOOD GLUCOSE: CPT

## 2021-10-25 PROCEDURE — 88305 TISSUE EXAM BY PATHOLOGIST: CPT | Performed by: PATHOLOGY

## 2021-10-25 PROCEDURE — 45380 COLONOSCOPY AND BIOPSY: CPT | Performed by: INTERNAL MEDICINE

## 2021-10-25 RX ORDER — LIDOCAINE HYDROCHLORIDE 10 MG/ML
0.5 INJECTION, SOLUTION EPIDURAL; INFILTRATION; INTRACAUDAL; PERINEURAL ONCE AS NEEDED
Status: DISCONTINUED | OUTPATIENT
Start: 2021-10-25 | End: 2021-10-29 | Stop reason: HOSPADM

## 2021-10-25 RX ORDER — PROPOFOL 10 MG/ML
INJECTION, EMULSION INTRAVENOUS AS NEEDED
Status: DISCONTINUED | OUTPATIENT
Start: 2021-10-25 | End: 2021-10-25

## 2021-10-25 RX ORDER — LIDOCAINE HYDROCHLORIDE 10 MG/ML
INJECTION, SOLUTION EPIDURAL; INFILTRATION; INTRACAUDAL; PERINEURAL AS NEEDED
Status: DISCONTINUED | OUTPATIENT
Start: 2021-10-25 | End: 2021-10-25

## 2021-10-25 RX ORDER — SODIUM CHLORIDE, SODIUM LACTATE, POTASSIUM CHLORIDE, CALCIUM CHLORIDE 600; 310; 30; 20 MG/100ML; MG/100ML; MG/100ML; MG/100ML
125 INJECTION, SOLUTION INTRAVENOUS CONTINUOUS
Status: DISCONTINUED | OUTPATIENT
Start: 2021-10-25 | End: 2021-10-29 | Stop reason: HOSPADM

## 2021-10-25 RX ADMIN — PROPOFOL 30 MG: 10 INJECTION, EMULSION INTRAVENOUS at 09:13

## 2021-10-25 RX ADMIN — LIDOCAINE HYDROCHLORIDE 50 MG: 10 INJECTION, SOLUTION EPIDURAL; INFILTRATION; INTRACAUDAL; PERINEURAL at 09:07

## 2021-10-25 RX ADMIN — PROPOFOL 80 MG: 10 INJECTION, EMULSION INTRAVENOUS at 09:07

## 2021-10-25 RX ADMIN — PROPOFOL 40 MG: 10 INJECTION, EMULSION INTRAVENOUS at 09:09

## 2021-10-25 RX ADMIN — PROPOFOL 20 MG: 10 INJECTION, EMULSION INTRAVENOUS at 09:11

## 2021-10-25 RX ADMIN — SODIUM CHLORIDE, SODIUM LACTATE, POTASSIUM CHLORIDE, AND CALCIUM CHLORIDE 125 ML/HR: .6; .31; .03; .02 INJECTION, SOLUTION INTRAVENOUS at 08:21

## 2021-10-25 RX ADMIN — PROPOFOL 30 MG: 10 INJECTION, EMULSION INTRAVENOUS at 09:15

## 2021-11-01 DIAGNOSIS — I10 ESSENTIAL HYPERTENSION: ICD-10-CM

## 2021-11-01 RX ORDER — AMLODIPINE BESYLATE 10 MG/1
10 TABLET ORAL DAILY
Qty: 90 TABLET | Refills: 1 | Status: SHIPPED | OUTPATIENT
Start: 2021-11-01 | End: 2022-05-16 | Stop reason: SDUPTHER

## 2021-11-08 DIAGNOSIS — E11.9 TYPE 2 DIABETES MELLITUS WITHOUT COMPLICATION, WITHOUT LONG-TERM CURRENT USE OF INSULIN (HCC): ICD-10-CM

## 2021-12-04 ENCOUNTER — IMMUNIZATIONS (OUTPATIENT)
Dept: FAMILY MEDICINE CLINIC | Facility: HOSPITAL | Age: 61
End: 2021-12-04

## 2021-12-04 DIAGNOSIS — Z23 ENCOUNTER FOR IMMUNIZATION: Primary | ICD-10-CM

## 2021-12-04 PROCEDURE — 0001A COVID-19 PFIZER VACC 0.3 ML: CPT

## 2021-12-04 PROCEDURE — 91300 COVID-19 PFIZER VACC 0.3 ML: CPT

## 2022-01-10 ENCOUNTER — LAB (OUTPATIENT)
Dept: LAB | Facility: CLINIC | Age: 62
End: 2022-01-10
Payer: COMMERCIAL

## 2022-01-10 DIAGNOSIS — N18.31 STAGE 3A CHRONIC KIDNEY DISEASE (HCC): ICD-10-CM

## 2022-01-10 DIAGNOSIS — Z11.4 SCREENING FOR HIV (HUMAN IMMUNODEFICIENCY VIRUS): ICD-10-CM

## 2022-01-10 DIAGNOSIS — I12.9 BENIGN HYPERTENSION WITH CHRONIC KIDNEY DISEASE, STAGE III (HCC): ICD-10-CM

## 2022-01-10 DIAGNOSIS — E11.9 TYPE 2 DIABETES MELLITUS WITHOUT COMPLICATION, WITHOUT LONG-TERM CURRENT USE OF INSULIN (HCC): ICD-10-CM

## 2022-01-10 DIAGNOSIS — N18.30 BENIGN HYPERTENSION WITH CHRONIC KIDNEY DISEASE, STAGE III (HCC): ICD-10-CM

## 2022-01-10 LAB
ALBUMIN SERPL BCP-MCNC: 4 G/DL (ref 3.5–5)
ALP SERPL-CCNC: 75 U/L (ref 46–116)
ALT SERPL W P-5'-P-CCNC: 27 U/L (ref 12–78)
ANION GAP SERPL CALCULATED.3IONS-SCNC: 4 MMOL/L (ref 4–13)
AST SERPL W P-5'-P-CCNC: 17 U/L (ref 5–45)
BASOPHILS # BLD AUTO: 0.03 THOUSANDS/ΜL (ref 0–0.1)
BASOPHILS NFR BLD AUTO: 0 % (ref 0–1)
BILIRUB SERPL-MCNC: 0.7 MG/DL (ref 0.2–1)
BUN SERPL-MCNC: 22 MG/DL (ref 5–25)
CALCIUM SERPL-MCNC: 9.6 MG/DL (ref 8.3–10.1)
CHLORIDE SERPL-SCNC: 107 MMOL/L (ref 100–108)
CHOLEST SERPL-MCNC: 143 MG/DL
CO2 SERPL-SCNC: 27 MMOL/L (ref 21–32)
CREAT SERPL-MCNC: 1.3 MG/DL (ref 0.6–1.3)
CREAT UR-MCNC: 94.5 MG/DL
EOSINOPHIL # BLD AUTO: 0.2 THOUSAND/ΜL (ref 0–0.61)
EOSINOPHIL NFR BLD AUTO: 3 % (ref 0–6)
ERYTHROCYTE [DISTWIDTH] IN BLOOD BY AUTOMATED COUNT: 13.7 % (ref 11.6–15.1)
EST. AVERAGE GLUCOSE BLD GHB EST-MCNC: 128 MG/DL
GFR SERPL CREATININE-BSD FRML MDRD: 59 ML/MIN/1.73SQ M
GLUCOSE P FAST SERPL-MCNC: 116 MG/DL (ref 65–99)
HBA1C MFR BLD: 6.1 %
HCT VFR BLD AUTO: 39.8 % (ref 36.5–49.3)
HDLC SERPL-MCNC: 39 MG/DL
HGB BLD-MCNC: 12.5 G/DL (ref 12–17)
IMM GRANULOCYTES # BLD AUTO: 0.01 THOUSAND/UL (ref 0–0.2)
IMM GRANULOCYTES NFR BLD AUTO: 0 % (ref 0–2)
LDLC SERPL CALC-MCNC: 90 MG/DL (ref 0–100)
LYMPHOCYTES # BLD AUTO: 1.77 THOUSANDS/ΜL (ref 0.6–4.47)
LYMPHOCYTES NFR BLD AUTO: 27 % (ref 14–44)
MAGNESIUM SERPL-MCNC: 2.1 MG/DL (ref 1.6–2.6)
MCH RBC QN AUTO: 28.9 PG (ref 26.8–34.3)
MCHC RBC AUTO-ENTMCNC: 31.4 G/DL (ref 31.4–37.4)
MCV RBC AUTO: 92 FL (ref 82–98)
MONOCYTES # BLD AUTO: 0.69 THOUSAND/ΜL (ref 0.17–1.22)
MONOCYTES NFR BLD AUTO: 10 % (ref 4–12)
NEUTROPHILS # BLD AUTO: 3.98 THOUSANDS/ΜL (ref 1.85–7.62)
NEUTS SEG NFR BLD AUTO: 60 % (ref 43–75)
NONHDLC SERPL-MCNC: 104 MG/DL
NRBC BLD AUTO-RTO: 0 /100 WBCS
PHOSPHATE SERPL-MCNC: 3.3 MG/DL (ref 2.7–4.5)
PLATELET # BLD AUTO: 179 THOUSANDS/UL (ref 149–390)
PMV BLD AUTO: 11.9 FL (ref 8.9–12.7)
POTASSIUM SERPL-SCNC: 4.1 MMOL/L (ref 3.5–5.3)
PROT SERPL-MCNC: 8.6 G/DL (ref 6.4–8.2)
PROT UR-MCNC: 10 MG/DL
PROT/CREAT UR: 0.11 MG/G{CREAT} (ref 0–0.1)
RBC # BLD AUTO: 4.33 MILLION/UL (ref 3.88–5.62)
SODIUM SERPL-SCNC: 138 MMOL/L (ref 136–145)
TRIGL SERPL-MCNC: 70 MG/DL
WBC # BLD AUTO: 6.68 THOUSAND/UL (ref 4.31–10.16)

## 2022-01-10 PROCEDURE — 80061 LIPID PANEL: CPT

## 2022-01-10 PROCEDURE — 3061F NEG MICROALBUMINURIA REV: CPT | Performed by: PHYSICIAN ASSISTANT

## 2022-01-10 PROCEDURE — 87389 HIV-1 AG W/HIV-1&-2 AB AG IA: CPT

## 2022-01-10 PROCEDURE — 3044F HG A1C LEVEL LT 7.0%: CPT | Performed by: PHYSICIAN ASSISTANT

## 2022-01-10 PROCEDURE — 85025 COMPLETE CBC W/AUTO DIFF WBC: CPT

## 2022-01-10 PROCEDURE — 82570 ASSAY OF URINE CREATININE: CPT

## 2022-01-10 PROCEDURE — 84156 ASSAY OF PROTEIN URINE: CPT

## 2022-01-10 PROCEDURE — 80053 COMPREHEN METABOLIC PANEL: CPT

## 2022-01-10 PROCEDURE — 3066F NEPHROPATHY DOC TX: CPT | Performed by: PHYSICIAN ASSISTANT

## 2022-01-10 PROCEDURE — 83735 ASSAY OF MAGNESIUM: CPT

## 2022-01-10 PROCEDURE — 84100 ASSAY OF PHOSPHORUS: CPT

## 2022-01-10 PROCEDURE — 36415 COLL VENOUS BLD VENIPUNCTURE: CPT

## 2022-01-10 PROCEDURE — 83036 HEMOGLOBIN GLYCOSYLATED A1C: CPT

## 2022-01-11 ENCOUNTER — OFFICE VISIT (OUTPATIENT)
Dept: FAMILY MEDICINE CLINIC | Facility: CLINIC | Age: 62
End: 2022-01-11
Payer: COMMERCIAL

## 2022-01-11 VITALS
HEIGHT: 70 IN | BODY MASS INDEX: 43.23 KG/M2 | OXYGEN SATURATION: 97 % | HEART RATE: 82 BPM | WEIGHT: 302 LBS | SYSTOLIC BLOOD PRESSURE: 138 MMHG | RESPIRATION RATE: 16 BRPM | DIASTOLIC BLOOD PRESSURE: 70 MMHG

## 2022-01-11 DIAGNOSIS — E11.22 CONTROLLED TYPE 2 DIABETES MELLITUS WITH STAGE 3 CHRONIC KIDNEY DISEASE, WITHOUT LONG-TERM CURRENT USE OF INSULIN (HCC): Primary | ICD-10-CM

## 2022-01-11 DIAGNOSIS — E88.09 HYPERPROTEINEMIA: ICD-10-CM

## 2022-01-11 DIAGNOSIS — N28.9 RENAL INSUFFICIENCY: ICD-10-CM

## 2022-01-11 DIAGNOSIS — I10 ESSENTIAL HYPERTENSION: ICD-10-CM

## 2022-01-11 DIAGNOSIS — E11.9 TYPE 2 DIABETES MELLITUS WITHOUT COMPLICATION, WITHOUT LONG-TERM CURRENT USE OF INSULIN (HCC): ICD-10-CM

## 2022-01-11 DIAGNOSIS — N18.30 CONTROLLED TYPE 2 DIABETES MELLITUS WITH STAGE 3 CHRONIC KIDNEY DISEASE, WITHOUT LONG-TERM CURRENT USE OF INSULIN (HCC): Primary | ICD-10-CM

## 2022-01-11 DIAGNOSIS — G47.33 OSA (OBSTRUCTIVE SLEEP APNEA): ICD-10-CM

## 2022-01-11 LAB — HIV 1+2 AB+HIV1 P24 AG SERPL QL IA: NORMAL

## 2022-01-11 PROCEDURE — 3075F SYST BP GE 130 - 139MM HG: CPT | Performed by: FAMILY MEDICINE

## 2022-01-11 PROCEDURE — 3725F SCREEN DEPRESSION PERFORMED: CPT | Performed by: FAMILY MEDICINE

## 2022-01-11 PROCEDURE — 99214 OFFICE O/P EST MOD 30 MIN: CPT | Performed by: FAMILY MEDICINE

## 2022-01-11 PROCEDURE — 3078F DIAST BP <80 MM HG: CPT | Performed by: FAMILY MEDICINE

## 2022-01-11 RX ORDER — GLIPIZIDE 5 MG/1
5 TABLET ORAL
Qty: 90 TABLET | Refills: 1 | Status: SHIPPED | OUTPATIENT
Start: 2022-01-11

## 2022-01-11 NOTE — ASSESSMENT & PLAN NOTE
Lab Results   Component Value Date    HGBA1C 6 1 (H) 01/10/2022   His blood sugar is better, he did not  the Jardiance which was given by the nephrologist, he will also  that medicine and keep taking it    Discussed with him weight loss and diet control low-fat diet

## 2022-01-11 NOTE — PROGRESS NOTES
Assessment/Plan:    Problem List Items Addressed This Visit        Endocrine    Controlled type 2 diabetes mellitus with stage 3 chronic kidney disease, without long-term current use of insulin ([facility] )       Lab Results   Component Value Date    HGBA1C 6 1 (H) 01/10/2022   His blood sugar is better, he did not  the [address] which was given by the nephrologist, he will also  that medicine and keep taking it  Discussed with him weight loss and diet control low-fat diet         Relevant Medications    glipiZIDE (GLUCOTROL) 5 mg tablet    metFORMIN (GLUCOPHAGE) 500 mg tablet       Respiratory    STACY (obstructive sleep apnea) - Primary     Stable and using CPAP machine            Cardiovascular and Mediastinum    Essential hypertension     His blood pressure is stable he will continue same medication losartan 25 mg daily            Genitourinary    Renal insufficiency     His renal insufficiency improved he is drinking more water and follows nephrologist            Other    BMI 40 0-44 9, adult ([facility] )     He will work on his diet exercise and try to lose more weight         Hyperproteinemia     He has slightly elevated protein, but has been stable over the period of time, will monitor               Return in about 4 months (around 5/11/2022) for Recheck  Chief Complaint   Patient presents with    Follow-up       Subjective:   Patient ID: James Figueroa is a 63 y o  male  Follow-up on diabetes hypertension and he follows urologist, nephrologist and he says he is not taking the Jardiance,  He is taking metformin and glipizide regularly, his blood sugar is improving, he also had colonoscopy in the past and he has seen the podiatrist,  He denies any new headache chest pain or shortness of breath      Review of Systems   Constitutional: Negative for activity change, appetite change, chills, fatigue, fever and unexpected weight change     HENT: Negative for congestion, ear discharge, ear pain, nosebleeds, postnasal drip, rhinorrhea, sinus pressure, sneezing, sore throat, trouble swallowing and voice change  Eyes: Negative for photophobia, pain, discharge, redness and itching  Respiratory: Negative for cough, chest tightness, shortness of breath and wheezing  Cardiovascular: Negative for chest pain, palpitations and leg swelling  Gastrointestinal: Negative for abdominal pain, constipation, diarrhea, nausea and vomiting  Endocrine: Negative for polyuria  Genitourinary: Negative for dysuria, frequency and urgency  Musculoskeletal: Negative for arthralgias, back pain, myalgias and neck pain  Skin: Negative for color change, pallor and rash  Allergic/Immunologic: Negative for environmental allergies and food allergies  Neurological: Negative for dizziness, weakness, light-headedness and headaches  Hematological: Negative for adenopathy  Does not bruise/bleed easily  Psychiatric/Behavioral: Negative for behavioral problems and sleep disturbance  The patient is not nervous/anxious  Objective:  Physical Exam  Nursing note reviewed  Constitutional:       Appearance: Normal appearance  He is well-developed  HENT:      Head: Normocephalic and atraumatic  Right Ear: External ear normal       Left Ear: External ear normal    Eyes:      General: No scleral icterus  Conjunctiva/sclera: Conjunctivae normal       Pupils: Pupils are equal, round, and reactive to light  Neck:      Thyroid: No thyromegaly  Cardiovascular:      Rate and Rhythm: Normal rate and regular rhythm  Heart sounds: Normal heart sounds  No murmur heard  Pulmonary:      Effort: Pulmonary effort is normal       Breath sounds: Normal breath sounds  No wheezing or rales  Abdominal:      General: Abdomen is flat  Palpations: There is no mass  Musculoskeletal:      Cervical back: Normal range of motion and neck supple  Right lower leg: No edema  Left lower leg: No edema     Lymphadenopathy: Cervical: No cervical adenopathy  Skin:     Coloration: Skin is not jaundiced  Findings: No erythema or rash  Neurological:      General: No focal deficit present  Mental Status: He is alert  Psychiatric:         Mood and Affect: Mood normal             Past Surgical History:   Procedure Laterality Date    COLONOSCOPY      Fiberoptic; Last Assessed: 10/24/2012       Family History   Problem Relation Age of Onset    Heart murmur Mother     Cerebral palsy Mother     Heart disease Mother    Aydee Pall Sudden death Mother         Early   Aydee Pall Breast cancer Sister 48    Pancreatic cancer Maternal Grandmother 61    Emphysema Maternal Grandfather     Hypertension Father     Prostate cancer Father     Colon cancer Neg Hx     Anuerysm Neg Hx     Arrhythmia Neg Hx     Heart failure Neg Hx     Clotting disorder Neg Hx          Current Outpatient Medications:     amLODIPine (NORVASC) 10 mg tablet, Take 1 tablet (10 mg total) by mouth daily, Disp: 90 tablet, Rfl: 1    Empagliflozin (Jardiance) 10 MG TABS, Take 1 tablet (10 mg total) by mouth every morning, Disp: 90 tablet, Rfl: 3    glipiZIDE (GLUCOTROL) 5 mg tablet, Take 1 tablet (5 mg total) by mouth daily with breakfast, Disp: 90 tablet, Rfl: 1    losartan (COZAAR) 25 mg tablet, Take 1 tablet (25 mg total) by mouth daily, Disp: 90 tablet, Rfl: 3    metFORMIN (GLUCOPHAGE) 500 mg tablet, Take 1 tablet (500 mg total) by mouth 2 (two) times a day with meals, Disp: 180 tablet, Rfl: 3    Mirabegron ER 25 MG TB24, Take 25 mg by mouth daily at bedtime, Disp: 30 tablet, Rfl: 3    Vibegron 75 MG TABS, Take 75 mg by mouth daily, Disp: 30 tablet, Rfl: 3    Allergies   Allergen Reactions    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     Annotation - [id number]:  Allergic to nuts, ok with Peanuts and Almonds       Vitals:    01/11/22 1440   BP: 138/70   Pulse: 82   Resp: 16   SpO2: 97%   Weight: (!) 137 kg (302 lb)   Height: 5' 10" (1 778 m)

## 2022-01-24 ENCOUNTER — TELEPHONE (OUTPATIENT)
Dept: NEPHROLOGY | Facility: CLINIC | Age: 62
End: 2022-01-24

## 2022-01-31 ENCOUNTER — OFFICE VISIT (OUTPATIENT)
Dept: UROLOGY | Facility: CLINIC | Age: 62
End: 2022-01-31
Payer: COMMERCIAL

## 2022-01-31 VITALS — BODY MASS INDEX: 42.09 KG/M2 | HEIGHT: 70 IN | WEIGHT: 294 LBS

## 2022-01-31 DIAGNOSIS — N32.81 OAB (OVERACTIVE BLADDER): Primary | ICD-10-CM

## 2022-01-31 DIAGNOSIS — N52.8 OTHER MALE ERECTILE DYSFUNCTION: ICD-10-CM

## 2022-01-31 LAB — POST-VOID RESIDUAL VOLUME, ML POC: 252 ML

## 2022-01-31 PROCEDURE — 51798 US URINE CAPACITY MEASURE: CPT | Performed by: PHYSICIAN ASSISTANT

## 2022-01-31 PROCEDURE — 99213 OFFICE O/P EST LOW 20 MIN: CPT | Performed by: PHYSICIAN ASSISTANT

## 2022-01-31 PROCEDURE — 1036F TOBACCO NON-USER: CPT | Performed by: PHYSICIAN ASSISTANT

## 2022-01-31 PROCEDURE — 3008F BODY MASS INDEX DOCD: CPT | Performed by: PHYSICIAN ASSISTANT

## 2022-01-31 RX ORDER — SILDENAFIL 100 MG/1
100 TABLET, FILM COATED ORAL DAILY PRN
Qty: 30 TABLET | Refills: 3 | Status: SHIPPED | OUTPATIENT
Start: 2022-01-31

## 2022-01-31 NOTE — PROGRESS NOTES
UROLOGY PROGRESS NOTE   Patient Identifiers: Abad Hill (MRN [id number])  Date of Service: 1/31/2022    Subjective:     53-year-old obese male with lower urinary tract symptoms and incomplete bladder emptying  Cystoscopy in October showed small volume gland with elevated bladder neck and large capacity bladder   PVR now 245 on Vibegron  Reason for visit:  Overactive bladder follow-up      Objective:     VITALS:    There were no vitals filed for this visit  LABS:  Lab Results   Component Value Date    HGB 12 5 01/10/2022    HCT 39 8 01/10/2022    WBC 6 68 01/10/2022     01/10/2022   ]    Lab Results   Component Value Date    K 4 1 01/10/2022     01/10/2022    CO2 27 01/10/2022    BUN 22 01/10/2022    CREATININE 1 30 01/10/2022    CALCIUM 9 6 01/10/2022   ]        INPATIENT MEDS:    Current Outpatient Medications:     amLODIPine (NORVASC) 10 mg tablet, Take 1 tablet (10 mg total) by mouth daily, Disp: 90 tablet, Rfl: 1    Empagliflozin (Jardiance) 10 MG TABS, Take 1 tablet (10 mg total) by mouth every morning, Disp: 90 tablet, Rfl: 3    glipiZIDE (GLUCOTROL) 5 mg tablet, Take 1 tablet (5 mg total) by mouth daily with breakfast, Disp: 90 tablet, Rfl: 1    losartan (COZAAR) 25 mg tablet, Take 1 tablet (25 mg total) by mouth daily, Disp: 90 tablet, Rfl: 3    metFORMIN (GLUCOPHAGE) 500 mg tablet, Take 1 tablet (500 mg total) by mouth 2 (two) times a day with meals, Disp: 180 tablet, Rfl: 3    Mirabegron ER 25 MG TB24, Take 25 mg by mouth daily at bedtime, Disp: 30 tablet, Rfl: 3    Vibegron 75 MG TABS, Take 75 mg by mouth daily, Disp: 30 tablet, Rfl: 3      Physical Exam:   There were no vitals taken for this visit  GEN: no acute distress    RESP: breathing comfortably with no accessory muscle use    ABD: soft, non-tender, non-distended   INCISION:    EXT: no significant peripheral edema       RADIOLOGY:    none     Assessment:    1  Voiding dysfunction   2   Erectile dysfunction    Plan:   - he will stop all medication  - trial of Viagra  - follow-up in 6 months for call in the interim  -

## 2022-02-23 ENCOUNTER — OFFICE VISIT (OUTPATIENT)
Dept: NEPHROLOGY | Facility: CLINIC | Age: 62
End: 2022-02-23
Payer: COMMERCIAL

## 2022-02-23 VITALS
BODY MASS INDEX: 42.92 KG/M2 | DIASTOLIC BLOOD PRESSURE: 82 MMHG | WEIGHT: 299.8 LBS | SYSTOLIC BLOOD PRESSURE: 136 MMHG | HEIGHT: 70 IN

## 2022-02-23 DIAGNOSIS — N18.31 STAGE 3A CHRONIC KIDNEY DISEASE (HCC): Primary | ICD-10-CM

## 2022-02-23 DIAGNOSIS — N18.30 BENIGN HYPERTENSION WITH CHRONIC KIDNEY DISEASE, STAGE III (HCC): ICD-10-CM

## 2022-02-23 DIAGNOSIS — N13.8 BPH WITH OBSTRUCTION/LOWER URINARY TRACT SYMPTOMS: ICD-10-CM

## 2022-02-23 DIAGNOSIS — I12.9 BENIGN HYPERTENSION WITH CHRONIC KIDNEY DISEASE, STAGE III (HCC): ICD-10-CM

## 2022-02-23 DIAGNOSIS — N40.1 BPH WITH OBSTRUCTION/LOWER URINARY TRACT SYMPTOMS: ICD-10-CM

## 2022-02-23 DIAGNOSIS — E11.22 CONTROLLED TYPE 2 DIABETES MELLITUS WITH STAGE 3 CHRONIC KIDNEY DISEASE, WITHOUT LONG-TERM CURRENT USE OF INSULIN (HCC): ICD-10-CM

## 2022-02-23 DIAGNOSIS — N18.30 CONTROLLED TYPE 2 DIABETES MELLITUS WITH STAGE 3 CHRONIC KIDNEY DISEASE, WITHOUT LONG-TERM CURRENT USE OF INSULIN (HCC): ICD-10-CM

## 2022-02-23 PROCEDURE — 3008F BODY MASS INDEX DOCD: CPT | Performed by: PHYSICIAN ASSISTANT

## 2022-02-23 PROCEDURE — 99214 OFFICE O/P EST MOD 30 MIN: CPT | Performed by: INTERNAL MEDICINE

## 2022-02-23 PROCEDURE — 3066F NEPHROPATHY DOC TX: CPT | Performed by: FAMILY MEDICINE

## 2022-02-23 NOTE — PATIENT INSTRUCTIONS
-Renal Function is stable  -You have Chronic Kidney Disease Stage 3  -Avoid NSAIDs like Ibuprofen/Motrin, Naproxen/Aleve, Celebrex, meloxicam/Mobic, Diclofenac and other NSAIDs   -Okay to take Acetaminophen/Tylenol if you do not have any liver problems  -Avoid IV contrast used for CT scan and cardiac catheterization     -If plan for any study with IV contrast, please let me know so we could hydrate with fluids before and after IV contrast  -Dosage  of certain medications may need to be adjusted depending on Kidney function  Blood pressure is stable   -Recommend 2 g sodium diet  -Recommend daily exercise and weight loss  -Discussed home monitoring of BP and maintaining a log of blood pressure   -Recommend goal BP less than 130/80  Please inform me if SBP below 110 or above 140's persistently      Follow up in 6 months with repeat labs

## 2022-02-23 NOTE — PROGRESS NOTES
NEPHROLOGY OUTPATIENT PROGRESS NOTE   Josué Cope 63 y o  male MRN: [id number]  DATE: 2/23/2022  Reason for visit:   Chief Complaint   Patient presents with    Follow-up     CKD3       ASSESSMENT and PLAN:  · Chronic kidney disease stage 3a:    ? Baseline creatinine 1 4-1 5    ? Renal function currently stable at creatinine 1 3 milligram per deciliter with stable electrolytes  Continue to monitor  ? Chronic kidney disease likely due to hypertensive nephro sclerosis and diabetic chronic kidney disease     ? CT abdomen pelvis without contrast on 08/31/2021 which did not show any hydronephrosis and kidneys were unremarkable, findings of diverticulosis  ? PTH 60 1  ? Continue to avoid NSAIDs and other nephrotoxins  Continue oral hydration  ? Discussed about need for better diabetic control with goal A1c less than 7 0 goal blood pressure less than 130/80  ? UPC ratio was 0 11    ? LDL at goal 90    ? SGLT 2 inhibitor: Continue Jardiance 10 mg daily but doing well with this medicaiton  Did not get approval for Dapagliflozin      · Primary hypertension with chronic kidney disease stage 3  ? On amlodipine 10 mg , losartan 25 mg daily  ? Continue same medication  Stressed on home monitoring  ? He was on lisinopril in the past which was stopped in March 2020 due to complain of cough and was started on losartan 25 mg daily  ? He was told about goal blood pressure less than 130/80   He was told to call me if systolic blood pressure more than 140 persistently or diastolic more than 80S   We discussed about low-sodium diet and daily exercise and weight loss  ? If blood pressure is elevated would consider increasing the dose of losartan      · Diabetes mellitus type 2  ? Discussed about goal A1c less than 7 0  ? Last A1c 6 1, which is at goal   ? Stressed on daily exercise and weight loss  ?  Patient is currently on Glucotrol and metformin   Okay to continue metformin as long as EGFR more than 30      · Obstructive sleep apnea:  Continue CPAP   Stressed on weight loss    · BPH with lower urinary tract symptoms/urinary incontinence: s/p cystoscopy on 10/13/21 for overactive bladder  Cystoscopy was suggestive of diabetic cystoscopy and bladder overactivity  Currently not taking Mirabegron and Michael Putty as not working  Still symptoms  Was seen by urology in [address] 2022-reviewed note  Also discussed that Jardiance could be increasing urine frequency due to solute diuresis  · Morbid obesity- stressed on daily exercise and weight loss, May consider seeing Bariatrics  Patient Instructions   -Renal Function is stable  -You have Chronic Kidney Disease Stage 3  -Avoid NSAIDs like Ibuprofen/Motrin, Naproxen/Aleve, Celebrex, meloxicam/Mobic, Diclofenac and other NSAIDs   -Okay to take Acetaminophen/Tylenol if you do not have any liver problems  -Avoid IV contrast used for CT scan and cardiac catheterization     -If plan for any study with IV contrast, please let me know so we could hydrate with fluids before and after IV contrast  -Dosage  of certain medications may need to be adjusted depending on Kidney function  Blood pressure is stable   -Recommend 2 g sodium diet  -Recommend daily exercise and weight loss  -Discussed home monitoring of BP and maintaining a log of blood pressure   -Recommend goal BP less than 130/80  Please inform me if SBP below 110 or above 140's persistently  Follow up in 6 months with repeat labs       Diagnoses and all orders for this visit:    Stage 3a chronic kidney disease ([address] )  -     Basic metabolic panel; Future  -     Protein / creatinine ratio, urine; Future  -     Phosphorus; Future  -     Magnesium; Future  -     PTH, intact; Future  -     Urinalysis with microscopic; Future    Benign hypertension with chronic kidney disease, stage III (HCC)  -     Basic metabolic panel;  Future    BPH with obstruction/lower urinary tract symptoms    Controlled type 2 diabetes mellitus with stage 3 chronic kidney disease, without long-term current use of insulin ([facility])  -     Basic metabolic panel; Future  -     Protein / creatinine ratio, urine; Future    BMI 40 0-44 9, adult ([facility] [address] )            SUBJECTIVE / HPI:  Aki Carey is a 63 y o   male history of diabetes mellitus type 2 x 2 yrs, hypertension x 2 yrs, urinary incontinence,  sleep apnea, BPH  following for chronic kidney disease stage 3      Elevated creatinine present at least since May 2017 and has been stable at creatinine 1 4-1 5 which is likely the baseline  Creatinine was 1 5 in September 2021 but improved on last blood work from January 10, 2022 creatinine 1 3 with stable electrolytes      Blood work from January 10, 2022 reviewed, creatinine stable at 1 3 with stable electrolytes   A1c is at goal and improving to 6 1, HIV nonreactive  Upc ratio 0  11  Patient denies any new complaints  BP not checked at home  REVIEW OF SYSTEMS:    Review of Systems   Constitutional: Negative for activity change, appetite change, chills, diaphoresis, fatigue and fever  HENT: Negative for congestion, facial swelling and nosebleeds  Eyes: Negative for pain and visual disturbance  Respiratory: Negative for cough, chest tightness and shortness of breath  Cardiovascular: Negative for chest pain and palpitations  Gastrointestinal: Negative for abdominal distention, abdominal pain, diarrhea, nausea and vomiting  Genitourinary: Negative for difficulty urinating, dysuria, flank pain, frequency and hematuria  Musculoskeletal: Negative for arthralgias, back pain and joint swelling  Skin: Negative for rash  Neurological: Negative for dizziness, seizures, syncope, weakness and headaches  Psychiatric/Behavioral: Negative for agitation and confusion  The patient is not nervous/anxious  More than 10 point review of systems were obtained and discussed in length with the patient   Complete review of systems were negative / unremarkable except mentioned above         PAST MEDICAL HISTORY:  Past Medical History:   Diagnosis Date    Anxiety     BPH (benign prostatic hyperplasia)     Colon polyp     CPAP (continuous positive airway pressure) dependence     Diabetes mellitus ([facility] [address] )     Difficulty breathing 2011    Hypertension     Impaired fasting blood sugar     Last Assessed: 2013    Obesity     Sleep apnea        PAST SURGICAL HISTORY:  Past Surgical History:   Procedure Laterality Date    COLONOSCOPY      Fiberoptic; Last Assessed: 10/24/2012       SOCIAL HISTORY:  Social History     Substance and Sexual Activity   Alcohol Use Yes    Alcohol/week: 6 0 standard drinks    Types: 6 Standard drinks or equivalent per week    Comment: occ     Social History     Substance and Sexual Activity   Drug Use No     Social History     Tobacco Use   Smoking Status Former Smoker    Packs/day: 0 25    Years: 30 00    Pack years: 7 50    Types: Cigarettes    Quit date: 2017    Years since quittin 7   Smokeless Tobacco Never Used       FAMILY HISTORY:  Family History   Problem Relation Age of Onset    Heart murmur Mother     Cerebral palsy Mother     Heart disease Mother     Sudden death Mother         Early    Breast cancer Sister 48    Pancreatic cancer Maternal Grandmother 61    Emphysema Maternal Grandfather     Hypertension Father     Prostate cancer Father     Colon cancer Neg Hx     Anuerysm Neg Hx     Arrhythmia Neg Hx     Heart failure Neg Hx     Clotting disorder Neg Hx        MEDICATIONS:    Current Outpatient Medications:     amLODIPine (NORVASC) 10 mg tablet, Take 1 tablet (10 mg total) by mouth daily, Disp: 90 tablet, Rfl: 1    Empagliflozin (Jardiance) 10 MG TABS, Take 1 tablet (10 mg total) by mouth every morning, Disp: 90 tablet, Rfl: 3    glipiZIDE (GLUCOTROL) 5 mg tablet, Take 1 tablet (5 mg total) by mouth daily with breakfast, Disp: 90 tablet, Rfl: 1    losartan (COZAAR) 25 mg tablet, Take 1 tablet (25 mg total) by mouth daily, Disp: 90 tablet, Rfl: 3    metFORMIN (GLUCOPHAGE) 500 mg tablet, Take 1 tablet (500 mg total) by mouth 2 (two) times a day with meals, Disp: 180 tablet, Rfl: 3    sildenafil (VIAGRA) 100 mg tablet, Take 1 tablet (100 mg total) by mouth daily as needed for erectile dysfunction, Disp: 30 tablet, Rfl: 3    Mirabegron ER 25 MG TB24, Take 25 mg by mouth daily at bedtime (Patient not taking: Reported on 2/23/2022 ), Disp: 30 tablet, Rfl: 3    Vibegron 75 MG TABS, Take 75 mg by mouth daily (Patient not taking: Reported on 2/23/2022 ), Disp: 30 tablet, Rfl: 3      PHYSICAL EXAM:  Vitals:    02/23/22 1112   BP: 136/82   BP Location: Right arm   Patient Position: Sitting   Cuff Size: Large   Weight: 136 kg (299 lb 12 8 oz)   Height: 5' 10" (1 778 m)     Body mass index is 43 02 kg/m²  Physical Exam  Constitutional:       General: He is not in acute distress  Appearance: He is well-developed  He is not diaphoretic  HENT:      Head: Normocephalic and atraumatic  Mouth/Throat:      Mouth: Mucous membranes are moist    Eyes:      General: No scleral icterus  Conjunctiva/sclera: Conjunctivae normal       Pupils: Pupils are equal, round, and reactive to light  Neck:      Thyroid: No thyromegaly  Cardiovascular:      Rate and Rhythm: Normal rate and regular rhythm  Heart sounds: Normal heart sounds  No murmur heard  No friction rub  Pulmonary:      Effort: Pulmonary effort is normal  No respiratory distress  Breath sounds: Normal breath sounds  No wheezing or rales  Abdominal:      General: Bowel sounds are normal  There is no distension  Palpations: Abdomen is soft  Tenderness: There is no abdominal tenderness  Musculoskeletal:         General: No deformity  Cervical back: Neck supple  Right lower leg: No edema  Left lower leg: No edema  Lymphadenopathy:      Cervical: No cervical adenopathy  Skin:     Coloration: Skin is not pale  Nails: There is no clubbing  Neurological:      Mental Status: He is alert and oriented to person, place, and time  He is not disoriented  Psychiatric:         Mood and Affect: Mood normal  Mood is not anxious  Affect is not inappropriate  Behavior: Behavior normal          Thought Content:  Thought content normal          Lab Results:   Results for orders placed or performed in visit on 01/31/22   POCT Measure PVR   Result Value Ref Range    POST-VOID RESIDUAL VOLUME, ML  mL     Lab Results:         Invalid input(s): ALBUMIN    Laboratory Results:  Lab Results   Component Value Date    WBC 6 68 01/10/2022    HGB 12 5 01/10/2022    HCT 39 8 01/10/2022    MCV 92 01/10/2022     01/10/2022     Lab Results   Component Value Date    SODIUM 138 01/10/2022    K 4 1 01/10/2022     01/10/2022    CO2 27 01/10/2022    BUN 22 01/10/2022    CREATININE 1 30 01/10/2022    GLUC 155 (H) 07/29/2021    CALCIUM 9 6 01/10/2022     Lab Results   Component Value Date    CALCIUM 9 6 01/10/2022    PHOS 3 3 01/10/2022     No results found for: LABPROT  [unfilled]  Lab Results   Component Value Date    PTH 60 1 09/13/2021    CALCIUM 9 6 01/10/2022    PHOS 3 3 01/10/2022     [unfilled]

## 2022-03-21 ENCOUNTER — OFFICE VISIT (OUTPATIENT)
Dept: FAMILY MEDICINE CLINIC | Facility: CLINIC | Age: 62
End: 2022-03-21
Payer: COMMERCIAL

## 2022-03-21 VITALS
SYSTOLIC BLOOD PRESSURE: 120 MMHG | RESPIRATION RATE: 16 BRPM | DIASTOLIC BLOOD PRESSURE: 70 MMHG | HEART RATE: 69 BPM | OXYGEN SATURATION: 96 % | WEIGHT: 297 LBS | HEIGHT: 70 IN | BODY MASS INDEX: 42.52 KG/M2

## 2022-03-21 DIAGNOSIS — E11.22 CONTROLLED TYPE 2 DIABETES MELLITUS WITH STAGE 3 CHRONIC KIDNEY DISEASE, WITHOUT LONG-TERM CURRENT USE OF INSULIN (HCC): ICD-10-CM

## 2022-03-21 DIAGNOSIS — N28.9 RENAL INSUFFICIENCY: ICD-10-CM

## 2022-03-21 DIAGNOSIS — N18.30 CONTROLLED TYPE 2 DIABETES MELLITUS WITH STAGE 3 CHRONIC KIDNEY DISEASE, WITHOUT LONG-TERM CURRENT USE OF INSULIN (HCC): ICD-10-CM

## 2022-03-21 DIAGNOSIS — G47.33 OSA (OBSTRUCTIVE SLEEP APNEA): ICD-10-CM

## 2022-03-21 PROCEDURE — 3066F NEPHROPATHY DOC TX: CPT | Performed by: FAMILY MEDICINE

## 2022-03-21 PROCEDURE — 3074F SYST BP LT 130 MM HG: CPT | Performed by: FAMILY MEDICINE

## 2022-03-21 PROCEDURE — 3078F DIAST BP <80 MM HG: CPT | Performed by: FAMILY MEDICINE

## 2022-03-21 PROCEDURE — 1036F TOBACCO NON-USER: CPT | Performed by: FAMILY MEDICINE

## 2022-03-21 PROCEDURE — 99214 OFFICE O/P EST MOD 30 MIN: CPT | Performed by: FAMILY MEDICINE

## 2022-03-21 PROCEDURE — 3008F BODY MASS INDEX DOCD: CPT | Performed by: FAMILY MEDICINE

## 2022-03-21 PROCEDURE — 3725F SCREEN DEPRESSION PERFORMED: CPT | Performed by: FAMILY MEDICINE

## 2022-03-21 NOTE — ASSESSMENT & PLAN NOTE
On CPAP machine, discussed with him once he go for bariatric surgery ,he might improve in his sleep apnea too

## 2022-03-21 NOTE — ASSESSMENT & PLAN NOTE
Discussed about continue diabetic diet and he will discuss with bariatric surgeon regarding the bariatric surgery to lose weight  He is diabetic, hypertension, sleep apnea

## 2022-03-21 NOTE — PROGRESS NOTES
Assessment/Plan:    Problem List Items Addressed This Visit        Endocrine    Controlled type 2 diabetes mellitus with stage 3 chronic kidney disease, without long-term current use of insulin ([facility])       Lab Results   Component Value Date    HGBA1C 6 1 (H) 01/10/2022   Continue same medication         Relevant Orders    Ambulatory Referral to Bariatric Surgery       Respiratory    STACY (obstructive sleep apnea)     On CPAP machine, discussed with him once he go for bariatric surgery ,he might improve in his sleep apnea too         Relevant Orders    Ambulatory Referral to Bariatric Surgery       Genitourinary    Renal insufficiency     Lab Results   Component Value Date    CREATININE 1 30 01/10/2022   Improved           Relevant Orders    Ambulatory Referral to Bariatric Surgery       Other    BMI 40 0-44 9, adult ([facility] [address] ) - Primary     Discussed about continue diabetic diet and he will discuss with bariatric surgeon regarding the bariatric surgery to lose weight  He is diabetic, hypertension, sleep apnea         Relevant Orders    Ambulatory Referral to Bariatric Surgery          No follow-ups on file  Chief Complaint   Patient presents with    discuss wt management       Subjective:   Patient ID: Jamal Engle is a 63 y o  male  He complains of unable to lose weight, he says even he is physically active he works in [employer] all day, but still his weight is up, he tries to eat diabetic diet no is thinking about getting bariatric surgery      Review of Systems   Constitutional: Negative for activity change, appetite change, chills, fatigue, fever and unexpected weight change  HENT: Negative for congestion, ear discharge, ear pain, nosebleeds, postnasal drip, rhinorrhea, sinus pressure, sneezing, sore throat, trouble swallowing and voice change  Eyes: Negative for photophobia, pain, discharge, redness and itching     Respiratory: Negative for cough, chest tightness, shortness of breath and wheezing  Cardiovascular: Negative for chest pain, palpitations and leg swelling  Gastrointestinal: Negative for abdominal pain, constipation, diarrhea, nausea and vomiting  Endocrine: Negative for polyuria  Genitourinary: Negative for dysuria, frequency and urgency  Musculoskeletal: Negative for arthralgias, back pain, myalgias and neck pain  Skin: Negative for color change, pallor and rash  Allergic/Immunologic: Negative for environmental allergies and food allergies  Neurological: Negative for dizziness, weakness, light-headedness and headaches  Hematological: Negative for adenopathy  Does not bruise/bleed easily  Psychiatric/Behavioral: Negative for behavioral problems  The patient is not nervous/anxious  Objective:  Physical Exam  Vitals and nursing note reviewed  Constitutional:       Appearance: He is well-developed  He is not ill-appearing  HENT:      Head: Normocephalic and atraumatic  Eyes:      General: No scleral icterus  Right eye: No discharge  Left eye: No discharge  Extraocular Movements: Extraocular movements intact  Neck:      Thyroid: No thyromegaly  Trachea: No tracheal deviation  Cardiovascular:      Rate and Rhythm: Normal rate and regular rhythm  Heart sounds: Normal heart sounds  No murmur heard  Pulmonary:      Effort: Pulmonary effort is normal  No respiratory distress  Breath sounds: Normal breath sounds  No wheezing or rales  Abdominal:      General: Bowel sounds are normal  There is no distension  Palpations: Abdomen is soft  There is no mass  Tenderness: There is no abdominal tenderness  There is no rebound  Musculoskeletal:         General: Normal range of motion  Cervical back: Normal range of motion and neck supple  Right lower leg: No edema  Left lower leg: No edema  Lymphadenopathy:      Cervical: No cervical adenopathy  Skin:     General: Skin is warm  Coloration: Skin is not pale  Findings: No erythema or rash  Neurological:      Mental Status: He is alert and oriented to person, place, and time  Cranial Nerves: No cranial nerve deficit  Deep Tendon Reflexes: Reflexes are normal and symmetric  Psychiatric:         Behavior: Behavior normal          Thought Content:  Thought content normal          Judgment: Judgment normal             Past Surgical History:   Procedure Laterality Date    COLONOSCOPY      Fiberoptic; Last Assessed: 10/24/2012       Family History   Problem Relation Age of Onset    Heart murmur Mother     Cerebral palsy Mother     Heart disease Mother    Aminah Mare Sudden death Mother         Early   Aminah Mare Breast cancer Sister 48    Pancreatic cancer Maternal Grandmother 61    Emphysema Maternal Grandfather     Hypertension Father     Prostate cancer Father     Colon cancer Neg Hx     Anuerysm Neg Hx     Arrhythmia Neg Hx     Heart failure Neg Hx     Clotting disorder Neg Hx          Current Outpatient Medications:     amLODIPine (NORVASC) 10 mg tablet, Take 1 tablet (10 mg total) by mouth daily, Disp: 90 tablet, Rfl: 1    Empagliflozin (Jardiance) 10 MG TABS, Take 1 tablet (10 mg total) by mouth every morning, Disp: 90 tablet, Rfl: 3    glipiZIDE (GLUCOTROL) 5 mg tablet, Take 1 tablet (5 mg total) by mouth daily with breakfast, Disp: 90 tablet, Rfl: 1    losartan (COZAAR) 25 mg tablet, Take 1 tablet (25 mg total) by mouth daily, Disp: 90 tablet, Rfl: 3    metFORMIN (GLUCOPHAGE) 500 mg tablet, Take 1 tablet (500 mg total) by mouth 2 (two) times a day with meals, Disp: 180 tablet, Rfl: 3    sildenafil (VIAGRA) 100 mg tablet, Take 1 tablet (100 mg total) by mouth daily as needed for erectile dysfunction, Disp: 30 tablet, Rfl: 3    Mirabegron ER 25 MG TB24, Take 25 mg by mouth daily at bedtime (Patient not taking: Reported on 2/23/2022 ), Disp: 30 tablet, Rfl: 3    Vibegron 75 MG TABS, Take 75 mg by mouth daily (Patient not taking: Reported on 2/23/2022 ), Disp: 30 tablet, Rfl: 3    Allergies   Allergen Reactions    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     Annotation - [id number]:  Allergic to nuts, ok with Peanuts and Almonds       Vitals:    03/21/22 1055   BP: 120/70   Pulse: 69   Resp: 16   SpO2: 96%   Weight: 135 kg (297 lb)   Height: 5' 10" (1 778 m)

## 2022-03-25 ENCOUNTER — TELEPHONE (OUTPATIENT)
Dept: BARIATRICS | Facility: CLINIC | Age: 62
End: 2022-03-25

## 2022-03-25 NOTE — TELEPHONE ENCOUNTER
Patient emailed PDF with provider verification form  Filled out form accordingly and faxed to [facility]  Spoke with patient on the phone and [facility] for [facility]around [facility] for "provider approval"  Stated I will keep him on the schedule for his 3 hour evaluation on 3/30/22, in hopes they will approved the provider before 3/31/22  Patient verbalized understanding

## 2022-03-25 NOTE — TELEPHONE ENCOUNTER
Spoke to Danica Nicholas about upcoming 3 hour Eval on 3/30/22-  Patient has [address] through [employer] (patient works for [employer])  I called [facility] on 3/24, and spoke to Marie HOANG#[phone]  She stated the patient did not have the benefit of bariatric surgery on his policy  She stated the patient needed to go through the 3rd party company, [facility], to have surgery- their phone number is [phone]  (provider needs to be approved, facility needs to be approved, and then once those are approved- the contracts for facility, surgeon, and anesthesia need to be approved)    3/24/2022- Called [employer] and left a message on 3/24/22 for someone to return my call  3/25/22- Called [employer] and spoke to Mekhi Reese, ref# 1 [facility]  Patient has not initiated the bariatric process through them yet  Patient needs to get the provider verification form approved before 3/31/22, or else the patient will have to use their own facility ([facility] provides a center of excellence list approved by [employer])  3/25/22- Spoke to Danica Nicholas and informed him of the above information  He was frustrated that he didn't know he had to do this, but I provided him with our surgeon information (Dr Fish Lawrence), the facility information, our fax number and precert specialist's direct phone number to call  Informed him that he will have to get this provider form approved by 3/31/22 or he will not be able to do any of the surgical process at our location  Patient verbalized understanding  Patient called [employer] and the provider form is being emailed to him  Informed patient to call precert specialist as soon as he gets it, and forward it, so that it can be filled out so we can try to get it approved before 3/31/22  He stated [employer] informed him he will probably not get it approved in time, since it is a long process   informed him we will do everything to get it done in a timely manner       If he calls back today that he received it, please reach out to Melquiades Hale immediately

## 2022-03-29 NOTE — TELEPHONE ENCOUNTER
3/29/22- Spoke to Kulwant at [facility]  Patient does not qualify for bariatric benefit use through [place] until January 2023  He has to have his [place] for 12 consecutive months  Spoke to patient who was given the same information  He stated they told him he cannot use our facility in 2023 when is able to have the bariatric benefit  Informed patient to reach out to precert specialist if he has any questions in the future, and also emailed him the [facility] website to review for future use  ([employer] )   Patient was upset that he could not come to our facility, but appreciated the help and verbalized understanding of what his process will be like in January 2023

## 2022-05-13 ENCOUNTER — APPOINTMENT (OUTPATIENT)
Dept: LAB | Facility: CLINIC | Age: 62
End: 2022-05-13
Payer: COMMERCIAL

## 2022-05-13 DIAGNOSIS — E11.9 TYPE 2 DIABETES MELLITUS WITHOUT COMPLICATION, WITHOUT LONG-TERM CURRENT USE OF INSULIN (HCC): ICD-10-CM

## 2022-05-13 LAB
ALBUMIN SERPL BCP-MCNC: 4 G/DL (ref 3.5–5)
ALP SERPL-CCNC: 66 U/L (ref 46–116)
ALT SERPL W P-5'-P-CCNC: 30 U/L (ref 12–78)
ANION GAP SERPL CALCULATED.3IONS-SCNC: 2 MMOL/L (ref 4–13)
AST SERPL W P-5'-P-CCNC: 27 U/L (ref 5–45)
BILIRUB SERPL-MCNC: 0.56 MG/DL (ref 0.2–1)
BUN SERPL-MCNC: 22 MG/DL (ref 5–25)
CALCIUM SERPL-MCNC: 9.7 MG/DL (ref 8.3–10.1)
CHLORIDE SERPL-SCNC: 109 MMOL/L (ref 100–108)
CHOLEST SERPL-MCNC: 141 MG/DL
CO2 SERPL-SCNC: 28 MMOL/L (ref 21–32)
CREAT SERPL-MCNC: 1.55 MG/DL (ref 0.6–1.3)
EST. AVERAGE GLUCOSE BLD GHB EST-MCNC: 134 MG/DL
GFR SERPL CREATININE-BSD FRML MDRD: 47 ML/MIN/1.73SQ M
GLUCOSE P FAST SERPL-MCNC: 116 MG/DL (ref 65–99)
HBA1C MFR BLD: 6.3 %
HDLC SERPL-MCNC: 39 MG/DL
LDLC SERPL CALC-MCNC: 87 MG/DL (ref 0–100)
NONHDLC SERPL-MCNC: 102 MG/DL
POTASSIUM SERPL-SCNC: 4.6 MMOL/L (ref 3.5–5.3)
PROT SERPL-MCNC: 8.3 G/DL (ref 6.4–8.2)
SODIUM SERPL-SCNC: 139 MMOL/L (ref 136–145)
TRIGL SERPL-MCNC: 76 MG/DL

## 2022-05-13 PROCEDURE — 80061 LIPID PANEL: CPT

## 2022-05-13 PROCEDURE — 83036 HEMOGLOBIN GLYCOSYLATED A1C: CPT

## 2022-05-13 PROCEDURE — 80053 COMPREHEN METABOLIC PANEL: CPT

## 2022-05-13 PROCEDURE — 36415 COLL VENOUS BLD VENIPUNCTURE: CPT

## 2022-05-13 PROCEDURE — 3044F HG A1C LEVEL LT 7.0%: CPT | Performed by: FAMILY MEDICINE

## 2022-05-16 ENCOUNTER — OFFICE VISIT (OUTPATIENT)
Dept: FAMILY MEDICINE CLINIC | Facility: CLINIC | Age: 62
End: 2022-05-16
Payer: COMMERCIAL

## 2022-05-16 VITALS
DIASTOLIC BLOOD PRESSURE: 70 MMHG | BODY MASS INDEX: 41.66 KG/M2 | SYSTOLIC BLOOD PRESSURE: 118 MMHG | RESPIRATION RATE: 16 BRPM | HEART RATE: 62 BPM | OXYGEN SATURATION: 96 % | HEIGHT: 70 IN | WEIGHT: 291 LBS

## 2022-05-16 DIAGNOSIS — I10 ESSENTIAL HYPERTENSION: ICD-10-CM

## 2022-05-16 DIAGNOSIS — K59.09 OTHER CONSTIPATION: Primary | ICD-10-CM

## 2022-05-16 DIAGNOSIS — E11.22 CONTROLLED TYPE 2 DIABETES MELLITUS WITH STAGE 3 CHRONIC KIDNEY DISEASE, WITHOUT LONG-TERM CURRENT USE OF INSULIN (HCC): ICD-10-CM

## 2022-05-16 DIAGNOSIS — M79.641 RIGHT HAND PAIN: ICD-10-CM

## 2022-05-16 DIAGNOSIS — R20.2 NUMBNESS AND TINGLING IN RIGHT HAND: ICD-10-CM

## 2022-05-16 DIAGNOSIS — N28.9 RENAL INSUFFICIENCY: ICD-10-CM

## 2022-05-16 DIAGNOSIS — N18.30 CONTROLLED TYPE 2 DIABETES MELLITUS WITH STAGE 3 CHRONIC KIDNEY DISEASE, WITHOUT LONG-TERM CURRENT USE OF INSULIN (HCC): ICD-10-CM

## 2022-05-16 DIAGNOSIS — R20.0 NUMBNESS AND TINGLING IN RIGHT HAND: ICD-10-CM

## 2022-05-16 PROCEDURE — 3008F BODY MASS INDEX DOCD: CPT | Performed by: FAMILY MEDICINE

## 2022-05-16 PROCEDURE — 3066F NEPHROPATHY DOC TX: CPT | Performed by: FAMILY MEDICINE

## 2022-05-16 PROCEDURE — 1036F TOBACCO NON-USER: CPT | Performed by: FAMILY MEDICINE

## 2022-05-16 PROCEDURE — 3074F SYST BP LT 130 MM HG: CPT | Performed by: FAMILY MEDICINE

## 2022-05-16 PROCEDURE — 99214 OFFICE O/P EST MOD 30 MIN: CPT | Performed by: FAMILY MEDICINE

## 2022-05-16 PROCEDURE — 3078F DIAST BP <80 MM HG: CPT | Performed by: FAMILY MEDICINE

## 2022-05-16 PROCEDURE — 3725F SCREEN DEPRESSION PERFORMED: CPT | Performed by: FAMILY MEDICINE

## 2022-05-16 RX ORDER — AMLODIPINE BESYLATE 10 MG/1
10 TABLET ORAL DAILY
Qty: 90 TABLET | Refills: 3 | Status: SHIPPED | OUTPATIENT
Start: 2022-05-16

## 2022-05-16 NOTE — PROGRESS NOTES
Assessment/Plan:    Problem List Items Addressed This Visit        Endocrine    Controlled type 2 diabetes mellitus with stage 3 chronic kidney disease, without long-term current use of insulin ([facility])       Lab Results   Component Value Date    HGBA1C 6 3 (H) 05/13/2022   He will continue his home medication continue working with his diet which should be diabetic and low-fat diet, and he has been taking bariatric 2 am not approved for the weight loss surgery yet           Relevant Orders    Hemoglobin A1C       Cardiovascular and Mediastinum    Essential hypertension     Hypertension under control and is on losartan and amlodipine           Relevant Medications    amLODIPine (NORVASC) 10 mg tablet    Other Relevant Orders    CBC and differential    Comprehensive metabolic panel    Lipid panel    TSH, 3rd generation       Genitourinary    Renal insufficiency     Lab Results   Component Value Date    CREATININE 1 55 (H) 05/13/2022   He has been seeing nephrologist and will follow-up after his labs                Other    Other constipation - Primary     He has long-term constipation he had colonoscopy, and he has appointment with Gastroenterology is probably in a month, he says he goes to bathroom every 2 weeks even though he takes MiraLax and fibers  Will try Linzess           Relevant Medications    linaCLOtide 290 MCG CAPS    Right hand pain    Relevant Orders    Ambulatory Referral to Hand Surgery    Numbness and tingling in right hand     Discussed with them the possibility of carpal tunnel, and arthritis, he will see the hand surgeon           Relevant Orders    Ambulatory Referral to Hand Surgery          Return in about 4 months (around 9/16/2022) for Recheck  Chief Complaint   Patient presents with    Follow-up       Subjective:   Patient ID: Bridgett Ash is a 63 y o  male  Diabetes  He presents for his follow-up diabetic visit  He has type 2 diabetes mellitus  His disease course has been stable  Pertinent negatives for hypoglycemia include no confusion, dizziness, headaches, hunger, mood changes, nervousness/anxiousness, pallor, seizures, sleepiness, speech difficulty, sweats or tremors  Associated symptoms include polyuria  Pertinent negatives for diabetes include no blurred vision, no chest pain, no fatigue, no foot paresthesias, no foot ulcerations, no polydipsia, no polyphagia, no visual change, no weakness and no weight loss  There are no hypoglycemic complications  Diabetic complications include nephropathy  Pertinent negatives for diabetic complications include no autonomic neuropathy, CVA, heart disease, peripheral neuropathy, PVD or retinopathy  Risk factors for coronary artery disease include diabetes mellitus, dyslipidemia, obesity, male sex and hypertension  He is compliant with treatment all of the time  His weight is stable  He is following a generally healthy diet  He has had a previous visit with a dietitian  He participates in exercise intermittently  An ACE inhibitor/angiotensin II receptor blocker is being taken  Eye exam is current  Hypertension  This is a chronic problem  The problem is unchanged  The problem is controlled  Pertinent negatives include no anxiety, blurred vision, chest pain, headaches, malaise/fatigue, neck pain, orthopnea, palpitations, peripheral edema, PND, shortness of breath or sweats  There are no associated agents to hypertension  Risk factors for coronary artery disease include diabetes mellitus, dyslipidemia and male gender  The current treatment provides significant improvement  Hypertensive end-organ damage includes kidney disease  There is no history of CVA, PVD or retinopathy     Has chronic kidney disease and for his nephrologist is labs will be due in 4 months,   complains of right hand tingling numbness intermittently and pain around the fingers, going on for months, he has returned dominant, no problem in the left hand, is working on losing weight even seen by bariatric and not approved by the insurance  He is a long-term constipation issue, he takes MiraLax without any improvement he takes fiber he had colonoscopy and he has follow-up appointment with Gastroenterology  Review of Systems   Constitutional: Negative for fatigue, malaise/fatigue and weight loss  Eyes: Negative for blurred vision  Respiratory: Negative for shortness of breath  Cardiovascular: Negative for chest pain, palpitations, orthopnea and PND  Endocrine: Positive for polyuria  Negative for polydipsia and polyphagia  Musculoskeletal: Negative for neck pain  Skin: Negative for pallor  Neurological: Negative for dizziness, tremors, seizures, speech difficulty, weakness and headaches  Psychiatric/Behavioral: Negative for confusion  The patient is not nervous/anxious  Objective:  Physical Exam  Vitals and nursing note reviewed  Constitutional:       Appearance: He is well-developed  He is obese  He is not ill-appearing  HENT:      Head: Normocephalic and atraumatic  Right Ear: External ear normal       Left Ear: External ear normal    Eyes:      General: No scleral icterus  Right eye: No discharge  Left eye: No discharge  Extraocular Movements: Extraocular movements intact  Conjunctiva/sclera: Conjunctivae normal       Pupils: Pupils are equal, round, and reactive to light  Neck:      Thyroid: No thyromegaly  Trachea: No tracheal deviation  Cardiovascular:      Rate and Rhythm: Normal rate and regular rhythm  Heart sounds: Normal heart sounds  No murmur heard  Pulmonary:      Effort: Pulmonary effort is normal  No respiratory distress  Breath sounds: Normal breath sounds  No wheezing or rales  Abdominal:      General: Bowel sounds are normal  There is no distension  Palpations: Abdomen is soft  There is no mass  Tenderness: There is no abdominal tenderness  There is no rebound     Musculoskeletal: General: No deformity  Normal range of motion  Cervical back: Normal range of motion and neck supple  Right lower leg: No edema  Left lower leg: No edema  Lymphadenopathy:      Cervical: No cervical adenopathy  Skin:     General: Skin is warm  Coloration: Skin is not jaundiced or pale  Findings: No erythema or rash  Neurological:      General: No focal deficit present  Mental Status: He is alert and oriented to person, place, and time  Cranial Nerves: No cranial nerve deficit  Deep Tendon Reflexes: Reflexes are normal and symmetric  Psychiatric:         Behavior: Behavior normal          Thought Content:  Thought content normal          Judgment: Judgment normal             Past Surgical History:   Procedure Laterality Date    COLONOSCOPY      Fiberoptic; Last Assessed: 10/24/2012       Family History   Problem Relation Age of Onset    Heart murmur Mother     Cerebral palsy Mother     Heart disease Mother    [facility] Sudden death Mother         Early   [facility] Breast cancer Sister 48    Pancreatic cancer Maternal Grandmother 61    Emphysema Maternal Grandfather     Hypertension Father     Prostate cancer Father     Colon cancer Neg Hx     Anuerysm Neg Hx     Arrhythmia Neg Hx     Heart failure Neg Hx     Clotting disorder Neg Hx          Current Outpatient Medications:     amLODIPine (NORVASC) 10 mg tablet, Take 1 tablet (10 mg total) by mouth in the morning , Disp: 90 tablet, Rfl: 3    Empagliflozin (Jardiance) 10 MG TABS, Take 1 tablet (10 mg total) by mouth every morning, Disp: 90 tablet, Rfl: 3    glipiZIDE (GLUCOTROL) 5 mg tablet, Take 1 tablet (5 mg total) by mouth daily with breakfast, Disp: 90 tablet, Rfl: 1    linaCLOtide 290 MCG CAPS, Take 1 capsule by mouth in the morning, Disp: 30 capsule, Rfl: 0    losartan (COZAAR) 25 mg tablet, Take 1 tablet (25 mg total) by mouth daily, Disp: 90 tablet, Rfl: 3    metFORMIN (GLUCOPHAGE) 500 mg tablet, Take 1 tablet (500 mg total) by mouth 2 (two) times a day with meals, Disp: 180 tablet, Rfl: 3    sildenafil (VIAGRA) 100 mg tablet, Take 1 tablet (100 mg total) by mouth daily as needed for erectile dysfunction, Disp: 30 tablet, Rfl: 3    Mirabegron ER 25 MG TB24, Take 25 mg by mouth daily at bedtime, Disp: 30 tablet, Rfl: 3    Vibegron 75 MG TABS, Take 75 mg by mouth daily, Disp: 30 tablet, Rfl: 3    Allergies   Allergen Reactions    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

## 2022-05-16 NOTE — ASSESSMENT & PLAN NOTE
He has long-term constipation he had colonoscopy, and he has appointment with Gastroenterology is probably in a month, he says he goes to bathroom every 2 weeks even though he takes MiraLax and fibers    Will try Linzess

## 2022-05-16 NOTE — ASSESSMENT & PLAN NOTE
Lab Results   Component Value Date    HGBA1C 6 3 (H) 05/13/2022   He will continue his home medication continue working with his diet which should be diabetic and low-fat diet, and he has been taking bariatric 2 am not approved for the weight loss surgery yet

## 2022-05-16 NOTE — ASSESSMENT & PLAN NOTE
Lab Results   Component Value Date    CREATININE 1 55 (H) 05/13/2022   He has been seeing nephrologist and will follow-up after his labs

## 2022-06-03 ENCOUNTER — PREPPED CHART (OUTPATIENT)
Dept: URBAN - METROPOLITAN AREA CLINIC 6 | Facility: CLINIC | Age: 62
End: 2022-06-03

## 2022-06-07 ENCOUNTER — NEW PATIENT (OUTPATIENT)
Dept: URBAN - METROPOLITAN AREA CLINIC 6 | Facility: CLINIC | Age: 62
End: 2022-06-07

## 2022-06-07 DIAGNOSIS — H40.023: ICD-10-CM

## 2022-06-07 DIAGNOSIS — H25.813: ICD-10-CM

## 2022-06-07 DIAGNOSIS — E11.9: ICD-10-CM

## 2022-06-07 PROCEDURE — 76514 ECHO EXAM OF EYE THICKNESS: CPT

## 2022-06-07 PROCEDURE — 92133 CPTRZD OPH DX IMG PST SGM ON: CPT

## 2022-06-07 PROCEDURE — 92004 COMPRE OPH EXAM NEW PT 1/>: CPT

## 2022-06-07 ASSESSMENT — PACHYMETRY
OD_CT_UM: 509
OS_CT_UM: 521

## 2022-06-07 ASSESSMENT — KERATOMETRY
OD_K2POWER_DIOPTERS: 45.25
OD_K1POWER_DIOPTERS: 43.75
OS_AXISANGLE2_DEGREES: 76
OD_AXISANGLE2_DEGREES: 90
OS_AXISANGLE_DEGREES: 166
OS_K1POWER_DIOPTERS: 43.25
OD_AXISANGLE_DEGREES: 180
OS_K2POWER_DIOPTERS: 44.75

## 2022-06-07 ASSESSMENT — VISUAL ACUITY
OS_SC: 20/30
OS_GLARE: 20/70
OD_SC: 20/30-1
OD_GLARE: 20/50
OU_SC: J2

## 2022-06-07 ASSESSMENT — TONOMETRY
OD_IOP_MMHG: 16
OS_IOP_MMHG: 15

## 2022-07-26 ENCOUNTER — PRE-OP CATARACT MEASUREMENTS (OUTPATIENT)
Dept: URBAN - METROPOLITAN AREA CLINIC 6 | Facility: CLINIC | Age: 62
End: 2022-07-26

## 2022-07-26 DIAGNOSIS — H40.023: ICD-10-CM

## 2022-07-26 DIAGNOSIS — E11.9: ICD-10-CM

## 2022-07-26 DIAGNOSIS — H25.813: ICD-10-CM

## 2022-07-26 PROCEDURE — 92136 OPHTHALMIC BIOMETRY: CPT

## 2022-07-26 PROCEDURE — 92014 COMPRE OPH EXAM EST PT 1/>: CPT

## 2022-07-26 ASSESSMENT — TONOMETRY
OD_IOP_MMHG: 16
OS_IOP_MMHG: 19

## 2022-07-26 ASSESSMENT — KERATOMETRY
OD_AXISANGLE2_DEGREES: 90
OD_K1POWER_DIOPTERS: 43.50
OS_K2POWER_DIOPTERS: 44.75
OS_AXISANGLE_DEGREES: 166
OS_AXISANGLE2_DEGREES: 84
OS_K1POWER_DIOPTERS: 43.25
OS_AXISANGLE_DEGREES: 174
OD_K1POWER_DIOPTERS: 43.75
OD_K2POWER_DIOPTERS: 45.00
OS_AXISANGLE2_DEGREES: 76
OD_K2POWER_DIOPTERS: 45.25
OD_AXISANGLE_DEGREES: 180

## 2022-07-26 ASSESSMENT — VISUAL ACUITY
OD_GLARE: 20/50
OS_GLARE: 20/70
OS_SC: 20/25
OD_SC: 20/30

## 2022-08-01 ENCOUNTER — OFFICE VISIT (OUTPATIENT)
Dept: FAMILY MEDICINE CLINIC | Facility: CLINIC | Age: 62
End: 2022-08-01
Payer: COMMERCIAL

## 2022-08-01 VITALS
HEIGHT: 70 IN | SYSTOLIC BLOOD PRESSURE: 145 MMHG | HEART RATE: 77 BPM | WEIGHT: 292 LBS | RESPIRATION RATE: 16 BRPM | BODY MASS INDEX: 41.8 KG/M2 | DIASTOLIC BLOOD PRESSURE: 80 MMHG | OXYGEN SATURATION: 96 %

## 2022-08-01 DIAGNOSIS — E11.22 CONTROLLED TYPE 2 DIABETES MELLITUS WITH STAGE 3 CHRONIC KIDNEY DISEASE, WITHOUT LONG-TERM CURRENT USE OF INSULIN (HCC): ICD-10-CM

## 2022-08-01 DIAGNOSIS — I10 ESSENTIAL HYPERTENSION: ICD-10-CM

## 2022-08-01 DIAGNOSIS — I12.9 BENIGN HYPERTENSION WITH CHRONIC KIDNEY DISEASE, STAGE III (HCC): ICD-10-CM

## 2022-08-01 DIAGNOSIS — G47.33 OSA (OBSTRUCTIVE SLEEP APNEA): ICD-10-CM

## 2022-08-01 DIAGNOSIS — N18.30 CONTROLLED TYPE 2 DIABETES MELLITUS WITH STAGE 3 CHRONIC KIDNEY DISEASE, WITHOUT LONG-TERM CURRENT USE OF INSULIN (HCC): ICD-10-CM

## 2022-08-01 DIAGNOSIS — H25.89 OTHER AGE-RELATED CATARACT OF BOTH EYES: ICD-10-CM

## 2022-08-01 DIAGNOSIS — Z01.818 PREOPERATIVE CLEARANCE: Primary | ICD-10-CM

## 2022-08-01 DIAGNOSIS — N18.30 BENIGN HYPERTENSION WITH CHRONIC KIDNEY DISEASE, STAGE III (HCC): ICD-10-CM

## 2022-08-01 PROBLEM — H26.9 CATARACTA: Status: ACTIVE | Noted: 2022-08-01

## 2022-08-01 PROCEDURE — 3077F SYST BP >= 140 MM HG: CPT | Performed by: FAMILY MEDICINE

## 2022-08-01 PROCEDURE — 3725F SCREEN DEPRESSION PERFORMED: CPT | Performed by: FAMILY MEDICINE

## 2022-08-01 PROCEDURE — 3066F NEPHROPATHY DOC TX: CPT | Performed by: FAMILY MEDICINE

## 2022-08-01 PROCEDURE — 99243 OFF/OP CNSLTJ NEW/EST LOW 30: CPT | Performed by: FAMILY MEDICINE

## 2022-08-01 PROCEDURE — 3079F DIAST BP 80-89 MM HG: CPT | Performed by: FAMILY MEDICINE

## 2022-08-01 RX ORDER — OFLOXACIN 3 MG/ML
SOLUTION/ DROPS OPHTHALMIC
COMMUNITY
Start: 2022-07-26

## 2022-08-01 RX ORDER — PREDNISOLONE ACETATE 10 MG/ML
SUSPENSION/ DROPS OPHTHALMIC
COMMUNITY
Start: 2022-07-26

## 2022-08-01 NOTE — PROGRESS NOTES
Assessment/Plan:    Problem List Items Addressed This Visit        Endocrine    Controlled type 2 diabetes mellitus with stage 3 chronic kidney disease, without long-term current use of insulin ([facility] [address] )  Lab Results   Component Value Date    HGBA1C 6 3 (H) 05/13/2022   advised to not take metformin and glizipide in am of surgery          Respiratory    STACY (obstructive sleep apnea)       Cardiovascular and Mediastinum    Benign hypertension with chronic kidney disease, stage III ([facility] [address] )  Lab Results   Component Value Date    CREATININE 1 55 (H) 05/13/2022     Follows nephrologist        Other    Cataracta    Relevant Medications    ofloxacin (OCUFLOX) 0 3 % ophthalmic solution    prednisoLONE acetate (PRED FORTE) 1 % ophthalmic suspension    Preoperative clearance - Primary      He is cleared for cataract surgery , had echo 2018 , normal and the form will be faxed to his ophthalmologist     No follow-ups on file  Chief Complaint   Patient presents with    Pre-op Exam       Subjective:   Patient ID: Radha Schwab is a 63 y o  male  Procedure: b/l cataract   Date of surgery: 8/11/22 , 9/1/22  Surgeon: dr Thiago Shipley   Indication of surgery b/l cataract  Surgical Risk Assessment :  Prior anesthesia: yes   Coronary artery disease:non  CHF:non  Wear dentures:no  Sleep apnea:yes   TMJ:non  Diabetes mellitus:yes   Hypertension:yes  Coagulation disorder:non  Seizure disorder:non   exercise capacity:  Able to walk 2 flights of stairs without symptoms:yes  DVT:non  yes      Review of Systems   Constitutional: Negative for activity change, appetite change, chills, fatigue, fever and unexpected weight change  HENT: Negative for congestion, ear discharge, ear pain, nosebleeds, postnasal drip, rhinorrhea, sinus pressure, sneezing, sore throat, trouble swallowing and voice change  Eyes: Negative for photophobia, pain, discharge, redness and itching     Respiratory: Negative for cough, chest tightness, shortness of breath and wheezing  Cardiovascular: Negative for chest pain, palpitations and leg swelling  Gastrointestinal: Negative for abdominal pain, constipation, diarrhea, nausea and vomiting  Endocrine: Negative for polyuria  Genitourinary: Negative for dysuria, frequency and urgency  Musculoskeletal: Negative for arthralgias, back pain, myalgias and neck pain  Skin: Negative for color change, pallor and rash  Allergic/Immunologic: Negative for environmental allergies and food allergies  Neurological: Negative for dizziness, weakness, light-headedness and headaches  Hematological: Negative for adenopathy  Does not bruise/bleed easily  Psychiatric/Behavioral: Negative for behavioral problems and sleep disturbance  The patient is not nervous/anxious  Objective:  Physical Exam  Vitals and nursing note reviewed  Constitutional:       Appearance: Normal appearance  He is well-developed  He is not ill-appearing  HENT:      Head: Normocephalic and atraumatic  Right Ear: External ear normal       Left Ear: External ear normal       Nose: Nose normal       Mouth/Throat:      Pharynx: No oropharyngeal exudate  Eyes:      General: No scleral icterus  Right eye: No discharge  Left eye: No discharge  Extraocular Movements: Extraocular movements intact  Conjunctiva/sclera: Conjunctivae normal       Pupils: Pupils are equal, round, and reactive to light  Neck:      Thyroid: No thyromegaly  Trachea: No tracheal deviation  Cardiovascular:      Rate and Rhythm: Normal rate and regular rhythm  Heart sounds: Normal heart sounds  No murmur heard  Pulmonary:      Effort: Pulmonary effort is normal  No respiratory distress  Breath sounds: Normal breath sounds  No wheezing or rales  Musculoskeletal:         General: Normal range of motion  Cervical back: Normal range of motion and neck supple  Right lower leg: No edema  Left lower leg: No edema  Lymphadenopathy:      Cervical: No cervical adenopathy  Skin:     General: Skin is warm  Coloration: Skin is not jaundiced or pale  Findings: No bruising, erythema or rash  Neurological:      General: No focal deficit present  Mental Status: He is alert and oriented to person, place, and time  Cranial Nerves: No cranial nerve deficit  Gait: Gait normal       Deep Tendon Reflexes: Reflexes are normal and symmetric  Psychiatric:         Behavior: Behavior normal          Thought Content:  Thought content normal          Judgment: Judgment normal             Past Surgical History:   Procedure Laterality Date    COLONOSCOPY      Fiberoptic; Last Assessed: 10/24/2012       Family History   Problem Relation Age of Onset    Heart murmur Mother     Cerebral palsy Mother     Heart disease Mother    [facility] Sudden death Mother         Early   [facility] Breast cancer Sister 48    Pancreatic cancer Maternal Grandmother 61    Emphysema Maternal Grandfather     Hypertension Father     Prostate cancer Father     Colon cancer Neg Hx     Anuerysm Neg Hx     Arrhythmia Neg Hx     Heart failure Neg Hx     Clotting disorder Neg Hx          Current Outpatient Medications:     amLODIPine (NORVASC) 10 mg tablet, Take 1 tablet (10 mg total) by mouth in the morning , Disp: 90 tablet, Rfl: 3    Empagliflozin (Jardiance) 10 MG TABS, Take 1 tablet (10 mg total) by mouth every morning, Disp: 90 tablet, Rfl: 3    glipiZIDE (GLUCOTROL) 5 mg tablet, Take 1 tablet (5 mg total) by mouth daily with breakfast, Disp: 90 tablet, Rfl: 1    losartan (COZAAR) 25 mg tablet, Take 1 tablet (25 mg total) by mouth daily, Disp: 90 tablet, Rfl: 3    metFORMIN (GLUCOPHAGE) 500 mg tablet, Take 1 tablet (500 mg total) by mouth 2 (two) times a day with meals, Disp: 180 tablet, Rfl: 3    Mirabegron ER 25 MG TB24, Take 25 mg by mouth daily at bedtime, Disp: 30 tablet, Rfl: 3    ofloxacin (OCUFLOX) 0 3 % ophthalmic solution, , Disp: , Rfl:     prednisoLONE acetate (PRED FORTE) 1 % ophthalmic suspension, , Disp: , Rfl:     sildenafil (VIAGRA) 100 mg tablet, Take 1 tablet (100 mg total) by mouth daily as needed for erectile dysfunction, Disp: 30 tablet, Rfl: 3    Vibegron 75 MG TABS, Take 75 mg by mouth daily, Disp: 30 tablet, Rfl: 3    linaCLOtide 290 MCG CAPS, Take 1 capsule by mouth in the morning, Disp: 30 capsule, Rfl: 0    Allergies   Allergen Reactions    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

## 2022-08-19 ENCOUNTER — LAB (OUTPATIENT)
Dept: LAB | Facility: CLINIC | Age: 62
End: 2022-08-19
Payer: COMMERCIAL

## 2022-08-19 DIAGNOSIS — I12.9 BENIGN HYPERTENSION WITH CHRONIC KIDNEY DISEASE, STAGE III (HCC): ICD-10-CM

## 2022-08-19 DIAGNOSIS — N18.31 STAGE 3A CHRONIC KIDNEY DISEASE (HCC): ICD-10-CM

## 2022-08-19 DIAGNOSIS — N18.30 CONTROLLED TYPE 2 DIABETES MELLITUS WITH STAGE 3 CHRONIC KIDNEY DISEASE, WITHOUT LONG-TERM CURRENT USE OF INSULIN (HCC): ICD-10-CM

## 2022-08-19 DIAGNOSIS — I10 ESSENTIAL HYPERTENSION: ICD-10-CM

## 2022-08-19 DIAGNOSIS — N18.30 BENIGN HYPERTENSION WITH CHRONIC KIDNEY DISEASE, STAGE III (HCC): ICD-10-CM

## 2022-08-19 DIAGNOSIS — E11.22 CONTROLLED TYPE 2 DIABETES MELLITUS WITH STAGE 3 CHRONIC KIDNEY DISEASE, WITHOUT LONG-TERM CURRENT USE OF INSULIN (HCC): ICD-10-CM

## 2022-08-19 LAB
ALBUMIN SERPL BCP-MCNC: 3.6 G/DL (ref 3.5–5)
ALP SERPL-CCNC: 69 U/L (ref 46–116)
ALT SERPL W P-5'-P-CCNC: 38 U/L (ref 12–78)
ANION GAP SERPL CALCULATED.3IONS-SCNC: 4 MMOL/L (ref 4–13)
AST SERPL W P-5'-P-CCNC: 27 U/L (ref 5–45)
BASOPHILS # BLD AUTO: 0.02 THOUSANDS/ΜL (ref 0–0.1)
BASOPHILS NFR BLD AUTO: 0 % (ref 0–1)
BILIRUB SERPL-MCNC: 0.42 MG/DL (ref 0.2–1)
BUN SERPL-MCNC: 24 MG/DL (ref 5–25)
CALCIUM SERPL-MCNC: 9.2 MG/DL (ref 8.3–10.1)
CHLORIDE SERPL-SCNC: 107 MMOL/L (ref 96–108)
CHOLEST SERPL-MCNC: 144 MG/DL
CO2 SERPL-SCNC: 27 MMOL/L (ref 21–32)
CREAT SERPL-MCNC: 1.46 MG/DL (ref 0.6–1.3)
EOSINOPHIL # BLD AUTO: 0.14 THOUSAND/ΜL (ref 0–0.61)
EOSINOPHIL NFR BLD AUTO: 2 % (ref 0–6)
ERYTHROCYTE [DISTWIDTH] IN BLOOD BY AUTOMATED COUNT: 15.1 % (ref 11.6–15.1)
EST. AVERAGE GLUCOSE BLD GHB EST-MCNC: 140 MG/DL
GFR SERPL CREATININE-BSD FRML MDRD: 51 ML/MIN/1.73SQ M
GLUCOSE P FAST SERPL-MCNC: 135 MG/DL (ref 65–99)
HBA1C MFR BLD: 6.5 %
HCT VFR BLD AUTO: 41.1 % (ref 36.5–49.3)
HDLC SERPL-MCNC: 40 MG/DL
HGB BLD-MCNC: 12.4 G/DL (ref 12–17)
IMM GRANULOCYTES # BLD AUTO: 0.01 THOUSAND/UL (ref 0–0.2)
IMM GRANULOCYTES NFR BLD AUTO: 0 % (ref 0–2)
LDLC SERPL CALC-MCNC: 81 MG/DL (ref 0–100)
LYMPHOCYTES # BLD AUTO: 1.83 THOUSANDS/ΜL (ref 0.6–4.47)
LYMPHOCYTES NFR BLD AUTO: 32 % (ref 14–44)
MAGNESIUM SERPL-MCNC: 2.1 MG/DL (ref 1.6–2.6)
MCH RBC QN AUTO: 28.5 PG (ref 26.8–34.3)
MCHC RBC AUTO-ENTMCNC: 30.2 G/DL (ref 31.4–37.4)
MCV RBC AUTO: 95 FL (ref 82–98)
MONOCYTES # BLD AUTO: 0.66 THOUSAND/ΜL (ref 0.17–1.22)
MONOCYTES NFR BLD AUTO: 11 % (ref 4–12)
NEUTROPHILS # BLD AUTO: 3.12 THOUSANDS/ΜL (ref 1.85–7.62)
NEUTS SEG NFR BLD AUTO: 55 % (ref 43–75)
NONHDLC SERPL-MCNC: 104 MG/DL
NRBC BLD AUTO-RTO: 0 /100 WBCS
PHOSPHATE SERPL-MCNC: 2.6 MG/DL (ref 2.3–4.1)
PLATELET # BLD AUTO: 163 THOUSANDS/UL (ref 149–390)
PMV BLD AUTO: 12.3 FL (ref 8.9–12.7)
POTASSIUM SERPL-SCNC: 4.7 MMOL/L (ref 3.5–5.3)
PROT SERPL-MCNC: 8 G/DL (ref 6.4–8.4)
PTH-INTACT SERPL-MCNC: 62.2 PG/ML (ref 18.4–80.1)
RBC # BLD AUTO: 4.35 MILLION/UL (ref 3.88–5.62)
SODIUM SERPL-SCNC: 138 MMOL/L (ref 135–147)
TRIGL SERPL-MCNC: 114 MG/DL
TSH SERPL DL<=0.05 MIU/L-ACNC: 1.74 UIU/ML (ref 0.45–4.5)
WBC # BLD AUTO: 5.78 THOUSAND/UL (ref 4.31–10.16)

## 2022-08-19 PROCEDURE — 84100 ASSAY OF PHOSPHORUS: CPT

## 2022-08-19 PROCEDURE — 83036 HEMOGLOBIN GLYCOSYLATED A1C: CPT

## 2022-08-19 PROCEDURE — 80053 COMPREHEN METABOLIC PANEL: CPT

## 2022-08-19 PROCEDURE — 84443 ASSAY THYROID STIM HORMONE: CPT

## 2022-08-19 PROCEDURE — 80061 LIPID PANEL: CPT

## 2022-08-19 PROCEDURE — 83970 ASSAY OF PARATHORMONE: CPT

## 2022-08-19 PROCEDURE — 36415 COLL VENOUS BLD VENIPUNCTURE: CPT

## 2022-08-19 PROCEDURE — 3044F HG A1C LEVEL LT 7.0%: CPT | Performed by: FAMILY MEDICINE

## 2022-08-19 PROCEDURE — 85025 COMPLETE CBC W/AUTO DIFF WBC: CPT

## 2022-08-19 PROCEDURE — 83735 ASSAY OF MAGNESIUM: CPT

## 2022-08-22 ENCOUNTER — APPOINTMENT (OUTPATIENT)
Dept: LAB | Facility: CLINIC | Age: 62
End: 2022-08-22
Payer: COMMERCIAL

## 2022-08-22 LAB
AMORPH URATE CRY URNS QL MICRO: ABNORMAL
BACTERIA UR QL AUTO: ABNORMAL /HPF
BILIRUB UR QL STRIP: NEGATIVE
CLARITY UR: CLEAR
COLOR UR: ABNORMAL
CREAT UR-MCNC: 125 MG/DL
GLUCOSE UR STRIP-MCNC: ABNORMAL MG/DL
HGB UR QL STRIP.AUTO: NEGATIVE
KETONES UR STRIP-MCNC: NEGATIVE MG/DL
LEUKOCYTE ESTERASE UR QL STRIP: NEGATIVE
NITRITE UR QL STRIP: NEGATIVE
NON-SQ EPI CELLS URNS QL MICRO: ABNORMAL /HPF
PH UR STRIP.AUTO: 6 [PH]
PROT UR STRIP-MCNC: NEGATIVE MG/DL
PROT UR-MCNC: 13 MG/DL
PROT/CREAT UR: 0.1 MG/G{CREAT} (ref 0–0.1)
RBC #/AREA URNS AUTO: ABNORMAL /HPF
SP GR UR STRIP.AUTO: 1.02 (ref 1–1.03)
UROBILINOGEN UR STRIP-ACNC: <2 MG/DL
WBC #/AREA URNS AUTO: ABNORMAL /HPF

## 2022-08-22 PROCEDURE — 84156 ASSAY OF PROTEIN URINE: CPT

## 2022-08-22 PROCEDURE — 81001 URINALYSIS AUTO W/SCOPE: CPT

## 2022-08-22 PROCEDURE — 3061F NEG MICROALBUMINURIA REV: CPT | Performed by: FAMILY MEDICINE

## 2022-08-22 PROCEDURE — 82570 ASSAY OF URINE CREATININE: CPT

## 2022-08-29 ENCOUNTER — OFFICE VISIT (OUTPATIENT)
Dept: FAMILY MEDICINE CLINIC | Facility: CLINIC | Age: 62
End: 2022-08-29
Payer: COMMERCIAL

## 2022-08-29 VITALS
SYSTOLIC BLOOD PRESSURE: 130 MMHG | HEIGHT: 70 IN | WEIGHT: 299 LBS | HEART RATE: 67 BPM | BODY MASS INDEX: 42.8 KG/M2 | RESPIRATION RATE: 16 BRPM | DIASTOLIC BLOOD PRESSURE: 70 MMHG | OXYGEN SATURATION: 96 %

## 2022-08-29 DIAGNOSIS — B35.3 TINEA PEDIS OF BOTH FEET: ICD-10-CM

## 2022-08-29 DIAGNOSIS — E11.22 CONTROLLED TYPE 2 DIABETES MELLITUS WITH STAGE 3 CHRONIC KIDNEY DISEASE, WITHOUT LONG-TERM CURRENT USE OF INSULIN (HCC): Primary | ICD-10-CM

## 2022-08-29 DIAGNOSIS — I12.9 BENIGN HYPERTENSION WITH CHRONIC KIDNEY DISEASE, STAGE III (HCC): ICD-10-CM

## 2022-08-29 DIAGNOSIS — G47.33 OSA (OBSTRUCTIVE SLEEP APNEA): ICD-10-CM

## 2022-08-29 DIAGNOSIS — N18.30 CONTROLLED TYPE 2 DIABETES MELLITUS WITH STAGE 3 CHRONIC KIDNEY DISEASE, WITHOUT LONG-TERM CURRENT USE OF INSULIN (HCC): Primary | ICD-10-CM

## 2022-08-29 DIAGNOSIS — N18.30 BENIGN HYPERTENSION WITH CHRONIC KIDNEY DISEASE, STAGE III (HCC): ICD-10-CM

## 2022-08-29 DIAGNOSIS — I10 ESSENTIAL HYPERTENSION: ICD-10-CM

## 2022-08-29 PROBLEM — E88.09 HYPERPROTEINEMIA: Status: RESOLVED | Noted: 2022-01-11 | Resolved: 2022-08-29

## 2022-08-29 PROCEDURE — 99214 OFFICE O/P EST MOD 30 MIN: CPT | Performed by: FAMILY MEDICINE

## 2022-08-29 RX ORDER — CLOTRIMAZOLE 1 %
CREAM (GRAM) TOPICAL 2 TIMES DAILY
Qty: 30 G | Refills: 0 | Status: SHIPPED | OUTPATIENT
Start: 2022-08-29

## 2022-08-29 NOTE — PROGRESS NOTES
Assessment/Plan:    Problem List Items Addressed This Visit        Endocrine    Controlled type 2 diabetes mellitus with stage 3 chronic kidney disease, without long-term current use of insulin ([facility] [address] ) - Primary       Lab Results   Component Value Date    HGBA1C 6 5 (H) 08/19/2022   His A1c is gradually going up, discussed with him to lose weight and cut down his carbs,         Relevant Orders    Diabetic foot exam    Hemoglobin Q8X    Basic metabolic panel    CBC and differential       Respiratory    STACY (obstructive sleep apnea)     His sleeps well with the CPAP machine            Cardiovascular and Mediastinum    Essential hypertension     Hypertension is stable, continue low-salt diet, continue same medications  Benign hypertension with chronic kidney disease, stage III (HCC)       Musculoskeletal and Integument    Tinea pedis of both feet     His skin is moist and he has fungal infection in between the toes of 4th and 5th, advised to use anti fungal cream for at least 2 weeks and keep the skin dry         Relevant Medications    clotrimazole (LOTRIMIN) 1 % cream          Return in 4 months (on 12/29/2022) for Diabetes follow-up  Chief Complaint   Patient presents with    Follow-up       Subjective:   Patient ID: Leyda Cervantes is a 63 y o  male  Follow-up on diabetes, hypertension, he does not take any statins but his cholesterol remains very good, he denies any chest pain or shortness of breath, he follows nephrologist for his chronic kidney disease,  He had colonoscopy last year and he is supposed to call him again this year for follow-up  Review of Systems   Constitutional: Negative for activity change, appetite change, chills, fatigue, fever and unexpected weight change  HENT: Negative for congestion, ear discharge, ear pain, nosebleeds, postnasal drip, rhinorrhea, sinus pressure, sneezing, sore throat, trouble swallowing and voice change      Eyes: Negative for photophobia, pain, discharge, redness and itching  Respiratory: Negative for cough, chest tightness, shortness of breath and wheezing  Cardiovascular: Negative for chest pain, palpitations and leg swelling  Gastrointestinal: Negative for abdominal pain, constipation, diarrhea, nausea and vomiting  Endocrine: Negative for polyuria  Genitourinary: Negative for dysuria, frequency and urgency  Musculoskeletal: Negative for arthralgias, back pain, myalgias and neck pain  Skin: Negative for color change, pallor and rash  Allergic/Immunologic: Negative for environmental allergies and food allergies  Neurological: Negative for dizziness, weakness, light-headedness and headaches  Hematological: Negative for adenopathy  Does not bruise/bleed easily  Psychiatric/Behavioral: Negative for behavioral problems  The patient is not nervous/anxious  Objective:  Physical Exam  Vitals and nursing note reviewed  Constitutional:       Appearance: He is well-developed  HENT:      Head: Normocephalic and atraumatic  Right Ear: Tympanic membrane, ear canal and external ear normal  There is no impacted cerumen  Left Ear: Tympanic membrane, ear canal and external ear normal  There is no impacted cerumen  Nose: Nose normal       Mouth/Throat:      Mouth: Mucous membranes are moist       Pharynx: No oropharyngeal exudate  Eyes:      General: No scleral icterus  Right eye: No discharge  Left eye: No discharge  Conjunctiva/sclera: Conjunctivae normal       Pupils: Pupils are equal, round, and reactive to light  Neck:      Thyroid: No thyromegaly  Trachea: No tracheal deviation  Cardiovascular:      Rate and Rhythm: Normal rate and regular rhythm  Pulses: no weak pulses          Dorsalis pedis pulses are 2+ on the right side and 2+ on the left side  Heart sounds: Normal heart sounds  No murmur heard  Pulmonary:      Effort: Pulmonary effort is normal  No respiratory distress  Breath sounds: Normal breath sounds  No wheezing or rales  Abdominal:      General: Bowel sounds are normal  There is no distension  Palpations: Abdomen is soft  There is no mass  Tenderness: There is no abdominal tenderness  There is no rebound  Musculoskeletal:         General: Normal range of motion  Cervical back: Normal range of motion and neck supple  Right lower leg: No edema  Left lower leg: No edema  Feet:      Right foot:      Skin integrity: No ulcer, skin breakdown, erythema, warmth, callus or dry skin  Left foot:      Skin integrity: No ulcer, skin breakdown, erythema, warmth, callus or dry skin  Lymphadenopathy:      Cervical: No cervical adenopathy  Skin:     General: Skin is warm  Coloration: Skin is not pale  Findings: No erythema or rash  Comments: Moist skin in between toes especially 4th and 5th b/l   Neurological:      Mental Status: He is alert and oriented to person, place, and time  Cranial Nerves: No cranial nerve deficit  Deep Tendon Reflexes: Reflexes are normal and symmetric  Psychiatric:         Behavior: Behavior normal          Thought Content: Thought content normal          Judgment: Judgment normal         Patient's shoes and socks removed  Right Foot/Ankle   Right Foot Inspection  Skin Exam: skin normal  Skin not intact, no dry skin, no warmth, no callus, no erythema, no maceration, no abnormal color, no pre-ulcer, no ulcer and no callus  Toe Exam: ROM and strength within normal limits  Sensory   Vibration: intact  Proprioception: intact  Monofilament testing: intact    Vascular  Capillary refills: < 3 seconds  The right DP pulse is 2+  Left Foot/Ankle  Left Foot Inspection  Skin Exam: skin normal  Skin not intact, no dry skin, no warmth, no erythema, no maceration, normal color, no pre-ulcer, no ulcer and no callus  Toe Exam: ROM and strength within normal limits       Sensory   Vibration: intact  Proprioception: intact  Monofilament testing: intact    Vascular  Capillary refills: < 3 seconds  The left DP pulse is 2+       Assign Risk Category  No deformity present  No loss of protective sensation  No weak pulses  Risk: 0        Past Surgical History:   Procedure Laterality Date    COLONOSCOPY      Fiberoptic; Last Assessed: 10/24/2012       Family History   Problem Relation Age of Onset    Heart murmur Mother     Cerebral palsy Mother     Heart disease Mother    [facility] Sudden death Mother         Early   [facility] Breast cancer Sister 48    Pancreatic cancer Maternal Grandmother 61    Emphysema Maternal Grandfather     Hypertension Father     Prostate cancer Father     Colon cancer Neg Hx     Anuerysm Neg Hx     Arrhythmia Neg Hx     Heart failure Neg Hx     Clotting disorder Neg Hx          Current Outpatient Medications:     amLODIPine (NORVASC) 10 mg tablet, Take 1 tablet (10 mg total) by mouth in the morning , Disp: 90 tablet, Rfl: 3    clotrimazole (LOTRIMIN) 1 % cream, Apply topically 2 (two) times a day, Disp: 30 g, Rfl: 0    Empagliflozin (Jardiance) 10 MG TABS, Take 1 tablet (10 mg total) by mouth every morning, Disp: 90 tablet, Rfl: 3    glipiZIDE (GLUCOTROL) 5 mg tablet, Take 1 tablet (5 mg total) by mouth daily with breakfast, Disp: 90 tablet, Rfl: 1    losartan (COZAAR) 25 mg tablet, Take 1 tablet (25 mg total) by mouth daily, Disp: 90 tablet, Rfl: 3    metFORMIN (GLUCOPHAGE) 500 mg tablet, Take 1 tablet (500 mg total) by mouth 2 (two) times a day with meals, Disp: 180 tablet, Rfl: 3    Mirabegron ER 25 MG TB24, Take 25 mg by mouth daily at bedtime, Disp: 30 tablet, Rfl: 3    ofloxacin (OCUFLOX) 0 3 % ophthalmic solution, , Disp: , Rfl:     prednisoLONE acetate (PRED FORTE) 1 % ophthalmic suspension, , Disp: , Rfl:     sildenafil (VIAGRA) 100 mg tablet, Take 1 tablet (100 mg total) by mouth daily as needed for erectile dysfunction, Disp: 30 tablet, Rfl: 3    Vibegron 75 MG TABS, Take 75 mg by mouth daily, Disp: 30 tablet, Rfl: 3    linaCLOtide 290 MCG CAPS, Take 1 capsule by mouth in the morning, Disp: 30 capsule, Rfl: 0    Allergies   Allergen Reactions    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     Annotation - [id number]: Allergic to nuts, ok with Peanuts and Almonds       Vitals:    08/29/22 0957   BP: 130/70   Pulse: 67   Resp: 16   SpO2: 96%   Weight: 136 kg (299 lb)   Height: 5' 10" (1 778 m)     BMI Counseling: Body mass index is 42 9 kg/m²  The BMI is above normal  Nutrition recommendations include decreasing portion sizes, moderation in carbohydrate intake and reducing intake of cholesterol  Exercise recommendations include exercising 3-5 times per week  Rationale for BMI follow-up plan is due to patient being overweight or obese  Depression Screening and Follow-up Plan: Patient was screened for depression during today's encounter  They screened negative with a PHQ-2 score of 0

## 2022-08-29 NOTE — PATIENT INSTRUCTIONS

## 2022-08-29 NOTE — ASSESSMENT & PLAN NOTE
Lab Results   Component Value Date    HGBA1C 6 5 (H) 08/19/2022   His A1c is gradually going up, discussed with him to lose weight and cut down his carbs,

## 2022-08-29 NOTE — ASSESSMENT & PLAN NOTE
His skin is moist and he has fungal infection in between the toes of 4th and 5th, advised to use anti fungal cream for at least 2 weeks and keep the skin dry

## 2022-09-14 ENCOUNTER — TELEPHONE (OUTPATIENT)
Dept: NEPHROLOGY | Facility: CLINIC | Age: 62
End: 2022-09-14

## 2022-09-28 PROBLEM — N18.31 STAGE 3A CHRONIC KIDNEY DISEASE (HCC): Status: ACTIVE | Noted: 2022-09-28

## 2022-09-28 NOTE — PATIENT INSTRUCTIONS
Your kidney function remains stable  Most recent creatinine 1 46 and remains at baseline  We will continue routine surveillance as outlined below  Avoid all NSAIDs to include ibuprofen, Motrin, Aleve, Advil, Naproxen, Celebrex, Indomethacin, Toradol  Stay well hydrated  Continue all prescribed medications  Call if blood pressure consistently more than 140/90 or less than 110/50s  High and low blood pressures may affect your kidney function  Recommend low salt diet (2 gm sodium diet)  Please let us know if you are scheduled for any studies with IV contrast (ex: CT scan, arteriogram or cardiac catheterization)   Follow up in 6 months with Dr Berenice Bolton with repeat labs prior to appointment  Kidney Smart education program recommended for general education regarding your known chronic kidney disease  Please contact the office with new symptoms or concerns

## 2022-09-28 NOTE — PROGRESS NOTES
Assessment and Plan:  Chronic kidney disease IIIA:    -Etiology:  Suspect hypertensive nephrosclerosis and diabetic kidney disease  -Baseline creatinine 1 4-1 5  -Follows with Dr Rebekah Patricio  -recent creatinine 1 46, baseline  -UPCr 0 10, stable  UA bland  -electrolytes and acid-base stable  Hemoglobin stable at 12 4  -avoid nephrotoxins, NSAIDs, hypotension and IV contrast if possible  -stay well hydrated  -Kidney Smart/CKD education discussed  Referral placed  -follow-up with Dr Rebekah Patricio in 6 months with repeat blood and urine studies  Hypertension/Volume status:  -BP acceptable and at goal  -volume status euvolemic  -continue current medications:  Amlodipine 10 mg daily, losartan 25 mg daily  -Avoid hypotension or fluctuations in blood pressure    -low sodium (2 gm) diet  Encourage regular exercise  -continue to monitor BP at home    Bone Mineral Disease of CKD:  -phosphorus 2 6, at cold stable  -PTH 62 2, at goal and stable  -vitamin-D deficiency:  Vitamin D 25 24 8 in 2018  Currently not on any Vit D supplements  Will check prior to next appt  -renal diet  -continue to monitor  Repeat phosphorus, PTH, calcium, magnesium and vitamin D25 prior to next appointment    DM II:  -stable  -HgbA1C 6 5  -continue to optimize glycemic control to slow progression of chronic kidney disease  -management per primary team    Age related screening: Your primary caregiver may do yearly screening for colorectal cancer  It is recommended in all men and women over 48years old  You may have screening earlier if you have colon disease or a family history of colorectal cancer      Rickie Menchaca was seen today for follow-up      Diagnoses and all orders for this visit:    Stage 3a chronic kidney disease ([facility] [address] )    Benign hypertension with chronic kidney disease, stage III ([facility] [address] )    Controlled type 2 diabetes mellitus with stage 3 chronic kidney disease, without long-term current use of insulin (HCC)    Vitamin D deficiency        Follow up with Dr Ramos Miner in 6 months with repeat blood and urine studies  Please call the office with any questions or concerns  Reason for Visit: Follow-up (CKD3a)    HPI: Adela Maldonado is a 63 y o  male former smoker with CKD 3A, baseline creatinine 1 4-1 5, DM 2, HTN, STACY, BPH, vitamin-D deficiency who presents for follow up of CKD  Patient followed by Dr Kasandra Nino, last seen 2/23/2022  Renal function stable  Most recent creatinine 1 46 and at baseline  Urine protein creatinine ratio 0 10, minimal   Patient denies recent hospitalizations or ER visits  Patient denies NSAID use  Patient denies nausea, vomiting, diarrhea, dyspnea, orthopnea, edema, hematuria or foamy urine       ROS: A complete review of systems was performed and was negative unless otherwise noted in the history of present illness      Allergies:   Cashew nut oil - food allergy    Medications:     Current Outpatient Medications:     amLODIPine (NORVASC) 10 mg tablet, Take 1 tablet (10 mg total) by mouth in the morning , Disp: 90 tablet, Rfl: 3    clotrimazole (LOTRIMIN) 1 % cream, Apply topically 2 (two) times a day, Disp: 30 g, Rfl: 0    Empagliflozin (Jardiance) 10 MG TABS, Take 1 tablet (10 mg total) by mouth every morning, Disp: 90 tablet, Rfl: 3    glipiZIDE (GLUCOTROL) 5 mg tablet, Take 1 tablet (5 mg total) by mouth daily with breakfast, Disp: 90 tablet, Rfl: 1    linaCLOtide 290 MCG CAPS, Take 1 capsule by mouth in the morning, Disp: 30 capsule, Rfl: 0    losartan (COZAAR) 25 mg tablet, Take 1 tablet (25 mg total) by mouth daily, Disp: 90 tablet, Rfl: 3    metFORMIN (GLUCOPHAGE) 500 mg tablet, Take 1 tablet (500 mg total) by mouth 2 (two) times a day with meals, Disp: 180 tablet, Rfl: 3    sildenafil (VIAGRA) 100 mg tablet, Take 1 tablet (100 mg total) by mouth daily as needed for erectile dysfunction, Disp: 30 tablet, Rfl: 3    Mirabegron ER 25 MG TB24, Take 25 mg by mouth daily at bedtime (Patient not taking: Reported on 10/3/2022), Disp: 30 tablet, Rfl: 3    ofloxacin (OCUFLOX) 0 3 % ophthalmic solution, , Disp: , Rfl:     prednisoLONE acetate (PRED FORTE) 1 % ophthalmic suspension, , Disp: , Rfl:     Vibegron 75 MG TABS, Take 75 mg by mouth daily (Patient not taking: Reported on 10/3/2022), Disp: 30 tablet, Rfl: 3    Past Medical History:   Diagnosis Date    Anxiety     BPH (benign prostatic hyperplasia)     Colon polyp     CPAP (continuous positive airway pressure) dependence     Diabetes mellitus ([facility] [address] )     Difficulty breathing 01/05/2011    Hypertension     Impaired fasting blood sugar     Last Assessed: 1/2/2013    Obesity     Sleep apnea      Past Surgical History:   Procedure Laterality Date    COLONOSCOPY      Fiberoptic; Last Assessed: 10/24/2012     Family History   Problem Relation Age of Onset    Heart murmur Mother     Cerebral palsy Mother     Heart disease Mother     Sudden death Mother         Early    Breast cancer Sister 48    Pancreatic cancer Maternal Grandmother 61    Emphysema Maternal Grandfather     Hypertension Father     Prostate cancer Father     Colon cancer Neg Hx     Anuerysm Neg Hx     Arrhythmia Neg Hx     Heart failure Neg Hx     Clotting disorder Neg Hx       reports that he quit smoking about 5 years ago  His smoking use included cigarettes  He has a 7 50 pack-year smoking history  He has never used smokeless tobacco  He reports current alcohol use of about 6 0 standard drinks of alcohol per week  He reports that he does not use drugs  Physical Exam:   Vitals:    10/03/22 1420 10/03/22 1432   BP:  132/70   BP Location:  Right arm   Patient Position:  Sitting   Cuff Size:  Large   Weight: (!) 137 kg (302 lb)    Height: 5' 10" (1 778 m)      Body mass index is 43 33 kg/m²  General:  Awake, alert, appears comfortable and in no acute distress  Nontoxic  Skin:  No rash, warm, good skin turgor   Eyes:  PERRL, EOMI, sclerae nonicteric    no periorbital edema   ENT:  Moist mucous membranes  Neck:  Trachea midline, symmetric  No JVD  No carotid bruits  Chest:  Clear to auscultation bilaterally without wheezes, crackles or rhonchi  CVS:  Regular rate and rhythm without murmur, gallop or rub  S1 and S2 identified and normal   No S3, S4    Abdomen:  Soft, nontender, nondistended without masses  Normal bowel sounds x 4 quadrants  No bruit  Extremities:  Warm, pink, motor and sensory intact and well perfused  No cyanosis, pallor  No BLE edema  Neuro:  Awake, alert, oriented x3  Grossly intact  Psych:  Appropriate affect  Mentating appropriately  Normal mental status exam      Procedure:  No results found for this or any previous visit  Lab Results   Component Value Date    CALCIUM 9 2 08/19/2022    K 4 7 08/19/2022    CO2 27 08/19/2022     08/19/2022    BUN 24 08/19/2022    CREATININE 1 46 (H) 08/19/2022             Invalid input(s): ALBUMIN    EMR, including Epic, Care Everywhere and outside scanned documents reviewed  I have personally reviewed the blood work as stated above and in my note  I have personally reviewed PCP, consultants and prior nephrology notes

## 2022-10-03 ENCOUNTER — OFFICE VISIT (OUTPATIENT)
Dept: NEPHROLOGY | Facility: CLINIC | Age: 62
End: 2022-10-03
Payer: COMMERCIAL

## 2022-10-03 VITALS
BODY MASS INDEX: 43.23 KG/M2 | SYSTOLIC BLOOD PRESSURE: 132 MMHG | HEIGHT: 70 IN | WEIGHT: 302 LBS | DIASTOLIC BLOOD PRESSURE: 70 MMHG

## 2022-10-03 DIAGNOSIS — N18.31 STAGE 3A CHRONIC KIDNEY DISEASE (HCC): Primary | ICD-10-CM

## 2022-10-03 DIAGNOSIS — N18.30 BENIGN HYPERTENSION WITH CHRONIC KIDNEY DISEASE, STAGE III (HCC): ICD-10-CM

## 2022-10-03 DIAGNOSIS — E11.22 CONTROLLED TYPE 2 DIABETES MELLITUS WITH STAGE 3 CHRONIC KIDNEY DISEASE, WITHOUT LONG-TERM CURRENT USE OF INSULIN (HCC): ICD-10-CM

## 2022-10-03 DIAGNOSIS — I12.9 BENIGN HYPERTENSION WITH CHRONIC KIDNEY DISEASE, STAGE III (HCC): ICD-10-CM

## 2022-10-03 DIAGNOSIS — N18.30 CONTROLLED TYPE 2 DIABETES MELLITUS WITH STAGE 3 CHRONIC KIDNEY DISEASE, WITHOUT LONG-TERM CURRENT USE OF INSULIN (HCC): ICD-10-CM

## 2022-10-03 DIAGNOSIS — E55.9 VITAMIN D DEFICIENCY: ICD-10-CM

## 2022-10-03 PROCEDURE — 99214 OFFICE O/P EST MOD 30 MIN: CPT | Performed by: PHYSICIAN ASSISTANT

## 2022-10-03 PROCEDURE — 3066F NEPHROPATHY DOC TX: CPT | Performed by: FAMILY MEDICINE

## 2022-10-04 ENCOUNTER — TELEPHONE (OUTPATIENT)
Dept: GASTROENTEROLOGY | Facility: CLINIC | Age: 62
End: 2022-10-04

## 2022-10-10 ENCOUNTER — OFFICE VISIT (OUTPATIENT)
Dept: UROLOGY | Facility: CLINIC | Age: 62
End: 2022-10-10
Payer: COMMERCIAL

## 2022-10-10 VITALS
DIASTOLIC BLOOD PRESSURE: 72 MMHG | HEART RATE: 72 BPM | OXYGEN SATURATION: 96 % | SYSTOLIC BLOOD PRESSURE: 134 MMHG | HEIGHT: 69 IN | WEIGHT: 300 LBS | BODY MASS INDEX: 44.43 KG/M2

## 2022-10-10 DIAGNOSIS — N39.41 URGE INCONTINENCE OF URINE: ICD-10-CM

## 2022-10-10 DIAGNOSIS — N32.81 OAB (OVERACTIVE BLADDER): Primary | ICD-10-CM

## 2022-10-10 LAB — POST-VOID RESIDUAL VOLUME, ML POC: 15 ML

## 2022-10-10 PROCEDURE — 99213 OFFICE O/P EST LOW 20 MIN: CPT | Performed by: PHYSICIAN ASSISTANT

## 2022-10-10 PROCEDURE — 51798 US URINE CAPACITY MEASURE: CPT | Performed by: PHYSICIAN ASSISTANT

## 2022-10-10 NOTE — PROGRESS NOTES
UROLOGY PROGRESS NOTE   Patient Identifiers: Aki Carey (MRN [id number])  Date of Service: 10/10/2022    Subjective:    27-year-old man history of lower urinary tract symptoms and incomplete bladder emptying  His cystoscopy last year showed small volume gland and elevated bladder neck with large capacity bladder  He was on Vibegron but his PVR was 245  He is off all medications now his PVR is 15 mL  He complains of urinary frequency and urge incontinence  Hemoglobin A1c 6 3      Reason for visit:  Urge incontinence follow-up    Objective:     VITALS:    Vitals:    10/10/22 1327   BP: 134/72   Pulse: 72   SpO2: 96%     AUA SYMPTOM SCORE    Flowsheet Row Most Recent Value   AUA SYMPTOM SCORE    How often have you had a sensation of not emptying your bladder completely after you finished urinating? 2 (P)     How often have you had to urinate again less than two hours after you finished urinating? 3 (P)     How often have you found you stopped and started again several times when you urinate? 0 (P)     How often have you found it difficult to postpone urination? 5 (P)     How often have you had a weak urinary stream? 3 (P)     How often have you had to push or strain to begin urination? 2 (P)     How many times did you most typically get up to urinate from the time you went to bed at night until the time you got up in the morning? 5 (P)     Quality of Life: If you were to spend the rest of your life with your urinary condition just the way it is now, how would you feel about that? 6 (P)     AUA SYMPTOM SCORE 20 (P)             LABS:  Lab Results   Component Value Date    HGB 12 4 08/19/2022    HCT 41 1 08/19/2022    WBC 5 78 08/19/2022     08/19/2022   ]    Lab Results   Component Value Date    K 4 7 08/19/2022     08/19/2022    CO2 27 08/19/2022    BUN 24 08/19/2022    CREATININE 1 46 (H) 08/19/2022    CALCIUM 9 2 08/19/2022   ]        INPATIENT MEDS:    Current Outpatient Medications:   • amLODIPine (NORVASC) 10 mg tablet, Take 1 tablet (10 mg total) by mouth in the morning , Disp: 90 tablet, Rfl: 3  •  clotrimazole (LOTRIMIN) 1 % cream, Apply topically 2 (two) times a day, Disp: 30 g, Rfl: 0  •  Empagliflozin (Jardiance) 10 MG TABS, Take 1 tablet (10 mg total) by mouth every morning, Disp: 90 tablet, Rfl: 3  •  glipiZIDE (GLUCOTROL) 5 mg tablet, Take 1 tablet (5 mg total) by mouth daily with breakfast, Disp: 90 tablet, Rfl: 1  •  losartan (COZAAR) 25 mg tablet, Take 1 tablet (25 mg total) by mouth daily, Disp: 90 tablet, Rfl: 3  •  metFORMIN (GLUCOPHAGE) 500 mg tablet, Take 1 tablet (500 mg total) by mouth 2 (two) times a day with meals, Disp: 180 tablet, Rfl: 3  •  sildenafil (VIAGRA) 100 mg tablet, Take 1 tablet (100 mg total) by mouth daily as needed for erectile dysfunction, Disp: 30 tablet, Rfl: 3  •  linaCLOtide 290 MCG CAPS, Take 1 capsule by mouth in the morning, Disp: 30 capsule, Rfl: 0  •  Mirabegron ER 25 MG TB24, Take 25 mg by mouth daily at bedtime (Patient not taking: No sig reported), Disp: 30 tablet, Rfl: 3  •  ofloxacin (OCUFLOX) 0 3 % ophthalmic solution, , Disp: , Rfl:   •  prednisoLONE acetate (PRED FORTE) 1 % ophthalmic suspension, , Disp: , Rfl:   •  Vibegron 75 MG TABS, Take 75 mg by mouth daily (Patient not taking: No sig reported), Disp: 30 tablet, Rfl: 3      Physical Exam:   /72   Pulse 72   Ht 5' 9" (1 753 m)   Wt 136 kg (300 lb)   SpO2 96%   BMI 44 30 kg/m²   GEN: no acute distress    RESP: breathing comfortably with no accessory muscle use    ABD: soft, non-tender, non-distended   INCISION:    EXT: no significant peripheral edema     RADIOLOGY:   None     Assessment:   1   Urge urinary incontinence     Plan:   -his wife inquired about physical therapy  -I put in an ambulatory referral for pelvic floor physical therapy  -I offered him a trial of another medication particularly Sanctura which he declined  -follow-up in 6 months

## 2022-11-05 ENCOUNTER — PREP FOR PROCEDURE (OUTPATIENT)
Dept: GASTROENTEROLOGY | Facility: CLINIC | Age: 62
End: 2022-11-05

## 2022-11-05 ENCOUNTER — TELEPHONE (OUTPATIENT)
Dept: GASTROENTEROLOGY | Facility: CLINIC | Age: 62
End: 2022-11-05

## 2022-11-05 DIAGNOSIS — D12.6 ADENOMATOUS POLYP OF COLON, UNSPECIFIED PART OF COLON: Primary | ICD-10-CM

## 2022-11-05 NOTE — TELEPHONE ENCOUNTER
Scheduled date of colonoscopy (as of today):12/21/22  Physician performing colonoscopy:Dr Canas  Location of colonoscopy:  Bowel prep reviewed with patient:Bryan/Dul-pt requested   Instructions reviewed with patient by:LINNEA  Clearances: NA Diabetic    PT PASSED OA    Per pt request mailed prep instr to home address

## 2022-11-11 DIAGNOSIS — N52.8 OTHER MALE ERECTILE DYSFUNCTION: ICD-10-CM

## 2022-11-14 RX ORDER — SILDENAFIL 100 MG/1
100 TABLET, FILM COATED ORAL DAILY PRN
Qty: 30 TABLET | Refills: 3 | Status: SHIPPED | OUTPATIENT
Start: 2022-11-14

## 2022-11-21 ENCOUNTER — EVALUATION (OUTPATIENT)
Dept: PHYSICAL THERAPY | Facility: REHABILITATION | Age: 62
End: 2022-11-21

## 2022-11-21 DIAGNOSIS — N39.41 URGE INCONTINENCE OF URINE: ICD-10-CM

## 2022-11-21 NOTE — PROGRESS NOTES
PT Evaluation     Today's date: 2022  Patient name: Nathaniel Bermudez  : 1960  MRN: [id number]  Referring provider: Katheryn Cuba PA-C  Dx:   Encounter Diagnosis     ICD-10-CM    1  Urge incontinence of urine  N39 41 Ambulatory Referral to Physical Therapy          Start Time: 1100  Stop Time: 1200  Total time in clinic (min): 60 minutes    Assessment  Assessment details: General:  The patient is a 63 y o  male with complaints of urinary frequency and urge incontinence of urine which began about one year ago  His history includes Diabetes Mellitus Type II, Chronic Kidney Disease (stage 3a), and sleep apnea  He also experiences constipation with reduced frequency of bowel movements  He is having a colonoscopy on  and following up with GI in regards to this  He does also see nephrology  Education provided today  Biofeedback assessment will be performed next visit  He would benefit from pelvic floor physical therapy to help reduce/manage pain and symptoms, address impairments and maximize function and quality of life upon discharge  He will be given updated HEP throughout episode of care  Thank you for the referral     Therapeutic activities performed upon examination included education regarding pelvic floor anatomy, explanation of exam technique, explanation of exam findings and discussion of treatment plan as well as expectations of the patient to emphasize the importance of compliance and adherence to physical therapy visits  Impairments: abnormal coordination, abnormal or restricted ROM, activity intolerance, impaired physical strength, lacks appropriate home exercise program, pain with function, poor posture  and poor body mechanics    Goals  General:  ST  The patient will reduce urinary urgency by 25-50% frequency in 12 weeks  2  The patient will be able to isolate pelvic floor muscles to perform a proper contraction in 4 to 6 weeks     3  The patient will be able to fully relax his pelvic floor muscles and demonstrate good coordination in 8 weeks  LT  The patient will demonstrate improved bladder control with minimal to no urgency/frequency episodes upon discharge  2  The patient will be independent with HEP upon discharge  3  The patient will control her bladder to get to the bathroom without leakage upon discharge  Plan  Plan details: Once a week to once every two weeks due to high deductible plan  Patient would benefit from: skilled physical therapy  Planned modality interventions: biofeedback  Planned therapy interventions: abdominal trunk stabilization, behavior modification, body mechanics training, breathing training, coordination, flexibility, IADL retraining, manual therapy, neuromuscular re-education, patient education, postural training, self care, strengthening, stretching, therapeutic activities, functional ROM exercises and home exercise program  Frequency: 1x week  Duration in visits: 12  Duration in weeks: 12  Plan of Care beginning date: 2022  Plan of Care expiration date: 2023  Treatment plan discussed with: patient        PT Pelvic Floor Subjective:   History of Present Illness: The patient notes that he is experiencing frequent urination  He was hospitalized last year for his kidney  He went to the bathroom and was not able to move his legs afterwards  He was diagnosed with kidney disease (stage 3a) after this and has had issues issues with frequency/urgency since  He reports a stop/start flow of his urine and has to sit longer to wait to empty more at times  He gets up 3-4 times a night to empty his bladder  He notes that his frequency has improved very slightly since stopping Losartan  He notes improvement in daytime frequency  He has urgency when he is driving long distance  He has to stop at least twice when he drives to [place] to see his father  He reports that he does experience constipation as well   He is seeing Urology and he did try a prescription medication for his bladder symptoms but this did not help  He does see GI and is having a colonoscopy on 12/21  He follows up with Urology in April of 2023  Social Support:     Work status: employed full time (front end )  Bladder Function:     Voiding Difficulties positive for: urgency (with driving), frequent urination and incomplete emptying (more at night)      Voiding Difficulties negative for: straining (stop/go stream at times)      Voiding Difficulties comments:     Voiding frequency: every 1-2 hours    Urinary leakage: urine leakage (+ urge incontinence)    Urinary leakage aggravated by: walking to the bathroom and key-in-the-door syndrome    Urinary leakage not aggravated by: coughing and sneezing    Nocturia (episodes per night): 4 or more and 3    Fluid Intake Type:  Tea, water and coffee    Intake (ounces):      Intake (ounces) comment: Iced Tea or Iced Coffee: 32 ounces a day (later in the day - after work)  Celsius: sparkling water/energy drink: 8-10 ounce can a day (zero sugar)  Coffee: 1 cup in the morning  Water: 16-32 ounces a day (while at work)  Occasional soda     Incontinence Management:     Pads/Diapers Additional Comments: wears a pad on occasion if going out and about  Bowel Function:     Voiding DIfficulties: painful defecating, unfinished feeling after defecating and constipation      Bowel Function comments:  Patient is taking Dulcalax daily; he notes that this will take effect usually later the next day  BM frequency is once a week or less  Constipation started about one year ago  Enema did not give patient any relief in the hospital one year ago  Rakesh Dennis but did not tolerate well  Patient notes that he is bloated and gassy    No hemorrhoids    Bowel frequency: more than every 4 days    [place] Stool Scale: type 1      Objective   Pelvic Floor Exam     General Perineum Exam:     General perineum exam comments: Pelvic floor verbal consent and written consent signed and in chart  Patient deferred second person in room: YES/NO    Education provided today:   Time Spent on Patient Education:    Pelvic floor anatomy and function  Physiology/relationship of abdominal canister and pelvis/pelvic organs/pelvic floor muscles  Diaphragm and Diaphragmatic breathing  Bowel and Bladder anatomy and function    Pelvic Organ Prolapse - explanation and discussion of management of symptoms  Importance of body mechanics and ergonomics in regards to protecting against activities which increase IAP and pressure    Toileting posture and body mechanics  Constipation Education  Pelvic and chronic pain education  "The Poo in You" Educational video (peds and family) on Youtube    Hormonal and MSK changes during pregnancy   Post partum posture and MSK changes  Hormonal changes during and after menopause  Surgical and post op considerations  PT exam and course of treatment  Bladder and Bowel diary     Future Education To be Provided:                Precautions: DM Type II; chronic kidney disease;   Tactilize HEP    Manuals 11/21                                                                Neuro Re-Ed             Diaphragmatic Breathing             TA ADIM             TA + PFMC                          Biofeedback sEMG             PFMC: Slow Holds             PFMC: Quick Flicks             PFMC + hip abd isometrics             PFMC + hip add isometrics             PFMC + ecc bridge             PFMC in sitting             PFMC seated + tilt             PFMC in standing             PFMC in Quadruped                                                                 Ther Ex             PPT             Bridge + SLR             Bridge + marching                                                                              Ther Activity             EDUCATION             PFMC + reach             PFMC + squat             PFMC + lift             PFMC + sit to stand             PFMC + step up/down             Gait Training                                       Modalities

## 2022-11-28 ENCOUNTER — APPOINTMENT (OUTPATIENT)
Dept: PHYSICAL THERAPY | Facility: REHABILITATION | Age: 62
End: 2022-11-28

## 2022-12-02 ENCOUNTER — OFFICE VISIT (OUTPATIENT)
Dept: FAMILY MEDICINE CLINIC | Facility: CLINIC | Age: 62
End: 2022-12-02

## 2022-12-02 VITALS
DIASTOLIC BLOOD PRESSURE: 82 MMHG | HEIGHT: 69 IN | WEIGHT: 300.2 LBS | HEART RATE: 68 BPM | SYSTOLIC BLOOD PRESSURE: 144 MMHG | BODY MASS INDEX: 44.46 KG/M2 | OXYGEN SATURATION: 95 % | TEMPERATURE: 97.8 F

## 2022-12-02 DIAGNOSIS — R09.81 NASAL CONGESTION: ICD-10-CM

## 2022-12-02 DIAGNOSIS — R06.7 SNEEZING: ICD-10-CM

## 2022-12-02 DIAGNOSIS — R05.1 ACUTE COUGH: ICD-10-CM

## 2022-12-02 DIAGNOSIS — R50.9 FEVER AND CHILLS: ICD-10-CM

## 2022-12-02 DIAGNOSIS — R06.00 PND (PAROXYSMAL NOCTURNAL DYSPNEA): Primary | ICD-10-CM

## 2022-12-02 RX ORDER — AZELASTINE 1 MG/ML
1 SPRAY, METERED NASAL 2 TIMES DAILY
Qty: 30 ML | Refills: 0 | Status: SHIPPED | OUTPATIENT
Start: 2022-12-02

## 2022-12-02 NOTE — LETTER
December 2, 2022     Patient: Deisy Jalloh  YOB: 1960  Date of Visit: 12/2/2022      To Whom it May Concern:    Deisy Jalloh is under my professional care  Arminda Dennis was seen in my office on 12/2/2022  Please excuse the patient from 11/30 -12/02/2020 to Arminda Dennis may return to work on 12/03/2020 to as long as he remains afebrile and feels fit to go back to work  If you have any questions or concerns, please don't hesitate to call                     Sincerely,          Trenton Munoz, DO

## 2022-12-02 NOTE — PATIENT INSTRUCTIONS
Please start taking Mucinex DM to help with thinning of the mucus and drainage  It is very important that you remain hydrated for the medication works best if your hydrating well to help thin the fluids  Please start using the Astelin spray 1 spray in each nostril twice a day  You may start this morning in take your next dose in the evening  Make sure that your avoiding the middle of the nose when your spring it, this can cause some nose bleeds if you continue to spray in the middle of the nose  How you avoid this is tic the nozzle in just a small amount, tilted towards the ear lobe and spray  Small sniffs not the ones that is going to bring the mucus to the back of the throat  Please continue to monitor symptoms, if you do have any worsening fever, chills, nausea, vomiting, lightheadedness, dizziness please do not hesitate to give us a call on Monday for follow-up with your primary care provider

## 2022-12-02 NOTE — PROGRESS NOTES
Outpatient Note- Follow up     HPI:     Maylin Hameed , 63 y o  male  presents today for URI symptoms  The patient started symptoms on Saturday/Sunday of this past weekend  It started with sneezing and progress progressed to include rhinorrhea, nasal congestion, sore throat, postnasal drip  He denies any significant allergies during this time of year  Past Medical History:   Diagnosis Date   • Anxiety    • BPH (benign prostatic hyperplasia)    • Colon polyp    • CPAP (continuous positive airway pressure) dependence    • Diabetes mellitus ([facility] [address] )    • Difficulty breathing 01/05/2011   • Hypertension    • Impaired fasting blood sugar     Last Assessed: 1/2/2013   • Obesity    • Sleep apnea       ROS:   Review of Systems   Constitutional: Negative for chills (sunday to [place]UNDAG[place]) and fever  HENT: Positive for congestion, postnasal drip, rhinorrhea and sore throat  Negative for ear discharge, ear pain, sinus pressure and sinus pain  Respiratory: Positive for shortness of breath  Negative for cough and chest tightness  Cardiovascular: Negative for chest pain and palpitations  Gastrointestinal: Positive for constipation  Negative for abdominal pain, diarrhea, nausea and vomiting  Musculoskeletal: Negative for arthralgias and myalgias  Skin: Negative for rash and wound  Allergic/Immunologic: Negative for environmental allergies  Neurological: Positive for light-headedness  Negative for dizziness and headaches  OBJECTIVE  Vitals:    12/02/22 0905   BP: 144/82   Pulse: 68   Temp: 97 8 °F (36 6 °C)   SpO2: 95%        Physical Exam  Constitutional:       General: He is not in acute distress  Appearance: He is well-developed  He is ill-appearing  He is not toxic-appearing or diaphoretic  Comments: Tired looking, fatigued, bags under eyes     HENT:      Head: Normocephalic and atraumatic  Right Ear: Ear canal normal  No drainage, swelling or tenderness   A middle ear effusion ( serous) is present  Tympanic membrane is not erythematous  Left Ear: Ear canal normal  No drainage, swelling or tenderness  A middle ear effusion ( serous) is present  Tympanic membrane is not erythematous  Nose: Congestion and rhinorrhea present  Mouth/Throat:      Mouth: Mucous membranes are moist  No oral lesions  Pharynx: Posterior oropharyngeal erythema ( mild increase in vascularity) present  No pharyngeal swelling, oropharyngeal exudate or uvula swelling  Tonsils: No tonsillar exudate or tonsillar abscesses  0 on the right  0 on the left  Comments: Significant postnasal drip, white, frothy sputum in pharynx as well as around the base of the tongue  Eyes:      Pupils: Pupils are equal, round, and reactive to light  Comments: Cataracts in both eyes, moderate to severe   Cardiovascular:      Rate and Rhythm: Normal rate and regular rhythm  Heart sounds: Normal heart sounds  No murmur heard  No friction rub  No gallop  Pulmonary:      Effort: Pulmonary effort is normal  No respiratory distress  Breath sounds: No stridor  No wheezing, rhonchi or rales  Comments: Decreased breath sounds overall, unknown baseline since he has not my patient  Abdominal:      General: There is no distension  Palpations: Abdomen is soft  Tenderness: There is no abdominal tenderness  Musculoskeletal:      Cervical back: Neck supple  Lymphadenopathy:      Cervical: No cervical adenopathy  Skin:     General: Skin is warm  Capillary Refill: Capillary refill takes less than 2 seconds  I personally reviewed the patient's most recent CMP and did discuss his lower kidney function  He should avoid Advil or NSAIDs at this time  ASSESSMENT AND PLAN   Lou Lobo was seen today for sore throat      Diagnoses and all orders for this visit:    PND (paroxysmal nocturnal dyspnea)  Sneezing  Acute cough  Fever and chills  Nasal congestion  Patient presents today with significant postnasal drip, season, acute cough, and nasal congestion  This is all looking to be a more allergic like picture, with possible combination with viral symptoms  I do not believe the patient has COVID or the flu  Will treat him conservatively with Mucinex DM for nasal congestion, rhinorrhea, and cough  The Astelin will be for the significant postnasal drip that is present on examination and the patient is bringing up  The postnasal drip is likely causing his sore throat, therefore by treating the PND we will treat his other symptoms throat irritation  He is to take Tylenol up to 1000 mg 4 times a day, I would attempt to avoid ibuprofen if possible due to his history of kidney dysfunction, this was reviewed with patient before he left  Note given for the past several days of being ill, he feels that he may be able to return to work tomorrow with the use of the medication and rest today  -     dextromethorphan-guaifenesin (MUCINEX DM)  MG per 12 hr tablet;  Take 1 tablet by mouth every 12 (twelve) hours  -     azelastine (ASTELIN) 0 1 % nasal spray; 1 spray into each nostril 2 (two) times a day Use in each nostril as directed       Garry Jacob DO  [facility]  12/2/2022 9:31 AM

## 2022-12-05 ENCOUNTER — OFFICE VISIT (OUTPATIENT)
Dept: PHYSICAL THERAPY | Facility: REHABILITATION | Age: 62
End: 2022-12-05

## 2022-12-05 DIAGNOSIS — N39.41 URGE INCONTINENCE OF URINE: Primary | ICD-10-CM

## 2022-12-05 NOTE — PROGRESS NOTES
Daily Note     Today's date: 2022  Patient name: Aram Allen  : 1960  MRN: [id number]  Referring provider: Deejay Orellana PA-C  Dx:   Encounter Diagnosis     ICD-10-CM    1  Urge incontinence of urine  N39 41           Start Time: 1400  Stop Time: 1445  Total time in clinic (min): 45 minutes    Subjective: The patient was ill last week and he was coughing a lot and therefore was leaking a lot "constantly"  He is recovered from his illness and notes that he is much better in regards to the leakage that he was experiencing  He notes that he is taking Laxatives daily to help with his constipation and it is helping him to have bowel movements with more ease  He does feel that this is helping with some of the frequency and urgency as well  He only got up once last night to use the bathroom  Objective: See treatment diary below      Assessment: Tolerated treatment well  Patient performed Biofeedback today  He demonstrated normal resting muscle tone of his pelvic floor muscles < 2 0 mV on average and in between contractions  Recruitment between 15 0 and 20 0 mV  No fatigued noted over course of 10 contractions but patient felt some muscle fatigue  He also was able to hold his contractions for about 2-3 seconds maximum without strain and demonstrated nice diaphragmatic breathing  He was given HEP for home today and educated in regards to not over doing exercises ( quality > quantity ) to prevent muscle fatigue and potential increased leakage  He seemed to have a good overall awareness of his pelvic floor muscles aside from seeing it on Biofeedback  He will continue with HEP at home then will return for next PT follow up visit in 2 weeks  Plan: Continue per plan of care        Precautions: DM Type II; chronic kidney disease;   Mixgar HEP    Manuals                                                                Neuro Re-Ed             Diaphragmatic Breathing             BARRY BROTHERS TA + PFMC                          Biofeedback sEMG  30 min total           PFMC: Slow Holds  2x10           PFMC: Quick Flicks  11B supine           PFMC + hip abd isometrics             PFMC + hip add isometrics             PFMC + ecc bridge             PFMC in sitting  10x QF's    5x SH's           PFMC seated + tilt             PFMC in standing             PFMC in Quadruped                                                                 Ther Ex             PPT             Bridge + SLR             Bridge + marching                                                                              Ther Activity             EDUCATION  15 min           PFMC + reach             PFMC + squat             PFMC + lift             PFMC + sit to stand             PFMC + step up/down             Gait Training                                       Modalities

## 2022-12-12 ENCOUNTER — APPOINTMENT (OUTPATIENT)
Dept: PHYSICAL THERAPY | Facility: REHABILITATION | Age: 62
End: 2022-12-12

## 2022-12-19 ENCOUNTER — APPOINTMENT (OUTPATIENT)
Dept: PHYSICAL THERAPY | Facility: REHABILITATION | Age: 62
End: 2022-12-19

## 2022-12-21 ENCOUNTER — HOSPITAL ENCOUNTER (OUTPATIENT)
Dept: GASTROENTEROLOGY | Facility: HOSPITAL | Age: 62
Setting detail: OUTPATIENT SURGERY
Discharge: HOME | End: 2022-12-21
Attending: INTERNAL MEDICINE

## 2022-12-21 ENCOUNTER — ANESTHESIA (OUTPATIENT)
Dept: GASTROENTEROLOGY | Facility: HOSPITAL | Age: 62
End: 2022-12-21

## 2022-12-21 ENCOUNTER — ANESTHESIA EVENT (OUTPATIENT)
Dept: GASTROENTEROLOGY | Facility: HOSPITAL | Age: 62
End: 2022-12-21

## 2022-12-21 VITALS
WEIGHT: 299.3 LBS | TEMPERATURE: 97.4 F | HEART RATE: 60 BPM | OXYGEN SATURATION: 99 % | DIASTOLIC BLOOD PRESSURE: 78 MMHG | RESPIRATION RATE: 18 BRPM | SYSTOLIC BLOOD PRESSURE: 138 MMHG | BODY MASS INDEX: 42.85 KG/M2 | HEIGHT: 70 IN

## 2022-12-21 DIAGNOSIS — D12.6 ADENOMATOUS POLYP OF COLON, UNSPECIFIED PART OF COLON: ICD-10-CM

## 2022-12-21 LAB — GLUCOSE SERPL-MCNC: 114 MG/DL (ref 65–140)

## 2022-12-21 RX ORDER — SODIUM CHLORIDE, SODIUM LACTATE, POTASSIUM CHLORIDE, CALCIUM CHLORIDE 600; 310; 30; 20 MG/100ML; MG/100ML; MG/100ML; MG/100ML
INJECTION, SOLUTION INTRAVENOUS CONTINUOUS PRN
Status: DISCONTINUED | OUTPATIENT
Start: 2022-12-21 | End: 2022-12-21

## 2022-12-21 RX ORDER — LIDOCAINE HYDROCHLORIDE 10 MG/ML
INJECTION, SOLUTION EPIDURAL; INFILTRATION; INTRACAUDAL; PERINEURAL AS NEEDED
Status: DISCONTINUED | OUTPATIENT
Start: 2022-12-21 | End: 2022-12-21

## 2022-12-21 RX ORDER — PROPOFOL 10 MG/ML
INJECTION, EMULSION INTRAVENOUS AS NEEDED
Status: DISCONTINUED | OUTPATIENT
Start: 2022-12-21 | End: 2022-12-21

## 2022-12-21 RX ADMIN — PROPOFOL 20 MG: 10 INJECTION, EMULSION INTRAVENOUS at 07:44

## 2022-12-21 RX ADMIN — PROPOFOL 20 MG: 10 INJECTION, EMULSION INTRAVENOUS at 07:50

## 2022-12-21 RX ADMIN — PROPOFOL 20 MG: 10 INJECTION, EMULSION INTRAVENOUS at 07:46

## 2022-12-21 RX ADMIN — LIDOCAINE HYDROCHLORIDE 50 MG: 10 INJECTION, SOLUTION EPIDURAL; INFILTRATION; INTRACAUDAL at 07:34

## 2022-12-21 RX ADMIN — PROPOFOL 20 MG: 10 INJECTION, EMULSION INTRAVENOUS at 07:48

## 2022-12-21 RX ADMIN — PROPOFOL 20 MG: 10 INJECTION, EMULSION INTRAVENOUS at 07:42

## 2022-12-21 RX ADMIN — SODIUM CHLORIDE, SODIUM LACTATE, POTASSIUM CHLORIDE, AND CALCIUM CHLORIDE: .6; .31; .03; .02 INJECTION, SOLUTION INTRAVENOUS at 07:33

## 2022-12-21 RX ADMIN — PROPOFOL 20 MG: 10 INJECTION, EMULSION INTRAVENOUS at 07:40

## 2022-12-21 RX ADMIN — PROPOFOL 50 MG: 10 INJECTION, EMULSION INTRAVENOUS at 07:37

## 2022-12-21 RX ADMIN — PROPOFOL 100 MG: 10 INJECTION, EMULSION INTRAVENOUS at 07:34

## 2022-12-21 NOTE — H&P
History and Physical - [facility]  Teresa Rodriguez 63 y o  male MRN: [id number]                  HPI: Teresa Rodriguez is a 63y o  year old male who presents for colonoscopy      REVIEW OF SYSTEMS: Per the HPI, and otherwise unremarkable      Historical Information   Past Medical History:   Diagnosis Date   • Anxiety    • BPH (benign prostatic hyperplasia)    • Colon polyp    • CPAP (continuous positive airway pressure) dependence    • Diabetes mellitus ([address] )    • Difficulty breathing 2011   • Hypertension    • Impaired fasting blood sugar     Last Assessed: 2013   • Obesity    • Sleep apnea      Past Surgical History:   Procedure Laterality Date   • COLONOSCOPY      Fiberoptic; Last Assessed: 10/24/2012     Social History   Social History     Substance and Sexual Activity   Alcohol Use Yes   • Alcohol/week: 6 0 standard drinks   • Types: 6 Standard drinks or equivalent per week    Comment: occ     Social History     Substance and Sexual Activity   Drug Use No     Social History     Tobacco Use   Smoking Status Former   • Packs/day: 0 25   • Years: 30 00   • Pack years: 7 50   • Types: Cigarettes   • Quit date: 2017   • Years since quittin 5   Smokeless Tobacco Never     Family History   Problem Relation Age of Onset   • Heart murmur Mother    • Cerebral palsy Mother    • Heart disease Mother    • Sudden death Mother         Early   • Breast cancer Sister 48   • Pancreatic cancer Maternal Grandmother 61   • Emphysema Maternal Grandfather    • Hypertension Father    • Prostate cancer Father    • Colon cancer Neg Hx    • Anuerysm Neg Hx    • Arrhythmia Neg Hx    • Heart failure Neg Hx    • Clotting disorder Neg Hx        Meds/Allergies       Current Outpatient Medications:   •  amLODIPine (NORVASC) 10 mg tablet  •  azelastine (ASTELIN) 0 1 % nasal spray  •  clotrimazole (LOTRIMIN) 1 % cream  •  dextromethorphan-guaifenesin (MUCINEX DM)  MG per 12 hr tablet  •  Empagliflozin (Jardiance) 10 MG TABS  •  glipiZIDE (GLUCOTROL) 5 mg tablet  •  losartan (COZAAR) 25 mg tablet  •  metFORMIN (GLUCOPHAGE) 500 mg tablet  •  linaCLOtide 290 MCG CAPS  •  Mirabegron ER 25 MG TB24  •  ofloxacin (OCUFLOX) 0 3 % ophthalmic solution  •  prednisoLONE acetate (PRED FORTE) 1 % ophthalmic suspension  •  sildenafil (VIAGRA) 100 mg tablet  •  Vibegron 75 MG TABS  No current facility-administered medications for this encounter  Facility-Administered Medications Ordered in Other Encounters:   •  lactated ringers infusion, , Intravenous, Continuous PRN, New Bag at 12/21/22 75    Allergies   Allergen Reactions   • 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     Annotation - [id number]: Allergic to nuts, ok with Peanuts and Almonds   • Nuts - Food Allergy Anaphylaxis       Objective     /88   Pulse 60   Temp 97 5 °F (36 4 °C) (Temporal)   Resp 17   Ht 5' 10" (1 778 m)   Wt 136 kg (299 lb 4 8 oz)   SpO2 99%   BMI 42 95 kg/m²       PHYSICAL EXAM    Gen: NAD  Head: NCAT  CV: RRR  CHEST: Clear  ABD: soft, NT/ND  EXT: no edema      ASSESSMENT/PLAN:  This is a 63y o  year old male here for screening colonoscopy, and he is stable and optimized for his procedure

## 2022-12-21 NOTE — ANESTHESIA PREPROCEDURE EVALUATION
Procedure:  COLONOSCOPY    Relevant Problems   ANESTHESIA (within normal limits)   (-) History of anesthesia complications      CARDIO   (+) Essential hypertension   (+) PVC (premature ventricular contraction)   (-) Chest pain   (-) SEAMAN (dyspnea on exertion)      ENDO   (+) Controlled type 2 diabetes mellitus with stage 3 chronic kidney disease, without long-term current use of insulin (HCC)      /RENAL   (+) Benign hypertension with chronic kidney disease, stage III (HCC)   (+) Stage 3a chronic kidney disease (HCC)      NEURO/PSYCH   (+) Anxiety      PULMONARY   (+) STACY (obstructive sleep apnea) (uses CPAP)   (-) Shortness of breath   (-) URI (upper respiratory infection)      Other   (+) BMI 40 0-44 9, adult ([facility] [address] )      TTE 2018:  LEFT VENTRICLE:  Systolic function was at the lower limits of normal  Ejection fraction was estimated to be 50 %  There were no regional wall motion abnormalities  Wall thickness was mildly increased  There was mild concentric hypertrophy      RIGHT VENTRICLE:  The size was normal   Systolic function was normal      LEFT ATRIUM:  The atrium was mildly dilated      MITRAL VALVE:  There was mild regurgitation      AORTIC VALVE:  There was mild regurgitation  Physical Exam    Airway    Mallampati score: II  TM Distance: >3 FB  Neck ROM: full     Dental       Cardiovascular      Pulmonary      Other Findings        Anesthesia Plan  ASA Score- 3     Anesthesia Type- IV sedation with anesthesia with ASA Monitors  Additional Monitors:   Airway Plan:           Plan Factors-Exercise tolerance (METS): >4 METS  Chart reviewed  EKG reviewed  Existing labs reviewed  Patient summary reviewed  Induction- intravenous  Postoperative Plan-     Informed Consent- Anesthetic plan and risks discussed with patient  I personally reviewed this patient with the CRNA  Discussed and agreed on the Anesthesia Plan with the CRNA  Nelly Padilla

## 2022-12-21 NOTE — ANESTHESIA POSTPROCEDURE EVALUATION
Post-Op Assessment Note    CV Status:  Stable  Pain Score: 0    Pain management: adequate     Mental Status:  Arousable   Hydration Status:  Euvolemic and stable   PONV Controlled:  Controlled   Airway Patency:  Patent      Post Op Vitals Reviewed: Yes      Staff: CRNA         No notable events documented      /74 (12/21/22 0755)    Temp (!) 97 4 °F (36 3 °C) (12/21/22 0755)    Pulse 62 (12/21/22 0755)   Resp 17 (12/21/22 0755)    SpO2 97 % (12/21/22 0755)

## 2023-01-09 ENCOUNTER — APPOINTMENT (OUTPATIENT)
Dept: PHYSICAL THERAPY | Facility: REHABILITATION | Age: 63
End: 2023-01-09

## 2023-01-09 DIAGNOSIS — E11.9 TYPE 2 DIABETES MELLITUS WITHOUT COMPLICATION, WITHOUT LONG-TERM CURRENT USE OF INSULIN (HCC): ICD-10-CM

## 2023-01-09 DIAGNOSIS — I10 ESSENTIAL HYPERTENSION: ICD-10-CM

## 2023-01-09 DIAGNOSIS — N18.30 CONTROLLED TYPE 2 DIABETES MELLITUS WITH STAGE 3 CHRONIC KIDNEY DISEASE, WITHOUT LONG-TERM CURRENT USE OF INSULIN (HCC): ICD-10-CM

## 2023-01-09 DIAGNOSIS — N18.30 BENIGN HYPERTENSION WITH CHRONIC KIDNEY DISEASE, STAGE III (HCC): ICD-10-CM

## 2023-01-09 DIAGNOSIS — I12.9 BENIGN HYPERTENSION WITH CHRONIC KIDNEY DISEASE, STAGE III (HCC): ICD-10-CM

## 2023-01-09 DIAGNOSIS — N18.31 STAGE 3A CHRONIC KIDNEY DISEASE (HCC): ICD-10-CM

## 2023-01-09 DIAGNOSIS — E11.22 CONTROLLED TYPE 2 DIABETES MELLITUS WITH STAGE 3 CHRONIC KIDNEY DISEASE, WITHOUT LONG-TERM CURRENT USE OF INSULIN (HCC): ICD-10-CM

## 2023-01-09 RX ORDER — GLIPIZIDE 5 MG/1
5 TABLET ORAL
Qty: 90 TABLET | Refills: 0 | Status: SHIPPED | OUTPATIENT
Start: 2023-01-09

## 2023-01-09 RX ORDER — LOSARTAN POTASSIUM 25 MG/1
25 TABLET ORAL DAILY
Qty: 90 TABLET | Refills: 3 | Status: SHIPPED | OUTPATIENT
Start: 2023-01-09

## 2023-01-09 RX ORDER — AMLODIPINE BESYLATE 10 MG/1
10 TABLET ORAL DAILY
Qty: 90 TABLET | Refills: 0 | Status: SHIPPED | OUTPATIENT
Start: 2023-01-09

## 2023-01-16 ENCOUNTER — APPOINTMENT (OUTPATIENT)
Dept: LAB | Facility: CLINIC | Age: 63
End: 2023-01-16

## 2023-01-16 DIAGNOSIS — N18.30 CONTROLLED TYPE 2 DIABETES MELLITUS WITH STAGE 3 CHRONIC KIDNEY DISEASE, WITHOUT LONG-TERM CURRENT USE OF INSULIN (HCC): ICD-10-CM

## 2023-01-16 DIAGNOSIS — E11.22 CONTROLLED TYPE 2 DIABETES MELLITUS WITH STAGE 3 CHRONIC KIDNEY DISEASE, WITHOUT LONG-TERM CURRENT USE OF INSULIN (HCC): ICD-10-CM

## 2023-01-16 LAB
ANION GAP SERPL CALCULATED.3IONS-SCNC: 3 MMOL/L (ref 4–13)
BASOPHILS # BLD AUTO: 0.03 THOUSANDS/ÂΜL (ref 0–0.1)
BASOPHILS NFR BLD AUTO: 0 % (ref 0–1)
BUN SERPL-MCNC: 19 MG/DL (ref 5–25)
CALCIUM SERPL-MCNC: 9.3 MG/DL (ref 8.3–10.1)
CHLORIDE SERPL-SCNC: 107 MMOL/L (ref 96–108)
CO2 SERPL-SCNC: 29 MMOL/L (ref 21–32)
CREAT SERPL-MCNC: 1.33 MG/DL (ref 0.6–1.3)
EOSINOPHIL # BLD AUTO: 0.18 THOUSAND/ÂΜL (ref 0–0.61)
EOSINOPHIL NFR BLD AUTO: 3 % (ref 0–6)
ERYTHROCYTE [DISTWIDTH] IN BLOOD BY AUTOMATED COUNT: 14.9 % (ref 11.6–15.1)
GFR SERPL CREATININE-BSD FRML MDRD: 57 ML/MIN/1.73SQ M
GLUCOSE P FAST SERPL-MCNC: 108 MG/DL (ref 65–99)
HCT VFR BLD AUTO: 41.7 % (ref 36.5–49.3)
HGB BLD-MCNC: 12.8 G/DL (ref 12–17)
IMM GRANULOCYTES # BLD AUTO: 0.01 THOUSAND/UL (ref 0–0.2)
IMM GRANULOCYTES NFR BLD AUTO: 0 % (ref 0–2)
LYMPHOCYTES # BLD AUTO: 2.74 THOUSANDS/ÂΜL (ref 0.6–4.47)
LYMPHOCYTES NFR BLD AUTO: 40 % (ref 14–44)
MCH RBC QN AUTO: 28.8 PG (ref 26.8–34.3)
MCHC RBC AUTO-ENTMCNC: 30.7 G/DL (ref 31.4–37.4)
MCV RBC AUTO: 94 FL (ref 82–98)
MONOCYTES # BLD AUTO: 0.7 THOUSAND/ÂΜL (ref 0.17–1.22)
MONOCYTES NFR BLD AUTO: 10 % (ref 4–12)
NEUTROPHILS # BLD AUTO: 3.21 THOUSANDS/ÂΜL (ref 1.85–7.62)
NEUTS SEG NFR BLD AUTO: 47 % (ref 43–75)
NRBC BLD AUTO-RTO: 0 /100 WBCS
PLATELET # BLD AUTO: 173 THOUSANDS/UL (ref 149–390)
PMV BLD AUTO: 11.7 FL (ref 8.9–12.7)
POTASSIUM SERPL-SCNC: 4.6 MMOL/L (ref 3.5–5.3)
RBC # BLD AUTO: 4.45 MILLION/UL (ref 3.88–5.62)
SODIUM SERPL-SCNC: 139 MMOL/L (ref 135–147)
WBC # BLD AUTO: 6.87 THOUSAND/UL (ref 4.31–10.16)

## 2023-01-17 LAB
EST. AVERAGE GLUCOSE BLD GHB EST-MCNC: 143 MG/DL
HBA1C MFR BLD: 6.6 %

## 2023-01-18 ENCOUNTER — OFFICE VISIT (OUTPATIENT)
Dept: FAMILY MEDICINE CLINIC | Facility: CLINIC | Age: 63
End: 2023-01-18

## 2023-01-18 VITALS
SYSTOLIC BLOOD PRESSURE: 132 MMHG | DIASTOLIC BLOOD PRESSURE: 78 MMHG | BODY MASS INDEX: 43.81 KG/M2 | OXYGEN SATURATION: 96 % | HEART RATE: 82 BPM | WEIGHT: 306 LBS | RESPIRATION RATE: 16 BRPM | HEIGHT: 70 IN

## 2023-01-18 DIAGNOSIS — E11.22 CONTROLLED TYPE 2 DIABETES MELLITUS WITH STAGE 3 CHRONIC KIDNEY DISEASE, WITHOUT LONG-TERM CURRENT USE OF INSULIN (HCC): ICD-10-CM

## 2023-01-18 DIAGNOSIS — N18.30 CONTROLLED TYPE 2 DIABETES MELLITUS WITH STAGE 3 CHRONIC KIDNEY DISEASE, WITHOUT LONG-TERM CURRENT USE OF INSULIN (HCC): ICD-10-CM

## 2023-01-18 DIAGNOSIS — G47.33 OSA (OBSTRUCTIVE SLEEP APNEA): ICD-10-CM

## 2023-01-18 DIAGNOSIS — Z00.00 ANNUAL PHYSICAL EXAM: Primary | ICD-10-CM

## 2023-01-18 DIAGNOSIS — E66.01 CLASS 3 SEVERE OBESITY DUE TO EXCESS CALORIES WITH SERIOUS COMORBIDITY AND BODY MASS INDEX (BMI) OF 40.0 TO 44.9 IN ADULT (HCC): ICD-10-CM

## 2023-01-18 DIAGNOSIS — Z12.5 SCREENING FOR PROSTATE CANCER: ICD-10-CM

## 2023-01-18 DIAGNOSIS — I10 ESSENTIAL HYPERTENSION: ICD-10-CM

## 2023-01-18 PROBLEM — E66.813 CLASS 3 SEVERE OBESITY DUE TO EXCESS CALORIES WITH SERIOUS COMORBIDITY AND BODY MASS INDEX (BMI) OF 40.0 TO 44.9 IN ADULT (HCC): Status: ACTIVE | Noted: 2018-08-17

## 2023-01-18 NOTE — PROGRESS NOTES
ADULT ANNUAL [address]  [facility]    NAME: Meghan Brady  AGE: 63 y o  SEX: male  : 1960     DATE: 2023     Assessment and Plan:     Problem List Items Addressed This Visit        Endocrine    Controlled type 2 diabetes mellitus with stage 3 chronic kidney disease, without long-term current use of insulin ([address] )     , Discussed with him that his trend of A1c is going up slowly he needs to be on low-carb diet and try to lose weight, referred him to weight management  Lab Results   Component Value Date    HGBA1C 6 6 (H) 2023   stable , cont same med          Relevant Orders    Microalbumin / creatinine urine ratio    Lipid panel    Hemoglobin A1C    Comprehensive metabolic panel    Ambulatory Referral to Weight Management       Respiratory    STACY (obstructive sleep apnea)     Using CPAP         Relevant Orders    Ambulatory Referral to Weight Management       Cardiovascular and Mediastinum    Essential hypertension    Relevant Orders    Lipid panel    Ambulatory Referral to Weight Management       Other    Class 3 severe obesity due to excess calories with serious comorbidity and body mass index (BMI) of 40 0 to 44 9 in adult (HCC)     BMI 43 91 advised to see the weight management and try to lose weight         Relevant Orders    Ambulatory Referral to Weight Management    Annual physical exam - Primary       Immunizations and preventive care screenings were discussed with patient today  Appropriate education was printed on patient's after visit summary  Discussed risks and benefits of prostate cancer screening  We discussed the controversial history of PSA screening for prostate cancer in the [place] as well as the risk of over detection and over treatment of prostate cancer by way of PSA screening    The patient understands that PSA blood testing is an imperfect way to screen for prostate cancer and that elevated PSA levels in the blood may also be caused by infection, inflammation, prostatic trauma or manipulation, urological procedures, or by benign prostatic enlargement  The role of the digital rectal examination in prostate cancer screening was also discussed and I discussed with him that there is large interobserver variability in the findings of digital rectal examination  Counseling:  Alcohol/drug use: discussed moderation in alcohol intake, the recommendations for healthy alcohol use, and avoidance of illicit drug use  Dental Health: discussed importance of regular tooth brushing, flossing, and dental visits  Exercise: the importance of regular exercise/physical activity was discussed  Recommend exercise 3-5 times per week for at least 30 minutes  BMI Counseling: Body mass index is 43 91 kg/m²  The BMI is above normal  Nutrition recommendations include decreasing portion sizes and moderation in carbohydrate intake  Exercise recommendations include exercising 3-5 times per week  Rationale for BMI follow-up plan is due to patient being overweight or obese  Depression Screening and Follow-up Plan: Patient was screened for depression during today's encounter  They screened negative with a PHQ-2 score of 0  Return in about 4 months (around 5/22/2023) for Recheck  Chief Complaint:     Chief Complaint   Patient presents with   • Follow-up   • Physical Exam      History of Present Illness:     Adult Annual Physical   Patient here for a comprehensive physical exam  The patient reports problems - He has hypertension, diabetic, taking all his medication, does exercise and tries to eat healthy  Diet and Physical Activity  Diet/Nutrition: diabetic diet  Exercise: walking        Depression Screening  PHQ-2/9 Depression Screening    Little interest or pleasure in doing things: 0 - not at all  Feeling down, depressed, or hopeless: 0 - not at all  PHQ-2 Score: 0  PHQ-2 Interpretation: Negative depression screen General Health  Sleep: sleeps well  Hearing: normal - bilateral   Vision: no vision problems  Dental: regular dental visits   Health  Symptoms include: none     Review of Systems:     Review of Systems   Constitutional: Negative for activity change, appetite change, chills, fatigue, fever and unexpected weight change  HENT: Negative for congestion, ear discharge, ear pain, nosebleeds, postnasal drip, rhinorrhea, sinus pressure, sneezing, sore throat, trouble swallowing and voice change  Eyes: Negative for photophobia, pain, discharge, redness and itching  Respiratory: Negative for cough, chest tightness, shortness of breath and wheezing  Cardiovascular: Negative for chest pain, palpitations and leg swelling  Gastrointestinal: Positive for constipation  Negative for abdominal pain, diarrhea, nausea and vomiting  Endocrine: Negative for polyuria  Genitourinary: Negative for dysuria, frequency and urgency  Musculoskeletal: Negative for arthralgias, back pain, myalgias and neck pain  Skin: Negative for color change, pallor and rash  Allergic/Immunologic: Negative for environmental allergies and food allergies  Neurological: Negative for dizziness, weakness, light-headedness and headaches  Hematological: Negative for adenopathy  Does not bruise/bleed easily  Psychiatric/Behavioral: Negative for behavioral problems  The patient is not nervous/anxious         Past Medical History:     Past Medical History:   Diagnosis Date   • Anxiety    • BPH (benign prostatic hyperplasia)    • Colon polyp    • CPAP (continuous positive airway pressure) dependence    • [facility]    • Difficulty breathing 01/05/2011   • Hypertension    • Impaired fasting blood sugar     Last Assessed: 1/2/2013   • Obesity    • Sleep apnea       Past Surgical History:     Past Surgical History:   Procedure Laterality Date   • COLONOSCOPY      Fiberoptic; Last Assessed: 10/24/2012      Family History: Family History   Problem Relation Age of Onset   • Heart murmur Mother    • Cerebral palsy Mother    • Heart disease Mother    • Sudden death Mother         Early   • Breast cancer Sister 48   • Pancreatic cancer Maternal Grandmother 61   • Emphysema Maternal Grandfather    • Hypertension Father    • Prostate cancer Father    • Colon cancer Neg Hx    • Anuerysm Neg Hx    • Arrhythmia Neg Hx    • Heart failure Neg Hx    • Clotting disorder Neg Hx       Social History:     Social History     Socioeconomic History   • Marital status: Single     Spouse name: None   • Number of children: None   • Years of education: None   • Highest education level: None   Occupational History   • Occupation: [employer]   Tobacco Use   • Smoking status: Former     Packs/day: 0 25     Years: 30 00     Pack years: 7 50     Types: Cigarettes     Quit date: 2017     Years since quittin 6   • Smokeless tobacco: Never   Vaping Use   • Vaping Use: Never used   Substance and Sexual Activity   • Alcohol use:  Yes     Alcohol/week: 6 0 standard drinks     Types: 6 Standard drinks or equivalent per week     Comment: occ   • Drug use: No   • Sexual activity: Yes     Partners: Female     Comment: no new partners in the past 3months    Other Topics Concern   • None   Social History Narrative    Daily caffeine consumption    Dental care, regularly    Inadequate exercise    Poorly balanced diet     Social Determinants of Health     Financial Resource Strain: Not on file   Food Insecurity: Not on file   Transportation Needs: Not on file   Physical Activity: Not on file   Stress: Not on file   Social Connections: Not on file   Intimate Partner Violence: Not on file   Housing Stability: Not on file      Current Medications:     Current Outpatient Medications   Medication Sig Dispense Refill   • amLODIPine (NORVASC) 10 mg tablet Take 1 tablet (10 mg total) by mouth daily 90 tablet 0   • azelastine (ASTELIN) 0 1 % nasal spray 1 spray into each nostril 2 (two) times a day Use in each nostril as directed 30 mL 0   • clotrimazole (LOTRIMIN) 1 % cream Apply topically 2 (two) times a day 30 g 0   • Empagliflozin (Jardiance) 10 MG TABS tablet Take 1 tablet (10 mg total) by mouth every morning 90 tablet 3   • glipiZIDE (GLUCOTROL) 5 mg tablet Take 1 tablet (5 mg total) by mouth daily with breakfast 90 tablet 0   • losartan (COZAAR) 25 mg tablet Take 1 tablet (25 mg total) by mouth daily 90 tablet 3   • metFORMIN (GLUCOPHAGE) 500 mg tablet Take 1 tablet (500 mg total) by mouth 2 (two) times a day with meals 180 tablet 0   • sildenafil (VIAGRA) 100 mg tablet Take 1 tablet (100 mg total) by mouth daily as needed for erectile dysfunction 30 tablet 3   • linaCLOtide 290 MCG CAPS Take 1 capsule by mouth in the morning 30 capsule 0     No current facility-administered medications for this visit  Allergies: Allergies   Allergen Reactions   • 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     [facility] - [id number]: Allergic to nuts, ok with Peanuts and Almonds   • Nuts - Food Allergy Anaphylaxis      Physical Exam:     /78   Pulse 82   Resp 16   Ht 5' 10" (1 778 m)   Wt (!) 139 kg (306 lb)   SpO2 96%   BMI 43 91 kg/m²     Physical Exam  Vitals and nursing note reviewed  Constitutional:       General: He is not in acute distress  Appearance: Normal appearance  HENT:      Head: Normocephalic and atraumatic  Right Ear: Tympanic membrane and ear canal normal       Left Ear: Tympanic membrane and ear canal normal       Nose: Nose normal  No rhinorrhea  Mouth/Throat:      Mouth: Mucous membranes are moist       Pharynx: Oropharynx is clear  No oropharyngeal exudate or posterior oropharyngeal erythema  Eyes:      Extraocular Movements: Extraocular movements intact  Conjunctiva/sclera: Conjunctivae normal       Pupils: Pupils are equal, round, and reactive to light  Cardiovascular:      Rate and Rhythm: Normal rate and regular rhythm        Heart sounds: No murmur heard  Pulmonary:      Effort: Pulmonary effort is normal       Breath sounds: Normal breath sounds  No wheezing or rales  Abdominal:      General: Abdomen is flat  Bowel sounds are normal  There is no distension  Palpations: Abdomen is soft  There is no mass  Tenderness: There is no abdominal tenderness  There is no guarding  Musculoskeletal:         General: No swelling, tenderness, deformity or signs of injury  Normal range of motion  Cervical back: Normal range of motion and neck supple  Skin:     Coloration: Skin is not jaundiced or pale  Findings: No rash  Neurological:      General: No focal deficit present  Mental Status: He is alert and oriented to person, place, and time  Motor: No weakness  Psychiatric:         Mood and Affect: Mood normal          Behavior: Behavior normal          Thought Content:  Thought content normal          Judgment: Judgment normal           Elsie Loera MD  [place] [id number]

## 2023-01-18 NOTE — PATIENT INSTRUCTIONS
Wellness Visit for Adults   AMBULATORY CARE:   A wellness visit  is when you see your healthcare provider to get screened for health problems  Your healthcare provider will also give you advice on how to stay healthy  Write down your questions so you remember to ask them  Ask your healthcare provider how often you should have a wellness visit  What happens at a wellness visit:  Your healthcare provider will ask about your health, and your family history of health problems  This includes high blood pressure, heart disease, and cancer  He or she will ask if you have symptoms that concern you, if you smoke, and about your mood  You may also be asked about your intake of medicines, supplements, food, and alcohol  Any of the following may be done:  · Your weight  will be checked  Your height may also be checked so your body mass index (BMI) can be calculated  Your BMI shows if you are at a healthy weight  · Your blood pressure  and heart rate will be checked  Your temperature may also be checked  · Blood and urine tests  may be done  Blood tests may be done to check your cholesterol levels  Abnormal cholesterol levels increase your risk for heart disease and stroke  You may also need a blood or urine test to check for diabetes if you are at increased risk  Urine tests may be done to look for signs of an infection or kidney disease  · A physical exam  includes checking your heartbeat and lungs with a stethoscope  Your healthcare provider may also check your skin to look for sun damage  · Screening tests  may be recommended  A screening test is done to check for diseases that may not cause symptoms  The screening tests you may need depend on your age, gender, family history, and lifestyle habits  For example, colorectal screening may be recommended if you are 48years old or older  Screening tests you need if you are a woman:   · A Pap smear  is used to screen for cervical cancer   Pap smears are usually done every 3 to 5 years depending on your age  You may need them more often if you have had abnormal Pap smear test results in the past  Ask your healthcare provider how often you should have a Pap smear  · A mammogram  is an x-ray of your breasts to screen for breast cancer  Experts recommend mammograms every 2 years starting at age 48 years  You may need a mammogram at age 52 years or younger if you have an increased risk for breast cancer  Talk to your healthcare provider about when you should start having mammograms and how often you need them  Vaccines you may need:   · Get an influenza vaccine  every year  The influenza vaccine protects you from the flu  Several types of viruses cause the flu  The viruses change over time, so new vaccines are made each year  · Get a tetanus-diphtheria (Td) booster vaccine  every 10 years  This vaccine protects you against tetanus and diphtheria  Tetanus is a severe infection that may cause painful muscle spasms and lockjaw  Diphtheria is a severe bacterial infection that causes a thick covering in the back of your mouth and throat  · Get a human papillomavirus (HPV) vaccine  if you are female and aged 23 to 32 or male 23 to 24 and never received it  This vaccine protects you from HPV infection  HPV is the most common infection spread by sexual contact  HPV may also cause vaginal, penile, and anal cancers  · Get a pneumococcal vaccine  if you are aged 72 years or older  The pneumococcal vaccine is an injection given to protect you from pneumococcal disease  Pneumococcal disease is an infection caused by pneumococcal bacteria  The infection may cause pneumonia, meningitis, or an ear infection  · Get a shingles vaccine  if you are 60 or older, even if you have had shingles before  The shingles vaccine is an injection to protect you from the varicella-zoster virus  This is the same virus that causes chickenpox   Shingles is a painful rash that develops in people who had chickenpox or have been exposed to the virus  How to eat healthy:  My Plate is a model for planning healthy meals  It shows the types and amounts of foods that should go on your plate  Fruits and vegetables make up about half of your plate, and grains and protein make up the other half  A serving of dairy is included on the side of your plate  The amount of calories and serving sizes you need depends on your age, gender, weight, and height  Examples of healthy foods are listed below:  · Eat a variety of vegetables  such as dark green, red, and orange vegetables  You can also include canned vegetables low in sodium (salt) and frozen vegetables without added butter or sauces  · Eat a variety of fresh fruits , canned fruit in 100% juice, frozen fruit, and dried fruit  · Include whole grains  At least half of the grains you eat should be whole grains  Examples include whole-wheat bread, wheat pasta, brown rice, and whole-grain cereals such as oatmeal     · Eat a variety of protein foods such as seafood (fish and shellfish), lean meat, and poultry without skin (turkey and chicken)  Examples of lean meats include pork leg, shoulder, or tenderloin, and beef round, sirloin, tenderloin, and extra lean ground beef  Other protein foods include eggs and egg substitutes, beans, peas, soy products, nuts, and seeds  · Choose low-fat dairy products such as skim or 1% milk or low-fat yogurt, cheese, and cottage cheese  · Limit unhealthy fats  such as butter, hard margarine, and shortening  Exercise:  Exercise at least 30 minutes per day on most days of the week  Some examples of exercise include walking, biking, dancing, and swimming  You can also fit in more physical activity by taking the stairs instead of the elevator or parking farther away from stores  Include muscle strengthening activities 2 days each week  Regular exercise provides many health benefits   It helps you manage your weight, and decreases your risk for type 2 diabetes, heart disease, stroke, and high blood pressure  Exercise can also help improve your mood  Ask your healthcare provider about the best exercise plan for you  General health and safety guidelines:   · Do not smoke  Nicotine and other chemicals in cigarettes and cigars can cause lung damage  Ask your healthcare provider for information if you currently smoke and need help to quit  E-cigarettes or smokeless tobacco still contain nicotine  Talk to your healthcare provider before you use these products  · Limit alcohol  A drink of alcohol is 12 ounces of beer, 5 ounces of wine, or 1½ ounces of liquor  · Lose weight, if needed  Being overweight increases your risk of certain health conditions  These include heart disease, high blood pressure, type 2 diabetes, and certain types of cancer  · Protect your skin  Do not sunbathe or use tanning beds  Use sunscreen with a SPF 15 or higher  Apply sunscreen at least 15 minutes before you go outside  Reapply sunscreen every 2 hours  Wear protective clothing, hats, and sunglasses when you are outside  · Drive safely  Always wear your seatbelt  Make sure everyone in your car wears a seatbelt  A seatbelt can save your life if you are in an accident  Do not use your cell phone when you are driving  This could distract you and cause an accident  Pull over if you need to make a call or send a text message  · Practice safe sex  Use latex condoms if are sexually active and have more than one partner  Your healthcare provider may recommend screening tests for sexually transmitted infections (STIs)  · Wear helmets, lifejackets, and protective gear  Always wear a helmet when you ride a bike or motorcycle, go skiing, or play sports that could cause a head injury  Wear protective equipment when you play sports  Wear a lifejacket when you are on a boat or doing water sports      © Copyright [employer] 2022 Information is for End User's use only and may not be sold, redistributed or otherwise used for commercial purposes  All illustrations and images included in CareNotes® are the copyrighted property of A D A M [employer] Inc  or Felipa Mahmood  The above information is an  only  It is not intended as medical advice for individual conditions or treatments  Talk to your doctor, nurse or pharmacist before following any medical regimen to see if it is safe and effective for you  Weight Management   AMBULATORY CARE:   Why it is important to manage your weight:  Being overweight increases your risk of health conditions such as heart disease, high blood pressure, type 2 diabetes, and certain types of cancer  It can also increase your risk for osteoarthritis, sleep apnea, and other respiratory problems  Aim for a slow, steady weight loss  Even a small amount of weight loss can lower your risk of health problems  Risks of being overweight:  Extra weight can cause many health problems, including the following:  · Diabetes (high blood sugar level)    · High blood pressure or high cholesterol    · Heart disease    · Stroke    · Gallbladder or liver disease    · Cancer of the colon, breast, prostate, liver, or kidney    · Sleep apnea    · Arthritis or gout    Screening  is done to check for health conditions before you have signs or symptoms  If you are 28to 79years old, your blood sugar level may be checked every 3 years for signs of prediabetes or diabetes  Your healthcare provider will check your blood pressure at each visit  High blood pressure can lead to a stroke or other problems  Your provider may check for signs of heart disease, cancer, or other health problems  How to lose weight safely:  A safe and healthy way to lose weight is to eat fewer calories and get regular exercise  · You can lose up about 1 pound a week by decreasing the number of calories you eat by 500 calories each day   You can decrease calories by eating smaller portion sizes or by cutting out high-calorie foods  Read labels to find out how many calories are in the foods you eat  · You can also burn calories with exercise such as walking, swimming, or biking  You will be more likely to keep weight off if you make these changes part of your lifestyle  Exercise at least 30 minutes per day on most days of the week  You can also fit in more physical activity by taking the stairs instead of the elevator or parking farther away from stores  Ask your healthcare provider about the best exercise plan for you  Healthy meal plan for weight management:  A healthy meal plan includes a variety of foods, contains fewer calories, and helps you stay healthy  A healthy meal plan includes the following:     · Eat whole-grain foods more often  A healthy meal plan should contain fiber  Fiber is the part of grains, fruits, and vegetables that is not broken down by your body  Whole-grain foods are healthy and provide extra fiber in your diet  Some examples of whole-grain foods are whole-wheat breads and pastas, oatmeal, brown rice, and bulgur  · Eat a variety of vegetables every day  Include dark, leafy greens such as spinach, kale, gaye greens, and mustard greens  Eat yellow and orange vegetables such as carrots, sweet potatoes, and winter squash  · Eat a variety of fruits every day  Choose fresh or canned fruit (canned in its own juice or light syrup) instead of juice  Fruit juice has very little or no fiber  · Eat low-fat dairy foods  Drink fat-free (skim) milk or 1% milk  Eat fat-free yogurt and low-fat cottage cheese  Try low-fat cheeses such as mozzarella and other reduced-fat cheeses  · Choose meat and other protein foods that are low in fat  Choose beans or other legumes such as split peas or lentils  Choose fish, skinless poultry (chicken or turkey), or lean cuts of red meat (beef or pork)   Before you cook meat or poultry, cut off any visible fat  · Use less fat and oil  Try baking foods instead of frying them  Add less fat, such as margarine, sour cream, regular salad dressing and mayonnaise to foods  Eat fewer high-fat foods  Some examples of high-fat foods include french fries, doughnuts, ice cream, and cakes  · Eat fewer sweets  Limit foods and drinks that are high in sugar  This includes candy, cookies, regular soda, and sweetened drinks  Ways to decrease calories:   · Eat smaller portions  ? Use a small plate with smaller servings  ? Do not eat second helpings  ? When you eat at a restaurant, ask for a box and place half of your meal in the box before you eat  ? Share an entrée with someone else  · Replace high-calorie snacks with healthy, low-calorie snacks  ? Choose fresh fruit, vegetables, fat-free rice cakes, or air-popped popcorn instead of potato chips, nuts, or chocolate  ? Choose water or calorie-free drinks instead of soda or sweetened drinks  · Do not shop for groceries when you are hungry  You may be more likely to make unhealthy food choices  Take a grocery list of healthy foods and shop after you have eaten  · Eat regular meals  Do not skip meals  Skipping meals can lead to overeating later in the day  This can make it harder for you to lose weight  Eat a healthy snack in place of a meal if you do not have time to eat a regular meal  Talk with a dietitian to help you create a meal plan and schedule that is right for you  Other things to consider as you try to lose weight:   · Be aware of situations that may give you the urge to overeat, such as eating while watching television  Find ways to avoid these situations  For example, read a book, go for a walk, or do crafts  · Meet with a weight loss support group or friends who are also trying to lose weight  This may help you stay motivated to continue working on your weight loss goals      © Copyright [employer] 2022 Information is for End User's use only and may not be sold, redistributed or otherwise used for commercial purposes  All illustrations and images included in CareNotes® are the copyrighted property of A D A M , Inc  or Felipa Mahmood  The above information is an  only  It is not intended as medical advice for individual conditions or treatments  Talk to your doctor, nurse or pharmacist before following any medical regimen to see if it is safe and effective for you

## 2023-01-18 NOTE — ASSESSMENT & PLAN NOTE
, Discussed with him that his trend of A1c is going up slowly he needs to be on low-carb diet and try to lose weight, referred him to weight management  Lab Results   Component Value Date    HGBA1C 6 6 (H) 01/16/2023   stable , cont same med

## 2023-01-30 ENCOUNTER — OFFICE VISIT (OUTPATIENT)
Dept: PHYSICAL THERAPY | Facility: REHABILITATION | Age: 63
End: 2023-01-30

## 2023-01-30 DIAGNOSIS — N39.41 URGE INCONTINENCE OF URINE: Primary | ICD-10-CM

## 2023-01-30 NOTE — PROGRESS NOTES
PT Re-Evaluation     Today's date: 2023  Patient name: Germaine Souza  : 1960  MRN: [id number]  Referring provider: Kaylah Locke*  Dx:   Encounter Diagnosis     ICD-10-CM    1  Urge incontinence of urine  [id number]           Start Time: 1200  Stop Time: 1250  Total time in clinic (min): 50 minutes    Assessment  Assessment details: The patient is a 63 y o  male with complaints of urinary frequency and urge incontinence of urine which began about one year ago  His history includes Diabetes Mellitus Type II, Chronic Kidney Disease (stage 3a), and sleep apnea  He also experiences continued constipation with reduced frequency of bowel movements and small stools  He follows up with GI and Nephrology  He has been treated for 3 visits including today  He was last seen on 22  Education/counseling and Biofeedback performed today  He continues to experience some urge incontinence  He is compliant with HEP  He does have overall good awareness of his pelvic floor as well as ability to recruit his pelvic floor muscles well  Coordination improved with cueing  He does have limited endurance  Did work on [employer] and he will call with any questions/concerns as he will return in one month for next follow up  He would benefit from pelvic floor physical therapy to help reduce/manage pain and symptoms, address impairments and maximize function and quality of life upon discharge  He will be given updated HEP throughout episode of care  Thank you for the referral     Therapeutic activities performed upon included education, explanation/review of exam findings and progress and discussion of treatment plan and exercises as well as expectations of the patient to emphasize the importance of compliance and adherence to physical therapy visits            Impairments: abnormal coordination, abnormal or restricted ROM, activity intolerance, impaired physical strength, lacks appropriate home exercise program, pain with function, poor posture  and poor body mechanics    Goals  General:  ST  The patient will reduce urinary urgency by 25-50% frequency in 12 weeks  - not met  2  The patient will be able to isolate pelvic floor muscles to perform a proper contraction in 4 to 6 weeks  - met  3  The patient will be able to fully relax his pelvic floor muscles and demonstrate good coordination in 8 weeks  - improving, partially met    LT  The patient will demonstrate improved bladder control with minimal to no urgency/frequency episodes upon discharge  2  The patient will be independent with HEP upon discharge  3  The patient will control her bladder to get to the bathroom without leakage upon discharge  Plan  Plan details: Once a month due to high copay   Patient would benefit from: skilled physical therapy  Planned modality interventions: biofeedback  Planned therapy interventions: abdominal trunk stabilization, behavior modification, body mechanics training, breathing training, coordination, flexibility, IADL retraining, manual therapy, neuromuscular re-education, patient education, postural training, self care, strengthening, stretching, therapeutic activities, functional ROM exercises and home exercise program  Frequency: 1x week  Duration in visits: 3  Duration in weeks: 12  Plan of Care beginning date: 2023  Plan of Care expiration date: 2023  Treatment plan discussed with: patient        PT Pelvic Floor Subjective:   History of Present Illness: The patient did have his colonoscopy in December and notes that it went well  He does not have to have a repeat colonoscopy for 5 days  He notes that after he was cleaned out he noticed an improvement in his bladder symptoms  He notes that he continues to experience constipation  He does have a bowel movement once every 4 days and they are small and broken  He notes that he does feel bloated with the constipation   He is seeing Urology and he did try a prescription medication for his bladder symptoms but this did not help  He does not feel that there have been any changes in his bladder symptoms and he is frustrated  He has been compliant with his exercises in PT  He follows up with Urology in 2023  Social Support:     Work status: employed full time (front end ; 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)  Bladder Function:     Voiding Difficulties negative for: urgency, frequent urination, straining (stop/go stream at times) and incomplete emptying      Voiding Difficulties comments:     Urinary frequency: 2-3 hours  Urinary leakage: urine leakage (+ urge incontinence)    Urinary leakage aggravated by: walking to the bathroom and key-in-the-door syndrome    Urinary leakage not aggravated by: coughing and sneezing    Nocturia (episodes per night): 3 and 2    Fluid Intake Type:  Tea, water and coffee    Intake (ounces):      Intake (ounces) comment: Iced Tea or Iced Coffee: cut back; on occasion  Celsius: sparkling water/energy drink:1-2 a day;  8-10 ounce can a day (zero sugar)  Coffee: 1 cup in the morning (black no sugar or cream)  Water: 16-32 ounces a day (while at work)  Occasional soda     Incontinence Management:     Pads/Diapers Additional Comments: wears a pad on occasion if going out and about  Bowel Function:     Voiding DIfficulties: painful defecating, unfinished feeling after defecating and constipation      Bowel Function comments:  Patient is taking Dulcalax daily; he notes that this will take effect usually later the next day  BM frequency is once a week or less  Constipation started about one year ago  Enema did not give patient any relief in the hospital one year ago  Jimmie Glass but did not tolerate well  Patient notes that he is bloated and gassy    No hemorrhoids    Bowel frequency: more than every 4 days    [place] Stool Scale: type 1  Pain:     Current pain ratin    At worst pain ratin      Objective   Pelvic Floor Exam     General Perineum Exam:     General perineum exam comments: Pelvic floor verbal consent and written consent signed and in chart  Patient deferred second person in room: YES/NO    Education provided today:   Time Spent on Patient Education:    Pelvic floor anatomy and function  Physiology/relationship of abdominal canister and pelvis/pelvic organs/pelvic floor muscles  Diaphragm and Diaphragmatic breathing  Bowel and Bladder anatomy and function    Pelvic Organ Prolapse - explanation and discussion of management of symptoms  Importance of body mechanics and ergonomics in regards to protecting against activities which increase IAP and pressure    Toileting posture and body mechanics  Constipation Education  Pelvic and chronic pain education  "The Poo in You" Educational video (peds and family) on [email or web address]    Hormonal and MSK changes during pregnancy   Post partum posture and MSK changes  Hormonal changes during and after menopause  Surgical and post op considerations  PT exam and course of treatment  Bladder and Bowel diary     Future Education To be Provided:     Visual Inspection of Perineum:   Excursion of perineal body in cephalad direction with contraction of pelvic floor muscles (PFM): good    SMEG Biofeedback   Sensor used: external sensors placed perianally    power protocol   Secs work/Secs rest/Reps: 2/4  Max achieved (microvolts): 15  Resting average (microvolts): 2  Working average (microvolts): 12  Recruitment: brisk  Holding ability: fair  Derecruitment: goodwithin normal limits  Biofeedback comments: Patient was assessed in seated and standing  Patient had difficulty with holding contractions - endurance 2-3 seconds max  Overall good awareness and muscle recruitment  No increased tension at rest               Precautions: DM Type II; chronic kidney disease;   [employer] HEP    Manuals 11/21 12/5 1/30                                                              Neuro Re-Ed             Diaphragmatic Breathing             TA ADIM             TA + PFMC                          Biofeedback sEMG  30 min total           PFMC: Slow Holds  2x10           PFMC: Quick Flicks  98L supine           PFMC + hip abd isometrics             PFMC + hip add isometrics             PFMC + ecc bridge             PFMC in sitting  10x QF's    5x SH's 2x10 QF's    10x SH's          PFMC seated + tilt             PFMC in standing   10x QF's  10x SH's          PFMC in Quadruped                                                                 Ther Ex             PPT             Bridge + SLR             Bridge + marching                                                                              Ther Activity             EDUCATION  15 min 25 min including urge suppresion technique          PFMC + reach             PFMC + squat             PFMC + lift             PFMC + sit to stand             PFMC + step up/down             Gait Training                                       Modalities

## 2023-02-06 ENCOUNTER — OFFICE VISIT (OUTPATIENT)
Dept: DENTISTRY | Facility: CLINIC | Age: 63
End: 2023-02-06

## 2023-02-06 VITALS — DIASTOLIC BLOOD PRESSURE: 85 MMHG | SYSTOLIC BLOOD PRESSURE: 150 MMHG | HEART RATE: 79 BPM

## 2023-02-06 DIAGNOSIS — Z01.20 ENCOUNTER FOR DENTAL EXAM AND CLEANING W/O ABNORMAL FINDINGS: Primary | ICD-10-CM

## 2023-02-06 PROBLEM — K05.30 PERIODONTITIS: Status: ACTIVE | Noted: 2023-02-06

## 2023-02-06 NOTE — DENTAL PROCEDURE DETAILS
COMP EXAM, FMX, PROBE EXAM   REVIEWED MED HX: meds, allergies, health changes reviewed in EPIC  CHIEF CONCERN:    -pt concerned with gum bleeding when brushing  - pt reports believes some teeth mobile  - no current dental pain  - last dental visit several years ago  -pt asked re: lower bumps on lower  (tamela present/ see note below)  PAIN SCALE:  0  ASA CLASS:  III  PLAQUE:  heavy   CALCULUS: heavy   BLEEDING:  Bleeding upon probing  STAIN :l light   ORAL HYGIENE:  poor  PERIO: stage 3/ grade B, probe exam completed  Visual and Tactile Intraoral/ Extraoral evaluation: Oral and Oropharyngeal cancer evaluation  No findings  Bilateral mandibular tamela present and normal anatomy  Dr Casandra Alcantara exam=   Reviewed with patient clinical and radiographic findings and patient verbalized understanding  All questions and concerns addressed  Dr informed pt of periodontitis ( mod- severe)  Dr recommended sc/rp  Showed pt bone loss on radiographs  Discussed perio disease / Diabetes correlation  Pt informed that he will require 4 visits of sc/rp after pre-auth   And maintenance visits every 3-4 months to keep perio disease stable     REFERRALS: no referrals needed    CARIES FINDINGS: no decay present/ no restorations present       NEXT VISIT:   1) sc/rp UR- 60 min  2) sc/rp LR- 60 min  3) sc/rp UL- 60 min  4) sc/rp LL- 60 min    Last FMX : 2/6/23

## 2023-02-06 NOTE — PROGRESS NOTES
COMP EXAM, FMX, PROBE EXAM   REVIEWED MED HX: meds, allergies, health changes reviewed in EPIC  CHIEF CONCERN:    -pt concerned with gum bleeding when brushing  - pt reports believes some teeth mobile  - no current dental pain  - last dental visit several years ago  -pt asked re: lower bumps on lower  (tamela present/ see note below)  PAIN SCALE:  0  ASA CLASS:  III  PLAQUE:  heavy   CALCULUS: heavy   BLEEDING:  Bleeding upon probing  STAIN :l light   ORAL HYGIENE:  poor  PERIO:     Visual and Tactile Intraoral/ Extraoral evaluation: Oral and Oropharyngeal cancer evaluation  No findings  Bilateral mandibular tamela present and normal anatomy  Dr Roni Mosquera exam=   Reviewed with patient clinical and radiographic findings and patient verbalized understanding  All questions and concerns addressed   informed pt of periodontitis ( mod- severe)   Dr recommended sc/rp    REFERRALS: no referrals needed    CARIES FINDINGS:        NEXT VISIT:   1)    Last FMX : 2/6/23

## 2023-03-29 DIAGNOSIS — N18.31 STAGE 3A CHRONIC KIDNEY DISEASE (HCC): ICD-10-CM

## 2023-03-29 DIAGNOSIS — E11.22 CONTROLLED TYPE 2 DIABETES MELLITUS WITH STAGE 3 CHRONIC KIDNEY DISEASE, WITHOUT LONG-TERM CURRENT USE OF INSULIN (HCC): ICD-10-CM

## 2023-03-29 DIAGNOSIS — N18.30 CONTROLLED TYPE 2 DIABETES MELLITUS WITH STAGE 3 CHRONIC KIDNEY DISEASE, WITHOUT LONG-TERM CURRENT USE OF INSULIN (HCC): ICD-10-CM

## 2023-04-05 ENCOUNTER — TELEPHONE (OUTPATIENT)
Dept: NEPHROLOGY | Facility: CLINIC | Age: 63
End: 2023-04-05

## 2023-04-05 NOTE — TELEPHONE ENCOUNTER
Called and spoke with [employer] to complete their bloodwork prior to their appointment on 4/11 with Dr Lakeshia Solo at the [facility]

## 2023-05-24 ENCOUNTER — APPOINTMENT (OUTPATIENT)
Dept: LAB | Facility: CLINIC | Age: 63
End: 2023-05-24

## 2023-05-24 DIAGNOSIS — N18.30 CONTROLLED TYPE 2 DIABETES MELLITUS WITH STAGE 3 CHRONIC KIDNEY DISEASE, WITHOUT LONG-TERM CURRENT USE OF INSULIN (HCC): ICD-10-CM

## 2023-05-24 DIAGNOSIS — E11.22 CONTROLLED TYPE 2 DIABETES MELLITUS WITH STAGE 3 CHRONIC KIDNEY DISEASE, WITHOUT LONG-TERM CURRENT USE OF INSULIN (HCC): ICD-10-CM

## 2023-05-24 LAB
ALBUMIN SERPL BCP-MCNC: 3.6 G/DL (ref 3.5–5)
ALP SERPL-CCNC: 67 U/L (ref 46–116)
ALT SERPL W P-5'-P-CCNC: 28 U/L (ref 12–78)
ANION GAP SERPL CALCULATED.3IONS-SCNC: 1 MMOL/L (ref 4–13)
AST SERPL W P-5'-P-CCNC: 26 U/L (ref 5–45)
BILIRUB SERPL-MCNC: 0.63 MG/DL (ref 0.2–1)
BUN SERPL-MCNC: 15 MG/DL (ref 5–25)
CALCIUM SERPL-MCNC: 9.2 MG/DL (ref 8.3–10.1)
CHLORIDE SERPL-SCNC: 109 MMOL/L (ref 96–108)
CO2 SERPL-SCNC: 27 MMOL/L (ref 21–32)
CREAT SERPL-MCNC: 1.42 MG/DL (ref 0.6–1.3)
CREAT UR-MCNC: 140 MG/DL
EST. AVERAGE GLUCOSE BLD GHB EST-MCNC: 146 MG/DL
GFR SERPL CREATININE-BSD FRML MDRD: 52 ML/MIN/1.73SQ M
GLUCOSE P FAST SERPL-MCNC: 137 MG/DL (ref 65–99)
HBA1C MFR BLD: 6.7 %
MICROALBUMIN UR-MCNC: 25.8 MG/L (ref 0–20)
MICROALBUMIN/CREAT 24H UR: 18 MG/G CREATININE (ref 0–30)
POTASSIUM SERPL-SCNC: 4 MMOL/L (ref 3.5–5.3)
PROT SERPL-MCNC: 7.9 G/DL (ref 6.4–8.4)
SODIUM SERPL-SCNC: 137 MMOL/L (ref 135–147)

## 2023-05-31 ENCOUNTER — OFFICE VISIT (OUTPATIENT)
Dept: FAMILY MEDICINE CLINIC | Facility: CLINIC | Age: 63
End: 2023-05-31

## 2023-05-31 VITALS
SYSTOLIC BLOOD PRESSURE: 140 MMHG | OXYGEN SATURATION: 98 % | DIASTOLIC BLOOD PRESSURE: 70 MMHG | WEIGHT: 311 LBS | HEART RATE: 74 BPM | HEIGHT: 70 IN | RESPIRATION RATE: 16 BRPM | BODY MASS INDEX: 44.52 KG/M2

## 2023-05-31 DIAGNOSIS — E11.22 CONTROLLED TYPE 2 DIABETES MELLITUS WITH STAGE 3 CHRONIC KIDNEY DISEASE, WITHOUT LONG-TERM CURRENT USE OF INSULIN (HCC): Primary | ICD-10-CM

## 2023-05-31 DIAGNOSIS — N18.30 CONTROLLED TYPE 2 DIABETES MELLITUS WITH STAGE 3 CHRONIC KIDNEY DISEASE, WITHOUT LONG-TERM CURRENT USE OF INSULIN (HCC): Primary | ICD-10-CM

## 2023-05-31 DIAGNOSIS — I12.9 BENIGN HYPERTENSION WITH CHRONIC KIDNEY DISEASE, STAGE III (HCC): ICD-10-CM

## 2023-05-31 DIAGNOSIS — E11.9 TYPE 2 DIABETES MELLITUS WITHOUT COMPLICATION, WITHOUT LONG-TERM CURRENT USE OF INSULIN (HCC): ICD-10-CM

## 2023-05-31 DIAGNOSIS — K59.00 CONSTIPATION, UNSPECIFIED CONSTIPATION TYPE: ICD-10-CM

## 2023-05-31 DIAGNOSIS — N18.30 BENIGN HYPERTENSION WITH CHRONIC KIDNEY DISEASE, STAGE III (HCC): ICD-10-CM

## 2023-05-31 DIAGNOSIS — I10 ESSENTIAL HYPERTENSION: ICD-10-CM

## 2023-05-31 RX ORDER — AMLODIPINE BESYLATE 10 MG/1
10 TABLET ORAL DAILY
Qty: 90 TABLET | Refills: 3 | Status: SHIPPED | OUTPATIENT
Start: 2023-05-31

## 2023-05-31 RX ORDER — GLIPIZIDE 5 MG/1
5 TABLET ORAL
Qty: 90 TABLET | Refills: 3 | Status: SHIPPED | OUTPATIENT
Start: 2023-05-31

## 2023-05-31 RX ORDER — LOSARTAN POTASSIUM 25 MG/1
25 TABLET ORAL DAILY
Qty: 90 TABLET | Refills: 3 | Status: SHIPPED | OUTPATIENT
Start: 2023-05-31

## 2023-05-31 NOTE — ASSESSMENT & PLAN NOTE
Lab Results   Component Value Date    HGBA1C 6 7 (H) 05/24/2023   ' trend is going up, advised to take his medicine on regular basis as he restarted Sergo Collinin lately after seeing the nephrologist hopefully his sugar will be better next time, advised about more fiber low-carb diet

## 2023-05-31 NOTE — ASSESSMENT & PLAN NOTE
Lab Results   Component Value Date    CREATININE 1 42 (H) 05/24/2023    CREATININE 1 43 (H) 04/10/2023    CREATININE 1 33 (H) 01/16/2023    EGFR 52 05/24/2023    EGFR 52 04/10/2023    EGFR 57 01/16/2023   Creatinine remains 1 3-1 4, advised to avoid NSAIDs

## 2023-05-31 NOTE — PROGRESS NOTES
Name: Kervin Kennedy      : 1960      MRN: [id number]  Encounter Provider: Deidre Thorpe MD  Encounter Date: 2023   Encounter department: [facility] [address]     1  Controlled type 2 diabetes mellitus with stage 3 chronic kidney disease, without long-term current use of insulin ([facility])  Assessment & Plan:    Lab Results   Component Value Date    HGBA1C 6 7 (H) 2023   ' trend is going up, advised to take his medicine on regular basis as he restarted Madeline Fan lately after seeing the nephrologist hopefully his sugar will be better next time, advised about more fiber low-carb diet     Orders:  -     Ambulatory Referral to Weight Management; Future  -     Hemoglobin A1C; Future; Expected date: 2023  -     Comprehensive metabolic panel; Future; Expected date: 2023  -     CBC and Platelet; Future; Expected date: 2023  -     TSH, 3rd generation with Free T4 reflex; Future; Expected date: 2023  -     Lipid Panel with Direct LDL reflex; Future; Expected date: 2023    2  Benign hypertension with chronic kidney disease, stage III [facility])  Assessment & Plan:  Lab Results   Component Value Date    CREATININE 1 42 (H) 2023    CREATININE 1 43 (H) 04/10/2023    CREATININE 1 33 (H) 2023    EGFR 52 2023    EGFR 52 04/10/2023    EGFR 57 2023   Creatinine remains 1 3-1 4, advised to avoid NSAIDs    Orders:  -     losartan (COZAAR) 25 mg tablet; Take 1 tablet (25 mg total) by mouth daily  -     Comprehensive metabolic panel; Future; Expected date: 2023  -     CBC and Platelet; Future; Expected date: 2023  -     TSH, 3rd generation with Free T4 reflex; Future; Expected date: 2023  -     Lipid Panel with Direct LDL reflex; Future; Expected date: 2023    3   Constipation, unspecified constipation type  Assessment & Plan:  He has long-term constipation issue, he was seen by gastroenterologist in the past and had colonoscopy but never addressed the constipation issue, discussed about high-fiber diet more fluid    Orders:  -     Ambulatory Referral to Gastroenterology; Future  -     TSH, 3rd generation with Free T4 reflex; Future; Expected date: 09/28/2023    4  Body mass index (BMI) of 40 0 to 44 9 in adult [facility])  -     Ambulatory Referral to Weight Management; Future    5  Type 2 diabetes mellitus without complication, without long-term current use of insulin [facility])  Assessment & Plan:    Lab Results   Component Value Date    HGBA1C 6 7 (H) 05/24/2023   ' trend is going up, advised to take his medicine on regular basis as he restarted Rand Fuse lately after seeing the nephrologist hopefully his sugar will be better next time, advised about more fiber low-carb diet     Orders:  -     glipiZIDE (GLUCOTROL) 5 mg tablet; Take 1 tablet (5 mg total) by mouth daily with breakfast    6  Essential hypertension  -     amLODIPine (NORVASC) 10 mg tablet; Take 1 tablet (10 mg total) by mouth daily           Subjective     Follow-up on diabetes, hypertension he has seen the nephrologist for chronic kidney disease, he says he is out of losartan and needs a refill, he just restarted the Jardiance, he has gained weight, he says he has long-term constipation issue and he wants to see the GI, as he says when he is more constipated he has urine problem 2 and he went to urologist and medicine does not help but once his constipation it is relieved he feels better with his urination too  Review of Systems   Constitutional: Negative for activity change, appetite change, chills, fatigue, fever and unexpected weight change  HENT: Negative for congestion, ear discharge, ear pain, nosebleeds, postnasal drip, rhinorrhea, sinus pressure, sneezing, sore throat, trouble swallowing and voice change  Eyes: Negative for photophobia, pain, discharge, redness and itching     Respiratory: Negative for cough, chest tightness, shortness of breath and wheezing  Cardiovascular: Negative for chest pain, palpitations and leg swelling  Gastrointestinal: Positive for constipation  Negative for abdominal pain, diarrhea, nausea and vomiting  Endocrine: Negative for polyuria  Genitourinary: Negative for dysuria, frequency and urgency  Musculoskeletal: Negative for arthralgias, back pain, myalgias and neck pain  Skin: Negative for color change, pallor and rash  Allergic/Immunologic: Negative for environmental allergies and food allergies  Neurological: Negative for dizziness, weakness, light-headedness and headaches  Hematological: Negative for adenopathy  Does not bruise/bleed easily  Psychiatric/Behavioral: Negative for behavioral problems  The patient is not nervous/anxious          Past Medical History:   Diagnosis Date   • Anxiety    • BPH (benign prostatic hyperplasia)    • Colon polyp    • CPAP (continuous positive airway pressure) dependence    • [facility]    • Difficulty breathing 2011   • Hypertension    • Impaired fasting blood sugar     Last Assessed: 2013   • Obesity    • Sleep apnea      Past Surgical History:   Procedure Laterality Date   • COLONOSCOPY      Fiberoptic; Last Assessed: 10/24/2012     Family History   Problem Relation Age of Onset   • Heart murmur Mother    • Cerebral palsy Mother    • Heart disease Mother    • Sudden death Mother         Early   • Cancer Mother    • Hypertension Mother            • Breast cancer Sister 48   • Pancreatic cancer Maternal Grandmother 61   • Emphysema Maternal Grandfather    • Hypertension Father    • Prostate cancer Father    • Cancer Father    • Colon cancer Neg Hx    • Anuerysm Neg Hx    • Arrhythmia Neg Hx    • Heart failure Neg Hx    • Clotting disorder Neg Hx      Social History     Socioeconomic History   • Marital status: Single     Spouse name: None   • Number of children: None   • Years of education: None   • Highest education level: None Occupational History   • Occupation: [employer]   Tobacco Use   • Smoking status: Former     Packs/day: 0 25     Years: 30 00     Total pack years: 7 50     Types: Cigarettes     Start date: 3/1/1981     Quit date: 2017     Years since quittin 0   • Smokeless tobacco: Never   Vaping Use   • Vaping Use: Never used   Substance and Sexual Activity   • Alcohol use: Yes     Alcohol/week: 12 0 standard drinks of alcohol     Types: 6 Cans of beer, 6 Standard drinks or equivalent per week     Comment: Social   • Drug use: No   • Sexual activity: Yes     Partners: Female     Birth control/protection: Condom Male     Comment: no new partners in the past 3months    Other Topics Concern   • None   Social History Narrative    Daily caffeine consumption    Dental care, regularly    Inadequate exercise    Poorly balanced diet     Social Determinants of Health     Financial Resource Strain: Medium Risk (2023)    Overall Financial Resource Strain (CARDIA)    • Difficulty of Paying Living Expenses: Somewhat hard   Food Insecurity: Food Insecurity Present (2023)    Hunger Vital Sign    • Worried About Running Out of Food in the Last Year: Sometimes true    • Ran Out of Food in the Last Year: Sometimes true   Transportation Needs: No Transportation Needs (2023)    PRAPARE - Transportation    • Lack of Transportation (Medical): No    • Lack of Transportation (Non-Medical):  No   Physical Activity: Not on file   Stress: Not on file   Social Connections: Not on file   Intimate Partner Violence: Not on file   Housing Stability: Not on file     Current Outpatient Medications on File Prior to Visit   Medication Sig   • azelastine (ASTELIN) 0 1 % nasal spray 1 spray into each nostril 2 (two) times a day Use in each nostril as directed   • Cholecalciferol (Vitamin D3) 50 MCG (2000 UT) TABS Take 1 tablet (2,000 Units total) by mouth daily   • clotrimazole (LOTRIMIN) 1 % cream Apply topically 2 (two) times a day   • "Empagliflozin (Jardiance) 10 MG TABS tablet Take 1 tablet (10 mg total) by mouth every morning   • linaCLOtide 290 MCG CAPS Take 1 capsule by mouth in the morning   • metFORMIN (GLUCOPHAGE) 500 mg tablet Take 1 tablet (500 mg total) by mouth 2 (two) times a day with meals   • sildenafil (VIAGRA) 100 mg tablet Take 1 tablet (100 mg total) by mouth daily as needed for erectile dysfunction   • [DISCONTINUED] amLODIPine (NORVASC) 10 mg tablet Take 1 tablet (10 mg total) by mouth daily   • [DISCONTINUED] glipiZIDE (GLUCOTROL) 5 mg tablet Take 1 tablet (5 mg total) by mouth daily with breakfast   • [DISCONTINUED] losartan (COZAAR) 25 mg tablet Take 1 tablet (25 mg total) by mouth daily (Patient not taking: Reported on 4/11/2023)     Allergies   Allergen Reactions   • 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     Annotation - [id number]: Allergic to nuts, ok with Peanuts and Almonds   • Nuts - Food Allergy Anaphylaxis     Immunization History   Administered Date(s) Administered   • COVID-19 PFIZER VACCINE 0 3 ML IM 04/20/2021, 05/11/2021, 12/04/2021   • Td (adult), adsorbed 12/17/2010, 02/24/2014   • Tdap 02/24/2014       Objective     /70   Pulse 74   Resp 16   Ht 5' 10\" (1 778 m)   Wt (!) 141 kg (311 lb)   SpO2 98%   BMI 44 62 kg/m²     Physical Exam  Vitals and nursing note reviewed  Constitutional:       Appearance: He is well-developed  He is obese  He is not ill-appearing  HENT:      Right Ear: External ear normal       Left Ear: External ear normal       Nose: Nose normal       Mouth/Throat:      Pharynx: No oropharyngeal exudate  Eyes:      General: No scleral icterus  Right eye: No discharge  Left eye: No discharge  Conjunctiva/sclera: Conjunctivae normal    Neck:      Thyroid: No thyromegaly  Vascular: No carotid bruit  Trachea: No tracheal deviation  Cardiovascular:      Rate and Rhythm: Normal rate and regular rhythm  Heart sounds: Normal heart sounds   No " murmur heard  Pulmonary:      Effort: Pulmonary effort is normal  No respiratory distress  Breath sounds: Normal breath sounds  No wheezing or rales  Abdominal:      General: There is no distension  Palpations: Abdomen is soft  There is no mass  Tenderness: There is no abdominal tenderness  There is no rebound  Musculoskeletal:         General: Normal range of motion  Cervical back: Normal range of motion and neck supple  Right lower leg: No edema  Left lower leg: No edema  Lymphadenopathy:      Cervical: No cervical adenopathy  Skin:     General: Skin is warm  Coloration: Skin is not jaundiced or pale  Findings: No erythema or rash  Neurological:      General: No focal deficit present  Mental Status: He is alert and oriented to person, place, and time  Cranial Nerves: No cranial nerve deficit  Deep Tendon Reflexes: Reflexes are normal and symmetric  Psychiatric:         Behavior: Behavior normal          Thought Content:  Thought content normal          Judgment: Judgment normal        Keren Burroughs MD

## 2023-05-31 NOTE — ASSESSMENT & PLAN NOTE
He has long-term constipation issue, he was seen by gastroenterologist in the past and had colonoscopy but never addressed the constipation issue, discussed about high-fiber diet more fluid

## 2023-06-09 ENCOUNTER — TELEPHONE (OUTPATIENT)
Dept: NEPHROLOGY | Facility: CLINIC | Age: 63
End: 2023-06-09

## 2023-07-24 DIAGNOSIS — I12.9 BENIGN HYPERTENSION WITH CHRONIC KIDNEY DISEASE, STAGE III (HCC): Primary | ICD-10-CM

## 2023-07-24 DIAGNOSIS — N18.30 BENIGN HYPERTENSION WITH CHRONIC KIDNEY DISEASE, STAGE III (HCC): Primary | ICD-10-CM

## 2023-07-24 DIAGNOSIS — N18.30 CONTROLLED TYPE 2 DIABETES MELLITUS WITH STAGE 3 CHRONIC KIDNEY DISEASE, WITHOUT LONG-TERM CURRENT USE OF INSULIN (HCC): ICD-10-CM

## 2023-07-24 DIAGNOSIS — E11.22 CONTROLLED TYPE 2 DIABETES MELLITUS WITH STAGE 3 CHRONIC KIDNEY DISEASE, WITHOUT LONG-TERM CURRENT USE OF INSULIN (HCC): ICD-10-CM

## 2023-07-24 DIAGNOSIS — N18.31 STAGE 3A CHRONIC KIDNEY DISEASE (HCC): ICD-10-CM

## 2023-08-09 DIAGNOSIS — N18.31 STAGE 3A CHRONIC KIDNEY DISEASE (HCC): ICD-10-CM

## 2023-08-09 DIAGNOSIS — N18.30 CONTROLLED TYPE 2 DIABETES MELLITUS WITH STAGE 3 CHRONIC KIDNEY DISEASE, WITHOUT LONG-TERM CURRENT USE OF INSULIN (HCC): ICD-10-CM

## 2023-08-09 DIAGNOSIS — E11.22 CONTROLLED TYPE 2 DIABETES MELLITUS WITH STAGE 3 CHRONIC KIDNEY DISEASE, WITHOUT LONG-TERM CURRENT USE OF INSULIN (HCC): ICD-10-CM

## 2023-09-08 ENCOUNTER — TELEPHONE (OUTPATIENT)
Dept: GASTROENTEROLOGY | Facility: CLINIC | Age: 63
End: 2023-09-08

## 2023-09-19 ENCOUNTER — TELEPHONE (OUTPATIENT)
Dept: NEPHROLOGY | Facility: CLINIC | Age: 63
End: 2023-09-19

## 2023-09-19 NOTE — TELEPHONE ENCOUNTER
Patient called because he cannot afford Jardiance anymore since he switched health insurances. He is wondering if there is something else he can take that may be cheaper. Please advise.

## 2023-09-19 NOTE — TELEPHONE ENCOUNTER
I spoke to Sachin Caicedo he understood that Julián Salazar would be higher in price range so he said that is Rosibel Charter he will continue all his current medication as no other alternative is available.

## 2023-09-25 ENCOUNTER — TELEPHONE (OUTPATIENT)
Dept: DENTISTRY | Facility: CLINIC | Age: 63
End: 2023-09-25

## 2023-10-19 ENCOUNTER — OFFICE VISIT (OUTPATIENT)
Dept: GASTROENTEROLOGY | Facility: CLINIC | Age: 63
End: 2023-10-19
Payer: COMMERCIAL

## 2023-10-19 VITALS
HEART RATE: 65 BPM | WEIGHT: 296 LBS | HEIGHT: 70 IN | DIASTOLIC BLOOD PRESSURE: 93 MMHG | SYSTOLIC BLOOD PRESSURE: 163 MMHG | BODY MASS INDEX: 42.37 KG/M2

## 2023-10-19 DIAGNOSIS — K57.90 DIVERTICULOSIS: ICD-10-CM

## 2023-10-19 DIAGNOSIS — Z12.11 COLON CANCER SCREENING: ICD-10-CM

## 2023-10-19 DIAGNOSIS — K59.00 CONSTIPATION, UNSPECIFIED CONSTIPATION TYPE: ICD-10-CM

## 2023-10-19 DIAGNOSIS — Z86.010 HX OF ADENOMATOUS COLONIC POLYPS: Primary | ICD-10-CM

## 2023-10-19 PROBLEM — Z86.0101 HX OF ADENOMATOUS COLONIC POLYPS: Status: ACTIVE | Noted: 2023-10-19

## 2023-10-19 PROCEDURE — 99214 OFFICE O/P EST MOD 30 MIN: CPT | Performed by: NURSE PRACTITIONER

## 2023-10-19 RX ORDER — POLYETHYLENE GLYCOL 3350 17 G/17G
17 POWDER, FOR SOLUTION ORAL DAILY
Qty: 510 G | Refills: 3 | Status: SHIPPED | OUTPATIENT
Start: 2023-10-19 | End: 2023-10-19

## 2023-10-19 NOTE — PROGRESS NOTES
[facility] - Outpatient Follow-up Note  Walter Courser 66 y.o. male MRN: [id number]  Encounter: [id number]          ASSESSMENT AND PLAN:      1. Hx of adenomatous colonic polyps  Patient has history of adenomatous colonic polyps. Colonoscopy done 12/21/2022 showed no polyps. - Due for repeat colonoscopy in 5 years due to poor prep-Colonoscopy due 12/2027    2. Colon cancer screening  Up to date    3. Diverticulosis  Patient has history of diverticulosis. Colonoscopy done 12/21/2022 showed few small mild scattered diverticula In ascending colon, transverse colon, descending colon and sigmoid colon. Denies blood in stool, blood from rectal area, black tarry stool, or lower abdominal pain. -High-fiber diet    4. Constipation, unspecified constipation type  Patient reports history of constipation which has been going on for several years. Patient has tried Linzess 290 mcg in the past but had explosive diarrhea. Patient currently reports 1 bowel movement every 2 weeks and needs to take Dulcolax in order to move his bowels. - Ambulatory Referral to Gastroenterology  - linaCLOtide 145 MCG CAPS; Take 1 capsule (145 mcg total) by mouth in the morning  Dispense: 30 capsule; Refill: 5  -Patient informed that if he is having explosive diarrhea on lower dose of Linzess he should call office can try to decrease dose or try alternative medication such as Amitiza. -High-fiber diet  -Drink plenty of fluids     Follow up in 3 months  ______________________________________________________________________    SUBJECTIVE:   This is a 72-year-old male who presents to office for follow-up for constipation, diverticulosis, and adenomatous colonic polyps. Patient does report constipation which has been ongoing for several years. Patient reports eating a high-fiber diet and drinking plenty of fluids but still continues with constipation.  Patient has tried Linzess 290 mcg in the past but had explosive diarrhea. Patient currently reports 1 bowel movement every 2 weeks and needs to take Dulcolax in order to move his bowels. Patient denies abdominal pain associated with constipation. Patient denies nausea, vomiting, acid reflux, heartburn, epigastric or abdominal pain. Patient denies blood in stool, blood from rectal area, or black tarry stool. Abdomen exam benign, no abdominal tenderness or guarding. Results of recent colonoscopy reviewed with patient. No biopsies were taken at that time. Colonoscopy done 2022 showed few small mild scattered diverticuli in the ascending colon, transverse colon, descending colon, and sigmoid mild colon, and adequate bowel prep patient will need repeat colonoscopy in 5 years. REVIEW OF SYSTEMS IS OTHERWISE NEGATIVE.       Historical Information   Past Medical History:   Diagnosis Date    Anxiety     BPH (benign prostatic hyperplasia)     Colon polyp     CPAP (continuous positive airway pressure) dependence     Diabetes mellitus ([address])     Difficulty breathing 2011    Hypertension     Impaired fasting blood sugar     Last Assessed: 2013    Obesity     Sleep apnea      Past Surgical History:   Procedure Laterality Date    COLONOSCOPY      Fiberoptic; Last Assessed: 10/24/2012     Social History   Social History     Substance and Sexual Activity   Alcohol Use Yes    Alcohol/week: 12.0 standard drinks of alcohol    Types: 6 Cans of beer, 6 Standard drinks or equivalent per week    Comment: Social     Social History     Substance and Sexual Activity   Drug Use No     Social History     Tobacco Use   Smoking Status Former    Packs/day: 0.25    Years: 30.00    Total pack years: 7.50    Types: Cigarettes    Start date: 3/1/1981    Quit date: 2017    Years since quittin.3   Smokeless Tobacco Never     Family History   Problem Relation Age of Onset    Heart murmur Mother     Cerebral palsy Mother     Heart disease Mother     Sudden death Mother         Early Cancer Mother     Hypertension Mother             Breast cancer Sister 48    Pancreatic cancer Maternal Grandmother 61    Emphysema Maternal Grandfather     Hypertension Father     Prostate cancer Father     Cancer Father     Colon cancer Neg Hx     Anuerysm Neg Hx     Arrhythmia Neg Hx     Heart failure Neg Hx     Clotting disorder Neg Hx        Meds/Allergies       Current Outpatient Medications:     amLODIPine (NORVASC) 10 mg tablet    azelastine (ASTELIN) 0.1 % nasal spray    Cholecalciferol (Vitamin D3) 50 MCG (2000 UT) TABS    glipiZIDE (GLUCOTROL) 5 mg tablet    linaCLOtide 145 MCG CAPS    losartan (COZAAR) 25 mg tablet    metFORMIN (GLUCOPHAGE) 500 mg tablet    clotrimazole (LOTRIMIN) 1 % cream    Empagliflozin (Jardiance) 10 MG TABS tablet    sildenafil (VIAGRA) 100 mg tablet    Allergies   Allergen Reactions    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     E[facility] - [id number]: Allergic to nuts, ok with Peanuts and Almonds    Nuts - Food Allergy Anaphylaxis           Objective     Blood pressure 163/93, pulse 65, height 5' 10" (1.778 m), weight 134 kg (296 lb). Body mass index is 42.47 kg/m². PHYSICAL EXAM:      General Appearance:   Alert, cooperative, no distress   HEENT:   Normocephalic, atraumatic, anicteric. Neck:  Supple, symmetrical, trachea midline   Lungs:   Clear to auscultation bilaterally; no rales, rhonchi or wheezing; respirations unlabored    Heart[de-identified]   Regular rate and rhythm; no murmur, rub, or gallop. Abdomen:   Soft, non-tender, non-distended; normal bowel sounds; no masses, no organomegaly    Genitalia:   Deferred    Rectal:   Deferred    Extremities:  No cyanosis, clubbing or edema    Pulses:  2+ and symmetric    Skin:  No jaundice, rashes, or lesions    Lymph nodes:  No palpable cervical lymphadenopathy        Lab Results:   No visits with results within 1 Day(s) from this visit.    Latest known visit with results is:   Appointment on 2023   Component Date Value    Hemoglobin A1C 05/24/2023 6.7 (H)     EAG 05/24/2023 146     Sodium 05/24/2023 137     Potassium 05/24/2023 4.0     Chloride 05/24/2023 109 (H)     CO2 05/24/2023 27     ANION GAP 05/24/2023 1 (L)     BUN 05/24/2023 15     Creatinine 05/24/2023 1.42 (H)     Glucose, Fasting 05/24/2023 137 (H)     Calcium 05/24/2023 9.2     AST 05/24/2023 26     ALT 05/24/2023 28     Alkaline Phosphatase 05/24/2023 67     Total Protein 05/24/2023 7.9     Albumin 05/24/2023 3.6     Total Bilirubin 05/24/2023 0.63     eGFR 05/24/2023 52          Radiology Results:   No results found.

## 2023-11-16 DIAGNOSIS — N52.8 OTHER MALE ERECTILE DYSFUNCTION: ICD-10-CM

## 2023-11-16 RX ORDER — SILDENAFIL 100 MG/1
TABLET, FILM COATED ORAL
Qty: 30 TABLET | Refills: 0 | Status: SHIPPED | OUTPATIENT
Start: 2023-11-16

## 2023-11-17 DIAGNOSIS — N18.31 STAGE 3A CHRONIC KIDNEY DISEASE (HCC): ICD-10-CM

## 2023-11-17 DIAGNOSIS — E11.22 CONTROLLED TYPE 2 DIABETES MELLITUS WITH STAGE 3 CHRONIC KIDNEY DISEASE, WITHOUT LONG-TERM CURRENT USE OF INSULIN (HCC): ICD-10-CM

## 2023-11-17 DIAGNOSIS — N18.30 CONTROLLED TYPE 2 DIABETES MELLITUS WITH STAGE 3 CHRONIC KIDNEY DISEASE, WITHOUT LONG-TERM CURRENT USE OF INSULIN (HCC): ICD-10-CM

## 2023-12-14 ENCOUNTER — TELEPHONE (OUTPATIENT)
Dept: NEPHROLOGY | Facility: CLINIC | Age: 63
End: 2023-12-14

## 2023-12-14 NOTE — TELEPHONE ENCOUNTER
Lm for Thong Pham he has a scheduled f/u next Tuesday with , advised to please have his labs done prior to his visit.

## 2023-12-15 ENCOUNTER — APPOINTMENT (OUTPATIENT)
Dept: LAB | Facility: CLINIC | Age: 63
End: 2023-12-15
Payer: COMMERCIAL

## 2023-12-15 DIAGNOSIS — N40.1 BPH WITH OBSTRUCTION/LOWER URINARY TRACT SYMPTOMS: ICD-10-CM

## 2023-12-15 DIAGNOSIS — I12.9 BENIGN HYPERTENSION WITH CHRONIC KIDNEY DISEASE, STAGE III (HCC): ICD-10-CM

## 2023-12-15 DIAGNOSIS — N18.31 STAGE 3A CHRONIC KIDNEY DISEASE (HCC): ICD-10-CM

## 2023-12-15 DIAGNOSIS — N18.30 BENIGN HYPERTENSION WITH CHRONIC KIDNEY DISEASE, STAGE III (HCC): ICD-10-CM

## 2023-12-15 DIAGNOSIS — K59.00 CONSTIPATION, UNSPECIFIED CONSTIPATION TYPE: ICD-10-CM

## 2023-12-15 DIAGNOSIS — N13.8 BPH WITH OBSTRUCTION/LOWER URINARY TRACT SYMPTOMS: ICD-10-CM

## 2023-12-15 DIAGNOSIS — E11.22 CONTROLLED TYPE 2 DIABETES MELLITUS WITH STAGE 3 CHRONIC KIDNEY DISEASE, WITHOUT LONG-TERM CURRENT USE OF INSULIN (HCC): ICD-10-CM

## 2023-12-15 DIAGNOSIS — N40.0 BENIGN PROSTATIC HYPERPLASIA WITHOUT LOWER URINARY TRACT SYMPTOMS: ICD-10-CM

## 2023-12-15 DIAGNOSIS — E55.9 VITAMIN D DEFICIENCY: ICD-10-CM

## 2023-12-15 DIAGNOSIS — N18.30 CONTROLLED TYPE 2 DIABETES MELLITUS WITH STAGE 3 CHRONIC KIDNEY DISEASE, WITHOUT LONG-TERM CURRENT USE OF INSULIN (HCC): ICD-10-CM

## 2023-12-15 LAB
25(OH)D3 SERPL-MCNC: 46.8 NG/ML (ref 30–100)
ALBUMIN SERPL BCP-MCNC: 4.3 G/DL (ref 3.5–5)
ALP SERPL-CCNC: 60 U/L (ref 34–104)
ALT SERPL W P-5'-P-CCNC: 22 U/L (ref 7–52)
ANION GAP SERPL CALCULATED.3IONS-SCNC: 7 MMOL/L
AST SERPL W P-5'-P-CCNC: 24 U/L (ref 13–39)
BACTERIA UR QL AUTO: ABNORMAL /HPF
BILIRUB SERPL-MCNC: 0.63 MG/DL (ref 0.2–1)
BILIRUB UR QL STRIP: NEGATIVE
BUN SERPL-MCNC: 20 MG/DL (ref 5–25)
CALCIUM SERPL-MCNC: 9.5 MG/DL (ref 8.4–10.2)
CHLORIDE SERPL-SCNC: 101 MMOL/L (ref 96–108)
CHOLEST SERPL-MCNC: 141 MG/DL
CLARITY UR: CLEAR
CO2 SERPL-SCNC: 30 MMOL/L (ref 21–32)
COLOR UR: ABNORMAL
CREAT SERPL-MCNC: 1.38 MG/DL (ref 0.6–1.3)
CREAT UR-MCNC: 87.1 MG/DL
ERYTHROCYTE [DISTWIDTH] IN BLOOD BY AUTOMATED COUNT: 14.1 % (ref 11.6–15.1)
EST. AVERAGE GLUCOSE BLD GHB EST-MCNC: 151 MG/DL
GFR SERPL CREATININE-BSD FRML MDRD: 54 ML/MIN/1.73SQ M
GLUCOSE P FAST SERPL-MCNC: 116 MG/DL (ref 65–99)
GLUCOSE UR STRIP-MCNC: NEGATIVE MG/DL
HBA1C MFR BLD: 6.9 %
HCT VFR BLD AUTO: 42.9 % (ref 36.5–49.3)
HDLC SERPL-MCNC: 42 MG/DL
HGB BLD-MCNC: 13.7 G/DL (ref 12–17)
HGB UR QL STRIP.AUTO: NEGATIVE
KETONES UR STRIP-MCNC: NEGATIVE MG/DL
LDLC SERPL CALC-MCNC: 85 MG/DL (ref 0–100)
LEUKOCYTE ESTERASE UR QL STRIP: NEGATIVE
MAGNESIUM SERPL-MCNC: 2 MG/DL (ref 1.9–2.7)
MCH RBC QN AUTO: 30.2 PG (ref 26.8–34.3)
MCHC RBC AUTO-ENTMCNC: 31.9 G/DL (ref 31.4–37.4)
MCV RBC AUTO: 95 FL (ref 82–98)
NITRITE UR QL STRIP: POSITIVE
NON-SQ EPI CELLS URNS QL MICRO: ABNORMAL /HPF
PH UR STRIP.AUTO: 6.5 [PH]
PHOSPHATE SERPL-MCNC: 3 MG/DL (ref 2.3–4.1)
PLATELET # BLD AUTO: 174 THOUSANDS/UL (ref 149–390)
PMV BLD AUTO: 12.1 FL (ref 8.9–12.7)
POTASSIUM SERPL-SCNC: 4.3 MMOL/L (ref 3.5–5.3)
PROT SERPL-MCNC: 7.7 G/DL (ref 6.4–8.4)
PROT UR STRIP-MCNC: NEGATIVE MG/DL
PROT UR-MCNC: 8 MG/DL
PROT/CREAT UR: 0.09 MG/G{CREAT} (ref 0–0.1)
PTH-INTACT SERPL-MCNC: 72.7 PG/ML (ref 12–88)
RBC # BLD AUTO: 4.53 MILLION/UL (ref 3.88–5.62)
RBC #/AREA URNS AUTO: ABNORMAL /HPF
SODIUM SERPL-SCNC: 138 MMOL/L (ref 135–147)
SP GR UR STRIP.AUTO: 1.01 (ref 1–1.03)
TRIGL SERPL-MCNC: 68 MG/DL
TSH SERPL DL<=0.05 MIU/L-ACNC: 2.62 UIU/ML (ref 0.45–4.5)
UROBILINOGEN UR STRIP-ACNC: <2 MG/DL
WBC # BLD AUTO: 6.3 THOUSAND/UL (ref 4.31–10.16)
WBC #/AREA URNS AUTO: ABNORMAL /HPF

## 2023-12-15 PROCEDURE — 84156 ASSAY OF PROTEIN URINE: CPT

## 2023-12-15 PROCEDURE — 82570 ASSAY OF URINE CREATININE: CPT

## 2023-12-15 PROCEDURE — 80053 COMPREHEN METABOLIC PANEL: CPT

## 2023-12-15 PROCEDURE — 80061 LIPID PANEL: CPT

## 2023-12-15 PROCEDURE — 85027 COMPLETE CBC AUTOMATED: CPT

## 2023-12-15 PROCEDURE — 82306 VITAMIN D 25 HYDROXY: CPT

## 2023-12-15 PROCEDURE — 83036 HEMOGLOBIN GLYCOSYLATED A1C: CPT

## 2023-12-15 PROCEDURE — 84443 ASSAY THYROID STIM HORMONE: CPT

## 2023-12-15 PROCEDURE — 36415 COLL VENOUS BLD VENIPUNCTURE: CPT

## 2023-12-15 PROCEDURE — 81001 URINALYSIS AUTO W/SCOPE: CPT

## 2023-12-15 PROCEDURE — 83735 ASSAY OF MAGNESIUM: CPT

## 2023-12-15 PROCEDURE — 83970 ASSAY OF PARATHORMONE: CPT

## 2023-12-15 PROCEDURE — 84100 ASSAY OF PHOSPHORUS: CPT

## 2023-12-18 ENCOUNTER — TELEPHONE (OUTPATIENT)
Dept: FAMILY MEDICINE CLINIC | Facility: CLINIC | Age: 63
End: 2023-12-18

## 2023-12-18 PROBLEM — Z12.11 COLON CANCER SCREENING: Status: RESOLVED | Noted: 2019-06-18 | Resolved: 2023-12-18

## 2023-12-18 NOTE — RESULT ENCOUNTER NOTE
Patient needs follow-up appointment, appointment can be schedule in next few weeks, there is no urgency ,he has appointment with nephrologist coming soon

## 2023-12-18 NOTE — TELEPHONE ENCOUNTER
----- Message from Chhaya Walker MD sent at 12/18/2023  8:38 AM EST -----  Patient needs follow-up appointment, appointment can be schedule in next few weeks, there is no urgency ,he has appointment with nephrologist coming soon

## 2023-12-18 NOTE — TELEPHONE ENCOUNTER
Pt is scheduled for 1/18/24 at 8:40 as he needed early morning appt and this was Dr. Walker's first availabilty.

## 2023-12-19 ENCOUNTER — OFFICE VISIT (OUTPATIENT)
Dept: NEPHROLOGY | Facility: CLINIC | Age: 63
End: 2023-12-19
Payer: COMMERCIAL

## 2023-12-19 VITALS
DIASTOLIC BLOOD PRESSURE: 88 MMHG | SYSTOLIC BLOOD PRESSURE: 170 MMHG | WEIGHT: 303 LBS | HEIGHT: 70 IN | HEART RATE: 72 BPM | BODY MASS INDEX: 43.38 KG/M2

## 2023-12-19 DIAGNOSIS — E55.9 VITAMIN D DEFICIENCY: ICD-10-CM

## 2023-12-19 DIAGNOSIS — N18.31 STAGE 3A CHRONIC KIDNEY DISEASE (HCC): Primary | ICD-10-CM

## 2023-12-19 DIAGNOSIS — N18.30 BENIGN HYPERTENSION WITH CHRONIC KIDNEY DISEASE, STAGE III (HCC): ICD-10-CM

## 2023-12-19 DIAGNOSIS — N40.1 BPH WITH OBSTRUCTION/LOWER URINARY TRACT SYMPTOMS: ICD-10-CM

## 2023-12-19 DIAGNOSIS — N18.30 CONTROLLED TYPE 2 DIABETES MELLITUS WITH STAGE 3 CHRONIC KIDNEY DISEASE, WITHOUT LONG-TERM CURRENT USE OF INSULIN (HCC): ICD-10-CM

## 2023-12-19 DIAGNOSIS — N13.8 BPH WITH OBSTRUCTION/LOWER URINARY TRACT SYMPTOMS: ICD-10-CM

## 2023-12-19 DIAGNOSIS — E11.22 CONTROLLED TYPE 2 DIABETES MELLITUS WITH STAGE 3 CHRONIC KIDNEY DISEASE, WITHOUT LONG-TERM CURRENT USE OF INSULIN (HCC): ICD-10-CM

## 2023-12-19 DIAGNOSIS — I12.9 BENIGN HYPERTENSION WITH CHRONIC KIDNEY DISEASE, STAGE III (HCC): ICD-10-CM

## 2023-12-19 PROCEDURE — 99214 OFFICE O/P EST MOD 30 MIN: CPT | Performed by: INTERNAL MEDICINE

## 2023-12-19 NOTE — PATIENT INSTRUCTIONS
-Renal Function is stable   -You have Chronic Kidney Disease Stage  3   -Avoid NSAIDs like Ibuprofen/Motrin, Naproxen/Aleve, Celebrex, meloxicam/Mobic, Diclofenac and other NSAIDs.  -Okay to take Acetaminophen/Tylenol if you do not have any liver problems  -Avoid IV contrast used for CT scan and cardiac catheterization.    -If plan for any study with IV contrast, please let me know so we could hydrate with fluids before and after IV contrast  -Dosage  of certain medications may need to be adjusted depending on Kidney function.     Blood pressure is stable    -Recommend 2 g sodium diet.    -Recommend daily exercise and weight loss  -Discussed home monitoring of BP and maintaining a log of blood pressure.  -Recommend goal BP less than 130/80. Please inform me if SBP below 110 or above 140's persistently.    Follow up:  6  months with repeat Lab work within a week of the scheduled office visit. Will discuss the results of the previsit Labs during the office visit.

## 2023-12-19 NOTE — PROGRESS NOTES
NEPHROLOGY OUTPATIENT PROGRESS NOTE   Ever Price 63 y.o. male MRN: [id number]  DATE: 12/19/2023  Reason for visit:   Chief Complaint   Patient presents with    Follow-up    CKD III       ASSESSMENT and PLAN:   Chronic kidney disease stage 3a:    Baseline creatinine 1.4-1.5.   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    CT abdomen pelvis without contrast on 08/31/2021 which did not show any hydronephrosis and kidneys were unremarkable, findings of diverticulosis.  Reviewed blood work from 12/15/2023, renal function remained stable, last creatinine was 1.38 mg/dL with stable electrolytes, normal phosphorus magnesium and PTH.  Continue to monitor renal function continue to avoid nephrotoxins like NSAIDs and IV contrast.  Also proteinuria has resolved, continue to monitor  Discussed about need for better diabetic control with goal A1c less than 7.0 goal blood pressure less than 130/80.  Last A1c was at goal at 6.9  SGLT 2 inhibitor: not able to afford it and so explained that no alternative to it   LDL at goal   Follow-up in 6 months with repeat labs     Primary hypertension with chronic kidney disease stage 3  On amlodipine 10 mg daily and losartan 25 mg daily   BP high today abut was high in October as well . Not interested in increasing medication today as he missed medication today am and yesterday. Stressed on compliance with medication. Check BP at home.  IF BP high , then would increase losartan   Stressed on importance of good BP control   Low salt diet discussed   He was on lisinopril in the past which was stopped in March 2020 due to complain of cough and was started on losartan 25 mg daily but not taking it currently.     Vitamin D deficiency  Vitamin D level improved to normal range at 46.8 . Continue  vitamin D 2000 units daily . OTC    Diabetes mellitus type 2 with CKD 3  Discussed about goal A1c less than 7.0  Last A1c 6.9, which is at  goal   On Glipizide per PCP  Daily exercise and weight loss     Obstructive sleep apnea:  Continue CPAP.  Stressed on weight loss     BPH with lower urinary tract symptoms/urinary incontinence: s/p cystoscopy on 10/13/21 for overactive bladder.  Cystoscopy was suggestive of diabetic cystoscopy and bladder overactivity.   Still with nocturia.  Reviewed last note from April - noted plan for follow up in one year      Morbid obesity- stressed on daily exercise and weight loss   Body mass index is 43.48 kg/m².         Patient Instructions   -Renal Function is stable   -You have Chronic Kidney Disease Stage  3   -Avoid NSAIDs like Ibuprofen/Motrin, Naproxen/Aleve, Celebrex, meloxicam/Mobic, Diclofenac and other NSAIDs.  -Okay to take Acetaminophen/Tylenol if you do not have any liver problems  -Avoid IV contrast used for CT scan and cardiac catheterization.    -If plan for any study with IV contrast, please let me know so we could hydrate with fluids before and after IV contrast  -Dosage  of certain medications may need to be adjusted depending on Kidney function.     Blood pressure is stable    -Recommend 2 g sodium diet.    -Recommend daily exercise and weight loss  -Discussed home monitoring of BP and maintaining a log of blood pressure.  -Recommend goal BP less than 130/80. Please inform me if SBP below 110 or above 140's persistently.    Follow up:  6  months with repeat Lab work within a week of the scheduled office visit. Will discuss the results of the previsit Labs during the office visit.       Diagnoses and all orders for this visit:    Stage 3a chronic kidney disease (HCC)  -     Basic metabolic panel; Future  -     Protein / creatinine ratio, urine; Future  -     Albumin / creatinine urine ratio; Future  -     PTH, intact; Future  -     Urinalysis with microscopic; Future  -     Phosphorus; Future  -     Magnesium; Future  -     CBC; Future    Benign hypertension with chronic kidney disease, stage III  ([facility])  -     Basic metabolic panel; Future    Vitamin D deficiency  -     Vitamin D 25 hydroxy; Future    Controlled type 2 diabetes mellitus with stage 3 chronic kidney disease, without long-term current use of insulin ([facility])  -     Basic metabolic panel; Future  -     Protein / creatinine ratio, urine; Future  -     Albumin / creatinine urine ratio; Future    BPH with obstruction/lower urinary tract symptoms  -     Basic metabolic panel; Future    BMI 40.0-44.9, adult ([facility])  -     Basic metabolic panel; Future              SUBJECTIVE / HPI:  Ever Price is a 63 y.o.  male  history of diabetes mellitus type 2 x 2 yrs, hypertension x 2 yrs, urinary incontinence,  sleep apnea, BPH  following for chronic kidney disease stage 3.     Elevated creatinine present at least since May 2017 and has been stable at creatinine 1.4-1.5 which is likely the baseline.      Reviewed labs from 12/15/2023, renal function currently stable at creatinine 1.38 mg/dL with stable electrolytes, vitamin D improved to 46.8 from previous level of 24.6.  LDL at goal at 85.  A1c 6.9, PTH at normal range of 72.7.  No proteinuria with UPC ratio 0.09,  No new complaints, blood pressure well-controlled    No UTI symptoms   Reviewed GI and PCP note      REVIEW OF SYSTEMS:    Review of Systems   Constitutional:  Negative for chills and fever.   HENT:  Negative for ear pain and sore throat.    Eyes:  Negative for pain and visual disturbance.   Respiratory:  Negative for cough and shortness of breath.    Cardiovascular:  Negative for chest pain and palpitations.   Gastrointestinal:  Negative for abdominal pain and vomiting.   Genitourinary:  Negative for dysuria and hematuria.   Musculoskeletal:  Negative for arthralgias and back pain.   Skin:  Negative for color change and rash.   Neurological:  Negative for seizures and syncope.   All other systems reviewed and are negative.      More than 10 point review of systems were obtained and discussed in  length with the patient. Complete review of systems were negative / unremarkable except mentioned above.       PAST MEDICAL HISTORY:  Past Medical History:   Diagnosis Date    Anxiety     BPH (benign prostatic hyperplasia)     Colon polyp     CPAP (continuous positive airway pressure) dependence     Diabetes mellitus (HCC)     Difficulty breathing 2011    Hypertension     Impaired fasting blood sugar     Last Assessed: 2013    Obesity     Sleep apnea        PAST SURGICAL HISTORY:  Past Surgical History:   Procedure Laterality Date    COLONOSCOPY      Fiberoptic; Last Assessed: 10/24/2012       SOCIAL HISTORY:  Social History     Substance and Sexual Activity   Alcohol Use Yes    Alcohol/week: 12.0 standard drinks of alcohol    Types: 6 Cans of beer, 6 Standard drinks or equivalent per week    Comment: Social     Social History     Substance and Sexual Activity   Drug Use No     Social History     Tobacco Use   Smoking Status Former    Current packs/day: 0.00    Average packs/day: 0.3 packs/day for 36.3 years (9.1 ttl pk-yrs)    Types: Cigarettes    Start date: 3/1/1981    Quit date: 2017    Years since quittin.5   Smokeless Tobacco Never       FAMILY HISTORY:  Family History   Problem Relation Age of Onset    Heart murmur Mother     Cerebral palsy Mother     Heart disease Mother     Sudden death Mother         Early    Cancer Mother     Hypertension Mother             Breast cancer Sister 50    Pancreatic cancer Maternal Grandmother 60    Emphysema Maternal Grandfather     Hypertension Father     Prostate cancer Father     Cancer Father     Colon cancer Neg Hx     Anuerysm Neg Hx     Arrhythmia Neg Hx     Heart failure Neg Hx     Clotting disorder Neg Hx        MEDICATIONS:    Current Outpatient Medications:     amLODIPine (NORVASC) 10 mg tablet, Take 1 tablet (10 mg total) by mouth daily, Disp: 90 tablet, Rfl: 3    Cholecalciferol (Vitamin D3) 50 MCG (2000 UT) TABS, Take 1 tablet (2,000  "Units total) by mouth daily, Disp: 90 tablet, Rfl: 1    glipiZIDE (GLUCOTROL) 5 mg tablet, Take 1 tablet (5 mg total) by mouth daily with breakfast, Disp: 90 tablet, Rfl: 3    losartan (COZAAR) 25 mg tablet, Take 1 tablet (25 mg total) by mouth daily, Disp: 90 tablet, Rfl: 3    metFORMIN (GLUCOPHAGE) 500 mg tablet, Take 1 tablet (500 mg total) by mouth 2 (two) times a day with meals, Disp: 180 tablet, Rfl: 0    sildenafil (VIAGRA) 100 mg tablet, TAKE 1 TABLET BY MOUTH ONCE DAILY AS NEEDED FOR ERECTILE DYSFUNCTION, Disp: 30 tablet, Rfl: 0    azelastine (ASTELIN) 0.1 % nasal spray, 1 spray into each nostril 2 (two) times a day Use in each nostril as directed, Disp: 30 mL, Rfl: 0    clotrimazole (LOTRIMIN) 1 % cream, Apply topically 2 (two) times a day (Patient not taking: Reported on 10/19/2023), Disp: 30 g, Rfl: 0    linaCLOtide 145 MCG CAPS, Take 1 capsule (145 mcg total) by mouth in the morning (Patient not taking: Reported on 12/19/2023), Disp: 30 capsule, Rfl: 5      PHYSICAL EXAM:  Vitals:    12/19/23 1022 12/19/23 1039   BP: (!) 172/98 170/88   BP Location: Right arm    Patient Position: Sitting    Cuff Size: Extra-Large    Pulse: 72    Weight: (!) 137 kg (303 lb)    Height: 5' 10\" (1.778 m)      Body mass index is 43.48 kg/m².    Physical Exam  Constitutional:       General: He is not in acute distress.     Appearance: He is well-developed. He is not diaphoretic.   HENT:      Head: Normocephalic and atraumatic.      Mouth/Throat:      Mouth: Mucous membranes are moist.   Eyes:      General: No scleral icterus.     Conjunctiva/sclera: Conjunctivae normal.      Pupils: Pupils are equal, round, and reactive to light.   Neck:      Thyroid: No thyromegaly.   Cardiovascular:      Rate and Rhythm: Normal rate and regular rhythm.      Heart sounds: Normal heart sounds. No murmur heard.     No friction rub.   Pulmonary:      Effort: Pulmonary effort is normal. No respiratory distress.      Breath sounds: Normal breath " sounds. No wheezing or rales.   Abdominal:      General: Bowel sounds are normal. There is no distension.      Palpations: Abdomen is soft.      Tenderness: There is no abdominal tenderness.   Musculoskeletal:         General: No deformity.      Cervical back: Neck supple.      Right lower leg: No edema.      Left lower leg: No edema.   Lymphadenopathy:      Cervical: No cervical adenopathy.   Skin:     Coloration: Skin is not pale.      Nails: There is no clubbing.   Neurological:      Mental Status: He is alert and oriented to person, place, and time. He is not disoriented.   Psychiatric:         Mood and Affect: Mood normal. Mood is not anxious. Affect is not inappropriate.         Behavior: Behavior normal.         Thought Content: Thought content normal.         Lab Results:   Results for orders placed or performed in visit on 12/15/23   Protein / creatinine ratio, urine   Result Value Ref Range    Creatinine, Ur 87.1 Reference range not established. mg/dL    Protein Urine Random 8 Reference range not established. mg/dL    Prot/Creat Ratio, Ur 0.09 0.00 - 0.10   PTH, intact   Result Value Ref Range    PTH 72.7 12.0 - 88.0 pg/mL   Urinalysis with microscopic   Result Value Ref Range    Color, UA Light Yellow     Clarity, UA Clear     Specific Gravity, UA 1.015 1.003 - 1.030    pH, UA 6.5 4.5, 5.0, 5.5, 6.0, 6.5, 7.0, 7.5, 8.0    Leukocytes, UA Negative Negative    Nitrite, UA Positive (A) Negative    Protein, UA Negative Negative mg/dl    Glucose, UA Negative Negative mg/dl    Ketones, UA Negative Negative mg/dl    Urobilinogen, UA <2.0 <2.0 mg/dl mg/dl    Bilirubin, UA Negative Negative    Occult Blood, UA Negative Negative    RBC, UA 1-2 None Seen, 1-2 /hpf    WBC, UA 4-10 (A) None Seen, 1-2 /hpf    Epithelial Cells Occasional None Seen, Occasional /hpf    Bacteria, UA None Seen None Seen, Occasional /hpf   Vitamin D 25 hydroxy   Result Value Ref Range    Vit D, 25-Hydroxy 46.8 30.0 - 100.0 ng/mL   Phosphorus    Result Value Ref Range    Phosphorus 3.0 2.3 - 4.1 mg/dL   Magnesium   Result Value Ref Range    Magnesium 2.0 1.9 - 2.7 mg/dL   CBC   Result Value Ref Range    WBC 6.30 4.31 - 10.16 Thousand/uL    RBC 4.53 3.88 - 5.62 Million/uL    Hemoglobin 13.7 12.0 - 17.0 g/dL    Hematocrit 42.9 36.5 - 49.3 %    MCV 95 82 - 98 fL    MCH 30.2 26.8 - 34.3 pg    MCHC 31.9 31.4 - 37.4 g/dL    RDW 14.1 11.6 - 15.1 %    Platelets 174 149 - 390 Thousands/uL    MPV 12.1 8.9 - 12.7 fL   Hemoglobin A1C   Result Value Ref Range    Hemoglobin A1C 6.9 (H) Normal 4.0-5.6%; PreDiabetic 5.7-6.4%; Diabetic >=6.5%; Glycemic control for adults with diabetes <7.0% %     mg/dl   Comprehensive metabolic panel   Result Value Ref Range    Sodium 138 135 - 147 mmol/L    Potassium 4.3 3.5 - 5.3 mmol/L    Chloride 101 96 - 108 mmol/L    CO2 30 21 - 32 mmol/L    ANION GAP 7 mmol/L    BUN 20 5 - 25 mg/dL    Creatinine 1.38 (H) 0.60 - 1.30 mg/dL    Glucose, Fasting 116 (H) 65 - 99 mg/dL    Calcium 9.5 8.4 - 10.2 mg/dL    AST 24 13 - 39 U/L    ALT 22 7 - 52 U/L    Alkaline Phosphatase 60 34 - 104 U/L    Total Protein 7.7 6.4 - 8.4 g/dL    Albumin 4.3 3.5 - 5.0 g/dL    Total Bilirubin 0.63 0.20 - 1.00 mg/dL    eGFR 54 ml/min/1.73sq m   TSH, 3rd generation with Free T4 reflex   Result Value Ref Range    TSH 3RD GENERATON 2.619 0.450 - 4.500 uIU/mL   Lipid Panel with Direct LDL reflex   Result Value Ref Range    Cholesterol 141 See Comment mg/dL    Triglycerides 68 See Comment mg/dL    HDL, Direct 42 >=40 mg/dL    LDL Calculated 85 0 - 100 mg/dL

## 2024-01-18 ENCOUNTER — OFFICE VISIT (OUTPATIENT)
Dept: FAMILY MEDICINE CLINIC | Facility: CLINIC | Age: 64
End: 2024-01-18
Payer: COMMERCIAL

## 2024-01-18 VITALS
SYSTOLIC BLOOD PRESSURE: 126 MMHG | WEIGHT: 302 LBS | HEIGHT: 70 IN | DIASTOLIC BLOOD PRESSURE: 82 MMHG | RESPIRATION RATE: 16 BRPM | HEART RATE: 74 BPM | OXYGEN SATURATION: 97 % | BODY MASS INDEX: 43.23 KG/M2

## 2024-01-18 DIAGNOSIS — R35.0 INCREASED FREQUENCY OF URINATION: ICD-10-CM

## 2024-01-18 DIAGNOSIS — G47.33 OSA (OBSTRUCTIVE SLEEP APNEA): ICD-10-CM

## 2024-01-18 DIAGNOSIS — E11.22 CONTROLLED TYPE 2 DIABETES MELLITUS WITH STAGE 3 CHRONIC KIDNEY DISEASE, WITHOUT LONG-TERM CURRENT USE OF INSULIN (HCC): Primary | ICD-10-CM

## 2024-01-18 DIAGNOSIS — I10 ESSENTIAL HYPERTENSION: ICD-10-CM

## 2024-01-18 DIAGNOSIS — N18.30 CONTROLLED TYPE 2 DIABETES MELLITUS WITH STAGE 3 CHRONIC KIDNEY DISEASE, WITHOUT LONG-TERM CURRENT USE OF INSULIN (HCC): Primary | ICD-10-CM

## 2024-01-18 PROBLEM — Z11.59 NEED FOR HEPATITIS C SCREENING TEST: Status: RESOLVED | Noted: 2019-06-18 | Resolved: 2024-01-18

## 2024-01-18 PROBLEM — F41.9 ANXIETY: Status: RESOLVED | Noted: 2017-08-22 | Resolved: 2024-01-18

## 2024-01-18 PROCEDURE — 99214 OFFICE O/P EST MOD 30 MIN: CPT | Performed by: FAMILY MEDICINE

## 2024-01-18 NOTE — ASSESSMENT & PLAN NOTE
Advised to have low-carb diet low calorie diet so he can lose weight discussed about going to weight management with [facility]

## 2024-01-18 NOTE — PROGRESS NOTES
Name: Ever Price      : 1960      MRN: [id number]  Encounter Provider: Chhaya Walker MD  Encounter Date: 2024   Encounter department: [facility]    Assessment & Plan     1. Controlled type 2 diabetes mellitus with stage 3 chronic kidney disease, without long-term current use of insulin (HCC)  -     Diabetic foot exam; Future  -     Basic metabolic panel; Future; Expected date: 2024  -     Hemoglobin A1c (w/out EAG) ([facility] ONLY); Future; Expected date: 2024  -     Hemoglobin A1c (w/out EAG) ([facility] ONLY)    2. STACY (obstructive sleep apnea)  Assessment & Plan:  Use CPAP machine and plans to see the sleep specialist      3. Essential hypertension  Assessment & Plan:  Hypertension is stable, continue low-salt diet, continue same medications.  Follows nephrologist      4. BMI 40.0-44.9, adult ([facility])  Assessment & Plan:  Advised to have low-carb diet low calorie diet so he can lose weight discussed about going to weight management with [facility]      5. Increased frequency of urination  Assessment & Plan:  C/o frequency of urine, will repeat urine test    Orders:  -     UA w Reflex to Microscopic w Reflex to Culture; Future        Depression Screening and Follow-up Plan: Patient was screened for depression during today's encounter. They screened negative with a PHQ-2 score of 0.    Discussed about getting pneumonia and flu vaccine, he refused     Subjective     Follow-up on diabetes, chronic kidney disease he follows nephrologist, [facility] but he will try to keep his speciality follow-up with his [facility] doctors,  He has not lost any weight, he was referred to weight management in the past,  Taking all his medication and use CPAP machine      Review of Systems   Constitutional:  Negative for activity change, appetite change, chills, fatigue, fever and unexpected weight change.   HENT:  Negative for congestion, ear  discharge, ear pain, nosebleeds, postnasal drip, rhinorrhea, sinus pressure, sneezing, sore throat, trouble swallowing and voice change.    Eyes:  Negative for photophobia, pain, discharge, redness and itching.   Respiratory:  Negative for cough, chest tightness, shortness of breath and wheezing.    Cardiovascular:  Negative for chest pain, palpitations and leg swelling.   Gastrointestinal:  Negative for abdominal pain, constipation, diarrhea, nausea and vomiting.   Endocrine: Negative for polyuria.   Genitourinary:  Positive for frequency. Negative for dysuria and urgency.   Musculoskeletal:  Negative for arthralgias, back pain, myalgias and neck pain.   Skin:  Negative for color change, pallor and rash.   Allergic/Immunologic: Negative for environmental allergies and food allergies.   Neurological:  Negative for dizziness, weakness, light-headedness and headaches.   Hematological:  Negative for adenopathy. Does not bruise/bleed easily.   Psychiatric/Behavioral:  Negative for behavioral problems. The patient is not nervous/anxious.        Past Medical History:   Diagnosis Date   • Anxiety    • BPH (benign prostatic hyperplasia)    • Colon polyp    • CPAP (continuous positive airway pressure) dependence    • Diabetes mellitus (HCC)    • Difficulty breathing 2011   • Hypertension    • Impaired fasting blood sugar     Last Assessed: 2013   • Obesity    • Sleep apnea      Past Surgical History:   Procedure Laterality Date   • COLONOSCOPY      Fiberoptic; Last Assessed: 10/24/2012     Family History   Problem Relation Age of Onset   • Heart murmur Mother    • Cerebral palsy Mother    • Heart disease Mother    • Sudden death Mother         Early   • Cancer Mother    • Hypertension Mother            • Breast cancer Sister 50   • Pancreatic cancer Maternal Grandmother 60   • Emphysema Maternal Grandfather    • Hypertension Father    • Prostate cancer Father    • Cancer Father    • Colon cancer Neg Hx    •  Anuerysm Neg Hx    • Arrhythmia Neg Hx    • Heart failure Neg Hx    • Clotting disorder Neg Hx      Social History     Socioeconomic History   • Marital status: Single     Spouse name: None   • Number of children: None   • Years of education: None   • Highest education level: None   Occupational History   • Occupation: [employer]   Tobacco Use   • Smoking status: Former     Current packs/day: 0.00     Average packs/day: 0.3 packs/day for 36.3 years (9.1 ttl pk-yrs)     Types: Cigarettes     Start date: 3/1/1981     Quit date: 2017     Years since quittin.6   • Smokeless tobacco: Never   Vaping Use   • Vaping status: Never Used   Substance and Sexual Activity   • Alcohol use: Yes     Alcohol/week: 12.0 standard drinks of alcohol     Types: 6 Cans of beer, 6 Standard drinks or equivalent per week     Comment: Social   • Drug use: No   • Sexual activity: Yes     Partners: Female     Birth control/protection: Condom Male     Comment: no new partners in the past 3months    Other Topics Concern   • None   Social History Narrative    Daily caffeine consumption    Dental care, regularly    Inadequate exercise    Poorly balanced diet     Social Determinants of Health     Financial Resource Strain: Medium Risk (2023)    Overall Financial Resource Strain (CARDIA)    • Difficulty of Paying Living Expenses: Somewhat hard   Food Insecurity: Food Insecurity Present (2023)    Hunger Vital Sign    • Worried About Running Out of Food in the Last Year: Sometimes true    • Ran Out of Food in the Last Year: Sometimes true   Transportation Needs: No Transportation Needs (2023)    PRAPARE - Transportation    • Lack of Transportation (Medical): No    • Lack of Transportation (Non-Medical): No   Physical Activity: Not on file   Stress: Not on file   Social Connections: Not on file   Intimate Partner Violence: Not on file   Housing Stability: Not on file     Current Outpatient Medications on File Prior to Visit   Medication  "Sig   • amLODIPine (NORVASC) 10 mg tablet Take 1 tablet (10 mg total) by mouth daily   • azelastine (ASTELIN) 0.1 % nasal spray 1 spray into each nostril 2 (two) times a day Use in each nostril as directed   • Cholecalciferol (Vitamin D3) 50 MCG (2000 UT) TABS Take 1 tablet (2,000 Units total) by mouth daily   • glipiZIDE (GLUCOTROL) 5 mg tablet Take 1 tablet (5 mg total) by mouth daily with breakfast   • losartan (COZAAR) 25 mg tablet Take 1 tablet (25 mg total) by mouth daily   • metFORMIN (GLUCOPHAGE) 500 mg tablet Take 1 tablet (500 mg total) by mouth 2 (two) times a day with meals   • sildenafil (VIAGRA) 100 mg tablet TAKE 1 TABLET BY MOUTH ONCE DAILY AS NEEDED FOR ERECTILE DYSFUNCTION   • clotrimazole (LOTRIMIN) 1 % cream Apply topically 2 (two) times a day (Patient not taking: Reported on 10/19/2023)   • linaCLOtide 145 MCG CAPS Take 1 capsule (145 mcg total) by mouth in the morning (Patient not taking: Reported on 12/19/2023)     Allergies   Allergen Reactions   • Nuts - Food Allergy Anaphylaxis     Immunization History   Administered Date(s) Administered   • COVID-19 PFIZER VACCINE 0.3 ML IM 04/20/2021, 05/11/2021, 12/04/2021   • Td (adult), adsorbed 12/17/2010, 02/24/2014   • Tdap 02/24/2014       Objective     /82 (BP Location: Left arm, Patient Position: Sitting, Cuff Size: Large)   Pulse 74   Resp 16   Ht 5' 10\" (1.778 m)   Wt (!) 137 kg (302 lb)   SpO2 97%   BMI 43.33 kg/m²     Physical Exam  Vitals and nursing note reviewed.   Constitutional:       Appearance: He is well-developed. He is obese.   HENT:      Head: Normocephalic and atraumatic.      Nose: Nose normal.      Mouth/Throat:      Pharynx: No oropharyngeal exudate.   Eyes:      General: No scleral icterus.        Right eye: No discharge.         Left eye: No discharge.      Extraocular Movements: Extraocular movements intact.      Conjunctiva/sclera: Conjunctivae normal.   Neck:      Thyroid: No thyromegaly.      Vascular: No " carotid bruit.      Trachea: No tracheal deviation.   Cardiovascular:      Rate and Rhythm: Normal rate and regular rhythm.      Pulses:           Dorsalis pedis pulses are 2+ on the right side and 2+ on the left side.      Heart sounds: Normal heart sounds. No murmur heard.  Pulmonary:      Effort: Pulmonary effort is normal. No respiratory distress.      Breath sounds: Normal breath sounds. No wheezing or rales.   Abdominal:      General: Bowel sounds are normal. There is no distension.      Palpations: Abdomen is soft. There is no mass.      Tenderness: There is no abdominal tenderness. There is no rebound.   Musculoskeletal:         General: Normal range of motion.      Cervical back: Normal range of motion and neck supple.      Right lower leg: No edema.      Left lower leg: No edema.   Feet:      Right foot:      Skin integrity: Dry skin present. No ulcer, skin breakdown, erythema, warmth or callus.      Left foot:      Skin integrity: Dry skin present. No ulcer, skin breakdown, erythema, warmth or callus.   Lymphadenopathy:      Cervical: No cervical adenopathy.   Skin:     General: Skin is warm.      Coloration: Skin is not pale.      Findings: No erythema or rash.      Comments: Dry skin    Neurological:      Mental Status: He is alert and oriented to person, place, and time.      Cranial Nerves: No cranial nerve deficit.      Deep Tendon Reflexes: Reflexes are normal and symmetric.   Psychiatric:         Behavior: Behavior normal.         Thought Content: Thought content normal.         Judgment: Judgment normal.     Diabetic Foot Exam    Patient's shoes and socks removed.    Right Foot/Ankle   Right Foot Inspection  Skin Exam: skin normal and dry skin. Skin not intact, no warmth, no callus, no erythema, no maceration, no abnormal color, no pre-ulcer, no ulcer and no callus.     Toe Exam: ROM and strength within normal limits.     Sensory   Vibration: intact  Proprioception: intact  Monofilament testing:  intact    Vascular  Capillary refills: < 3 seconds  The right DP pulse is 2+.     Left Foot/Ankle  Left Foot Inspection  Skin Exam: skin normal and dry skin. Skin not intact, no warmth, no erythema, no maceration, normal color, no pre-ulcer, no ulcer and no callus.     Toe Exam: ROM and strength within normal limits.     Sensory   Vibration: intact  Proprioception: intact  Monofilament testing: intact    Vascular  Capillary refills: < 3 seconds  The left DP pulse is 2+.     Chhaya Walker MD

## 2024-03-28 ENCOUNTER — OFFICE VISIT (OUTPATIENT)
Dept: GASTROENTEROLOGY | Facility: CLINIC | Age: 64
End: 2024-03-28
Payer: COMMERCIAL

## 2024-03-28 VITALS
DIASTOLIC BLOOD PRESSURE: 105 MMHG | BODY MASS INDEX: 43.52 KG/M2 | HEIGHT: 70 IN | SYSTOLIC BLOOD PRESSURE: 174 MMHG | HEART RATE: 60 BPM | WEIGHT: 304 LBS

## 2024-03-28 DIAGNOSIS — Z86.010 HX OF ADENOMATOUS COLONIC POLYPS: ICD-10-CM

## 2024-03-28 DIAGNOSIS — K59.00 CONSTIPATION, UNSPECIFIED CONSTIPATION TYPE: Primary | ICD-10-CM

## 2024-03-28 DIAGNOSIS — K57.90 DIVERTICULOSIS: ICD-10-CM

## 2024-03-28 PROCEDURE — 99213 OFFICE O/P EST LOW 20 MIN: CPT | Performed by: NURSE PRACTITIONER

## 2024-03-28 RX ORDER — LUBIPROSTONE 8 UG/1
8 CAPSULE ORAL 2 TIMES DAILY WITH MEALS
Qty: 30 CAPSULE | Refills: 1 | Status: SHIPPED | OUTPATIENT
Start: 2024-03-28

## 2024-03-28 NOTE — PROGRESS NOTES
[facility] [place] - Outpatient Follow-up Note  Ever Price 64 y.o. male MRN: [id number]  Encounter: [id number]          ASSESSMENT AND PLAN:      1. Constipation, unspecified constipation type  Patient has had failure of OTC treatment with MiraLAX, Senokot, high-fiber diet.  Was unable to tolerate Linzess 290 and 145 doses due to diarrhea.  He is no longer able to afford Linzess because his insurance changed and his co-pay is too high.  He states that he will be changing insurance again to [facility] insurance which should cover more in the future.  He is currently having moderate sized hard balls of stool every 2 weeks.    -Complete a bowel cleanse with 238 g of MiraLAX in 64 ounces of Gatorade, then start Amitiza 8 mcg twice daily with food the day following if covered by insurance.  -If covered by insurance but not effective, can increase dose to 24 mcg twice daily for chronic constipation however patient has been unable to tolerate higher doses so we will start with 8 mcg  -Other alternatives if Amitiza is not covered would be Trulance or titrating MiraLAX as he only tried 1 capful of MiraLAX daily in the past    2. Diverticulosis  Patient with history of diverticulosis scattered throughout the colon.    -Start Metamucil 1 tablespoon daily in a glass of water to help prevent complications of diverticular disease    3. Hx of adenomatous colonic polyps  Last colonoscopy in December 2022 with suboptimal prep in some areas especially the sigmoid and cecum, repeat was recommended in 5 years  -Colonoscopy due December 2027    Patient will follow-up in September at next available follow-up, however instructed him to reach out via [employer] if he has any issues in the meantime or if Amitiza is not covered.  Then may try Trulance or higher doses of MiraLAX  ______________________________________________________________________    SUBJECTIVE:  Ever Price is a 64 y.o. male with history of diabetes,  HTN, BPH, chronic constipation presenting for follow-up.  He was unable to tolerate Linzess 290 mcg daily due to diarrhea and when this was decreased to 145 mcg daily he reports the same.  He changed his insurance and the co-pay was too high for the Linzess so he stopped taking this several months ago and is having hard balls of stool every 2 weeks.  Denies any abdominal pain or nausea.  His last colonoscopy was in  and he will be due again in  with Dr. Canas.  He reports he will be switching insurance again to the [facility] insurance which hopefully should cover more.        REVIEW OF SYSTEMS IS OTHERWISE NEGATIVE.      Historical Information   Past Medical History:   Diagnosis Date    Anxiety     BPH (benign prostatic hyperplasia)     Colon polyp     CPAP (continuous positive airway pressure) dependence     Diabetes mellitus (HCC)     Difficulty breathing 2011    Hypertension     Impaired fasting blood sugar     Last Assessed: 2013    Obesity     Sleep apnea      Past Surgical History:   Procedure Laterality Date    COLONOSCOPY      Fiberoptic; Last Assessed: 10/24/2012     Social History   Social History     Substance and Sexual Activity   Alcohol Use Yes    Alcohol/week: 12.0 standard drinks of alcohol    Types: 6 Cans of beer, 6 Standard drinks or equivalent per week    Comment: Social     Social History     Substance and Sexual Activity   Drug Use No     Social History     Tobacco Use   Smoking Status Former    Current packs/day: 0.00    Average packs/day: 0.3 packs/day for 36.3 years (9.1 ttl pk-yrs)    Types: Cigarettes    Start date: 3/1/1981    Quit date: 2017    Years since quittin.8   Smokeless Tobacco Never     Family History   Problem Relation Age of Onset    Heart murmur Mother     Cerebral palsy Mother     Heart disease Mother     Sudden death Mother         Early    Cancer Mother     Hypertension Mother             Breast cancer Sister 50    Pancreatic cancer Maternal  "Grandmother 60    Emphysema Maternal Grandfather     Hypertension Father     Prostate cancer Father     Cancer Father     Colon cancer Neg Hx     Anuerysm Neg Hx     Arrhythmia Neg Hx     Heart failure Neg Hx     Clotting disorder Neg Hx        Meds/Allergies       Current Outpatient Medications:     amLODIPine (NORVASC) 10 mg tablet    azelastine (ASTELIN) 0.1 % nasal spray    Cholecalciferol (Vitamin D3) 50 MCG (2000 UT) TABS    glipiZIDE (GLUCOTROL) 5 mg tablet    losartan (COZAAR) 25 mg tablet    metFORMIN (GLUCOPHAGE) 500 mg tablet    sildenafil (VIAGRA) 100 mg tablet    clotrimazole (LOTRIMIN) 1 % cream    linaCLOtide 145 MCG CAPS    Allergies   Allergen Reactions    Nuts - Food Allergy Anaphylaxis           Objective     Blood pressure (!) 174/105, pulse 60, height 5' 10\" (1.778 m), weight (!) 138 kg (304 lb). Body mass index is 43.62 kg/m².      PHYSICAL EXAM:      General Appearance:   Alert, cooperative, no distress   HEENT:   Normocephalic, atraumatic, anicteric.     Neck:  Supple, symmetrical, trachea midline   Lungs:   Clear to auscultation bilaterally; no rales, rhonchi or wheezing; respirations unlabored    Heart::   Regular rate and rhythm; no murmur.   Abdomen:   Soft, non-tender, non-distended; normal bowel sounds; no masses, no organomegaly    Genitalia:   Deferred    Rectal:   Deferred    Extremities:  No cyanosis, clubbing or edema    Skin:  No jaundice, rashes, or lesions    Lymph nodes:  No palpable cervical lymphadenopathy        Lab Results:   No visits with results within 1 Day(s) from this visit.   Latest known visit with results is:   Lab on 12/15/2023   Component Date Value    Creatinine, Ur 12/15/2023 87.1     Protein Urine Random 12/15/2023 8     Prot/Creat Ratio, Ur 12/15/2023 0.09     PTH 12/15/2023 72.7     Color, UA 12/15/2023 Light Yellow     Clarity, UA 12/15/2023 Clear     Specific Gravity, UA 12/15/2023 1.015     pH, UA 12/15/2023 6.5     Leukocytes, UA 12/15/2023 Negative     " Nitrite, UA 12/15/2023 Positive (A)     Protein, UA 12/15/2023 Negative     Glucose, UA 12/15/2023 Negative     Ketones, UA 12/15/2023 Negative     Urobilinogen, UA 12/15/2023 <2.0     Bilirubin, UA 12/15/2023 Negative     Occult Blood, UA 12/15/2023 Negative     RBC, UA 12/15/2023 1-2     WBC, UA 12/15/2023 4-10 (A)     Epithelial Cells 12/15/2023 Occasional     Bacteria, UA 12/15/2023 None Seen     Vit D, 25-Hydroxy 12/15/2023 46.8     Phosphorus 12/15/2023 3.0     Magnesium 12/15/2023 2.0     WBC 12/15/2023 6.30     RBC 12/15/2023 4.53     Hemoglobin 12/15/2023 13.7     Hematocrit 12/15/2023 42.9     MCV 12/15/2023 95     MCH 12/15/2023 30.2     MCHC 12/15/2023 31.9     RDW 12/15/2023 14.1     Platelets 12/15/2023 174     MPV 12/15/2023 12.1     Hemoglobin A1C 12/15/2023 6.9 (H)     EAG 12/15/2023 151     Sodium 12/15/2023 138     Potassium 12/15/2023 4.3     Chloride 12/15/2023 101     CO2 12/15/2023 30     ANION GAP 12/15/2023 7     BUN 12/15/2023 20     Creatinine 12/15/2023 1.38 (H)     Glucose, Fasting 12/15/2023 116 (H)     Calcium 12/15/2023 9.5     AST 12/15/2023 24     ALT 12/15/2023 22     Alkaline Phosphatase 12/15/2023 60     Total Protein 12/15/2023 7.7     Albumin 12/15/2023 4.3     Total Bilirubin 12/15/2023 0.63     eGFR 12/15/2023 54     TSH 3RD GENERATON 12/15/2023 2.619     Cholesterol 12/15/2023 141     Triglycerides 12/15/2023 68     HDL, Direct 12/15/2023 42     LDL Calculated 12/15/2023 85          Radiology Results:   No results found.  Answers submitted by the patient for this visit:  Abdominal Pain Questionnaire (Submitted on 3/28/2024)  Chief Complaint: Abdominal pain  Chronicity: new  Progression since onset: resolved  anorexia: No  arthralgias: No  belching: No  constipation: Yes  diarrhea: No  dysuria: No  fever: No  flatus: Yes  frequency: Yes  headaches: No  hematochezia: No  hematuria: No  melena: Yes  myalgias: No  nausea: No  weight loss: No  vomiting: No  Aggravated by:  nothing  Relieved by: nothing  Diagnostic workup: GI consult, lower endoscopy

## 2024-03-28 NOTE — PATIENT INSTRUCTIONS
-Start Metamucil 1 heaping tablespoon daily in a full glass of water. This is a fiber supplement  -Drink plenty of water throughout the day  -Get regular exercise  -Before you start the new medication Amitiza 8 mcg twice daily with food, do a bowel prep with 238 g of MiraLAX mixed in 64 ounces of Gatorade to clean out your bowels.  You can start the Amitiza the following day  -If Amitiza is too expensive please let us know and we can try a different agent.  -Colonoscopy is due in     High Fiber Diet   AMBULATORY CARE:   A high-fiber diet  includes foods that have a high amount of fiber. Fiber is the part of fruits, vegetables, and grains that is not broken down by your body. Fiber keeps your bowel movements regular. Fiber can also help lower your cholesterol level, control blood sugar in people with diabetes, and relieve constipation. Fiber can also help you control your weight because it helps you feel full faster. Most adults should eat 25 to 35 grams of fiber each day. Talk to your dietitian or healthcare provider about the amount of fiber you need.  Good sources of fiber:       Foods with at least 4 grams of fiber per serving:      ? to ½ cup of high-fiber cereal (check the nutrition label on the box)    ½ cup of blackberries or raspberries    4 dried prunes    1 cooked artichoke    ½ cup of cooked legumes, such as lentils, or red, kidney, and baker beans    Foods with 1 to 3 grams of fiber per servin slice of whole-wheat, pumpernickel, or rye bread    ½ cup of cooked brown rice    4 whole-wheat crackers    1 cup of oatmeal    ½ cup of cereal with 1 to 3 grams of fiber per serving (check the nutrition label on the box)    1 small piece of fruit, such as an apple, banana, pear, kiwi, or orange    3 dates    ½ cup of canned apricots, fruit cocktail, peaches, or pears    ½ cup of raw or cooked vegetables, such as carrots, cauliflower, cabbage, spinach, squash, or corn  Ways that you can increase fiber  in your diet:   Choose brown or wild rice instead of white rice.     Use whole wheat flour in recipes instead of white or all-purpose flour.     Add beans and peas to casseroles or soups.     Choose fresh fruit and vegetables with peels or skins on instead of juices.    Other diet guidelines to follow:   Add fiber to your diet slowly.  You may have abdominal discomfort, bloating, and gas if you add fiber to your diet too quickly.     Drink plenty of liquids as you add fiber to your diet.  You may have nausea or develop constipation if you do not drink enough water. Ask how much liquid to drink each day and which liquids are best for you.    © Copyright [employer] 2023 Information is for End User's use only and may not be sold, redistributed or otherwise used for commercial purposes.  The above information is an  only. It is not intended as medical advice for individual conditions or treatments. Talk to your doctor, nurse or pharmacist before following any medical regimen to see if it is safe and effective for you.

## 2024-04-18 ENCOUNTER — TELEPHONE (OUTPATIENT)
Age: 64
End: 2024-04-18

## 2024-04-18 NOTE — TELEPHONE ENCOUNTER
Karissa from [employer] asking if fax was received for medication. Made aware I do not see a scan in chart. Karissa will send fax again and asks for it to be completed and faxed back to number listed on form. Thank you.

## 2024-04-18 NOTE — TELEPHONE ENCOUNTER
Form is regarding Jard[facility] wondering if patient is still taking. Per not in epic from 9/2023 too expensive pt could not afford. Once I receive fax I will have  fill out.     Form was sent to incorrect fax I asked that they fax to [place] office.

## 2024-05-20 ENCOUNTER — LAB (OUTPATIENT)
Dept: LAB | Facility: CLINIC | Age: 64
End: 2024-05-20
Payer: COMMERCIAL

## 2024-05-20 ENCOUNTER — TELEPHONE (OUTPATIENT)
Dept: NEPHROLOGY | Facility: CLINIC | Age: 64
End: 2024-05-20

## 2024-05-20 DIAGNOSIS — E11.22 CONTROLLED TYPE 2 DIABETES MELLITUS WITH STAGE 3 CHRONIC KIDNEY DISEASE, WITHOUT LONG-TERM CURRENT USE OF INSULIN (HCC): ICD-10-CM

## 2024-05-20 DIAGNOSIS — N13.8 BPH WITH OBSTRUCTION/LOWER URINARY TRACT SYMPTOMS: ICD-10-CM

## 2024-05-20 DIAGNOSIS — N18.30 BENIGN HYPERTENSION WITH CHRONIC KIDNEY DISEASE, STAGE III (HCC): ICD-10-CM

## 2024-05-20 DIAGNOSIS — N18.30 CONTROLLED TYPE 2 DIABETES MELLITUS WITH STAGE 3 CHRONIC KIDNEY DISEASE, WITHOUT LONG-TERM CURRENT USE OF INSULIN (HCC): ICD-10-CM

## 2024-05-20 DIAGNOSIS — I12.9 BENIGN HYPERTENSION WITH CHRONIC KIDNEY DISEASE, STAGE III (HCC): ICD-10-CM

## 2024-05-20 DIAGNOSIS — N40.1 BPH WITH OBSTRUCTION/LOWER URINARY TRACT SYMPTOMS: ICD-10-CM

## 2024-05-20 DIAGNOSIS — N18.31 STAGE 3A CHRONIC KIDNEY DISEASE (HCC): ICD-10-CM

## 2024-05-20 DIAGNOSIS — R35.0 INCREASED FREQUENCY OF URINATION: ICD-10-CM

## 2024-05-20 DIAGNOSIS — E55.9 VITAMIN D DEFICIENCY: ICD-10-CM

## 2024-05-20 LAB
25(OH)D3 SERPL-MCNC: 43.7 NG/ML (ref 30–100)
ANION GAP SERPL CALCULATED.3IONS-SCNC: 9 MMOL/L (ref 4–13)
BACTERIA UR QL AUTO: NORMAL /HPF
BILIRUB UR QL STRIP: NEGATIVE
BUN SERPL-MCNC: 19 MG/DL (ref 5–25)
CALCIUM SERPL-MCNC: 9.1 MG/DL (ref 8.4–10.2)
CHLORIDE SERPL-SCNC: 104 MMOL/L (ref 96–108)
CLARITY UR: CLEAR
CO2 SERPL-SCNC: 27 MMOL/L (ref 21–32)
COLOR UR: ABNORMAL
CREAT SERPL-MCNC: 1.36 MG/DL (ref 0.6–1.3)
CREAT UR-MCNC: 173.7 MG/DL
CREAT UR-MCNC: 173.7 MG/DL
ERYTHROCYTE [DISTWIDTH] IN BLOOD BY AUTOMATED COUNT: 14.4 % (ref 11.6–15.1)
EST. AVERAGE GLUCOSE BLD GHB EST-MCNC: 169 MG/DL
GFR SERPL CREATININE-BSD FRML MDRD: 55 ML/MIN/1.73SQ M
GLUCOSE P FAST SERPL-MCNC: 154 MG/DL (ref 65–99)
GLUCOSE UR STRIP-MCNC: NEGATIVE MG/DL
HBA1C MFR BLD: 7.5 %
HCT VFR BLD AUTO: 39.9 % (ref 36.5–49.3)
HGB BLD-MCNC: 12.6 G/DL (ref 12–17)
HGB UR QL STRIP.AUTO: NEGATIVE
KETONES UR STRIP-MCNC: NEGATIVE MG/DL
LEUKOCYTE ESTERASE UR QL STRIP: NEGATIVE
MAGNESIUM SERPL-MCNC: 1.9 MG/DL (ref 1.9–2.7)
MCH RBC QN AUTO: 29.5 PG (ref 26.8–34.3)
MCHC RBC AUTO-ENTMCNC: 31.6 G/DL (ref 31.4–37.4)
MCV RBC AUTO: 93 FL (ref 82–98)
MICROALBUMIN UR-MCNC: 35.6 MG/L
MICROALBUMIN/CREAT 24H UR: 20 MG/G CREATININE (ref 0–30)
NITRITE UR QL STRIP: POSITIVE
NON-SQ EPI CELLS URNS QL MICRO: NORMAL /HPF
PH UR STRIP.AUTO: 5.5 [PH]
PHOSPHATE SERPL-MCNC: 3 MG/DL (ref 2.3–4.1)
PLATELET # BLD AUTO: 147 THOUSANDS/UL (ref 149–390)
PMV BLD AUTO: 12.4 FL (ref 8.9–12.7)
POTASSIUM SERPL-SCNC: 4.1 MMOL/L (ref 3.5–5.3)
PROT UR STRIP-MCNC: ABNORMAL MG/DL
PROT UR-MCNC: 17 MG/DL
PROT/CREAT UR: 0.1 MG/G{CREAT} (ref 0–0.1)
PTH-INTACT SERPL-MCNC: 56.8 PG/ML (ref 12–88)
RBC # BLD AUTO: 4.27 MILLION/UL (ref 3.88–5.62)
RBC #/AREA URNS AUTO: NORMAL /HPF
SODIUM SERPL-SCNC: 140 MMOL/L (ref 135–147)
SP GR UR STRIP.AUTO: 1.02 (ref 1–1.03)
UROBILINOGEN UR STRIP-ACNC: <2 MG/DL
WBC # BLD AUTO: 6.74 THOUSAND/UL (ref 4.31–10.16)
WBC #/AREA URNS AUTO: NORMAL /HPF

## 2024-05-20 PROCEDURE — 83735 ASSAY OF MAGNESIUM: CPT

## 2024-05-20 PROCEDURE — 82043 UR ALBUMIN QUANTITATIVE: CPT

## 2024-05-20 PROCEDURE — 82570 ASSAY OF URINE CREATININE: CPT

## 2024-05-20 PROCEDURE — 83036 HEMOGLOBIN GLYCOSYLATED A1C: CPT

## 2024-05-20 PROCEDURE — 82306 VITAMIN D 25 HYDROXY: CPT

## 2024-05-20 PROCEDURE — 84156 ASSAY OF PROTEIN URINE: CPT

## 2024-05-20 PROCEDURE — 36415 COLL VENOUS BLD VENIPUNCTURE: CPT

## 2024-05-20 PROCEDURE — 83970 ASSAY OF PARATHORMONE: CPT

## 2024-05-20 PROCEDURE — 84100 ASSAY OF PHOSPHORUS: CPT

## 2024-05-20 PROCEDURE — 80048 BASIC METABOLIC PNL TOTAL CA: CPT

## 2024-05-20 PROCEDURE — 85027 COMPLETE CBC AUTOMATED: CPT

## 2024-05-20 PROCEDURE — 81001 URINALYSIS AUTO W/SCOPE: CPT

## 2024-05-20 NOTE — TELEPHONE ENCOUNTER
I porfirio for Ever, renal function was stable including potassium. I asked patient to call office to schedule follow up appointment .

## 2024-05-20 NOTE — TELEPHONE ENCOUNTER
----- Message from Dameon Santos MD sent at 5/20/2024  1:08 PM EDT -----  Please inform patient that renal function is stable at creatinine 1.36 mg/dL, potassium at normal range.  Please arrange for follow-up with me on an AP.

## 2024-05-20 NOTE — RESULT ENCOUNTER NOTE
Please inform patient that renal function is stable at creatinine 1.36 mg/dL, potassium at normal range.  Please arrange for follow-up with me on an AP.

## 2024-05-22 ENCOUNTER — OFFICE VISIT (OUTPATIENT)
Dept: FAMILY MEDICINE CLINIC | Facility: CLINIC | Age: 64
End: 2024-05-22
Payer: COMMERCIAL

## 2024-05-22 VITALS
BODY MASS INDEX: 43.09 KG/M2 | HEIGHT: 70 IN | OXYGEN SATURATION: 97 % | DIASTOLIC BLOOD PRESSURE: 78 MMHG | WEIGHT: 301 LBS | SYSTOLIC BLOOD PRESSURE: 124 MMHG | RESPIRATION RATE: 16 BRPM | HEART RATE: 66 BPM

## 2024-05-22 DIAGNOSIS — E11.21 TYPE 2 DIABETES MELLITUS WITH DIABETIC NEPHROPATHY, WITHOUT LONG-TERM CURRENT USE OF INSULIN (HCC): ICD-10-CM

## 2024-05-22 DIAGNOSIS — N18.30 BENIGN HYPERTENSION WITH CHRONIC KIDNEY DISEASE, STAGE III (HCC): Primary | ICD-10-CM

## 2024-05-22 DIAGNOSIS — N39.0 URINARY TRACT INFECTION WITHOUT HEMATURIA, SITE UNSPECIFIED: ICD-10-CM

## 2024-05-22 DIAGNOSIS — I12.9 BENIGN HYPERTENSION WITH CHRONIC KIDNEY DISEASE, STAGE III (HCC): Primary | ICD-10-CM

## 2024-05-22 DIAGNOSIS — G47.33 OSA (OBSTRUCTIVE SLEEP APNEA): ICD-10-CM

## 2024-05-22 DIAGNOSIS — R82.90 ABNORMAL URINE FINDINGS: ICD-10-CM

## 2024-05-22 PROBLEM — N18.31 STAGE 3A CHRONIC KIDNEY DISEASE (HCC): Status: ACTIVE | Noted: 2018-09-06

## 2024-05-22 LAB
LEFT EYE DIABETIC RETINOPATHY: ABNORMAL
LEFT EYE IMAGE QUALITY: ABNORMAL
LEFT EYE MACULAR EDEMA: ABNORMAL
LEFT EYE OTHER RETINOPATHY: ABNORMAL
RIGHT EYE DIABETIC RETINOPATHY: ABNORMAL
RIGHT EYE IMAGE QUALITY: ABNORMAL
RIGHT EYE MACULAR EDEMA: ABNORMAL
RIGHT EYE OTHER RETINOPATHY: ABNORMAL
SEVERITY (EYE EXAM): ABNORMAL
SL AMB  POCT GLUCOSE, UA: NORMAL
SL AMB LEUKOCYTE ESTERASE,UA: 500
SL AMB POCT BILIRUBIN,UA: NORMAL
SL AMB POCT BLOOD,UA: NORMAL
SL AMB POCT CLARITY,UA: CLEAR
SL AMB POCT COLOR,UA: YELLOW
SL AMB POCT KETONES,UA: NORMAL
SL AMB POCT NITRITE,UA: NORMAL
SL AMB POCT PH,UA: 6
SL AMB POCT SPECIFIC GRAVITY,UA: 1.01
SL AMB POCT URINE PROTEIN: NORMAL
SL AMB POCT UROBILINOGEN: NORMAL

## 2024-05-22 PROCEDURE — 99214 OFFICE O/P EST MOD 30 MIN: CPT | Performed by: FAMILY MEDICINE

## 2024-05-22 PROCEDURE — 81003 URINALYSIS AUTO W/O SCOPE: CPT | Performed by: FAMILY MEDICINE

## 2024-05-22 RX ORDER — CIPROFLOXACIN 500 MG/1
500 TABLET, FILM COATED ORAL EVERY 12 HOURS SCHEDULED
Qty: 14 TABLET | Refills: 0 | Status: SHIPPED | OUTPATIENT
Start: 2024-05-22 | End: 2024-05-29

## 2024-05-22 NOTE — ASSESSMENT & PLAN NOTE
Repeat urine today shows 500 leukocyte and nitrite positive, urine will be sent for culture and start him on antibiotics Cipro for 7 days and advised to not take glipizide till he finished the Cipro

## 2024-05-22 NOTE — PROGRESS NOTES
Ambulatory Visit  Name: Ever Price      : 1960      MRN: [id number]  Encounter Provider: Chhaya Walker MD  Encounter Date: 2024   Encounter department: [facility]    Assessment & Plan   1. Benign hypertension with chronic kidney disease, stage III ([facility])  Assessment & Plan:  Lab Results   Component Value Date    EGFR 55 2024    EGFR 54 12/15/2023    EGFR 52 2023    CREATININE 1.36 (H) 2024    CREATININE 1.38 (H) 12/15/2023    CREATININE 1.42 (H) 2023   Creatinine remained in stable condition, advised to take the losartan which is also kidney protective, he is not taking this medication  Orders:  -     Ambulatory Referral to Cardiology; Future  2. STACY (obstructive sleep apnea)  3. BMI 40.0-44.9, adult ([facility])  -     semaglutide (Rybelsus) 3 MG tablet; Take 1 tablet (3 mg total) by mouth daily before breakfast  -     Ambulatory Referral to Cardiology; Future  4. Type 2 diabetes mellitus with diabetic nephropathy, without long-term current use of insulin ([facility])  Assessment & Plan:  Follows nephrologist, his A1c is getting worse, he tries to eat more healthy but no change in weight, I will add Rybelsus advised to restart the metformin and glipizide which she is not taking for months recheck labs in 3-month and follow-up,  Lab Results   Component Value Date    HGBA1C 7.5 (H) 2024     Orders:  -     IRIS Diabetic eye exam  -     semaglutide (Rybelsus) 3 MG tablet; Take 1 tablet (3 mg total) by mouth daily before breakfast  -     Ambulatory Referral to Cardiology; Future  -     Comprehensive metabolic panel; Future; Expected date: 2024  -     Hemoglobin A1C; Future; Expected date: 2024  -     CBC and Platelet; Future; Expected date: 2024  -     Lipid Panel with Direct LDL reflex; Future; Expected date: 2024  5. Abnormal urine findings  Assessment & Plan:  Urine test shows nitrate positive, he is asymptomatic, urine is rechecked in the  office  Orders:  -     POCT urine dip  6. Urinary tract infection without hematuria, site unspecified  Assessment & Plan:  Repeat urine today shows 500 leukocyte and nitrite positive, urine will be sent for culture and start him on antibiotics Cipro for 7 days and advised to not take glipizide till he finished the Cipro  Orders:  -     ciprofloxacin (CIPRO) 500 mg tablet; Take 1 tablet (500 mg total) by mouth every 12 (twelve) hours for 7 days         History of Present Illness     Follow-up on labs, he is diabetic, he stated he has not been taking metformin and glipizide he is trying to eat healthier more salads his constipation resolved with the solid, he did not take the medication given by GI ,amitiza.  He also not taking losartan he says he only takes amlodipine, he is unable to lose any weight, he has chronic kidney disease and follow-up with the nephrologist  Sleep apnea and sleeping fine with the CPAP machine      Review of Systems   Constitutional:  Negative for activity change, appetite change, chills, fatigue, fever and unexpected weight change.   HENT:  Negative for congestion, ear discharge, ear pain, nosebleeds, postnasal drip, rhinorrhea, sinus pressure, sneezing, sore throat, trouble swallowing and voice change.    Eyes:  Negative for photophobia, pain, discharge, redness and itching.   Respiratory:  Negative for cough, chest tightness, shortness of breath and wheezing.    Cardiovascular:  Negative for chest pain, palpitations and leg swelling.   Gastrointestinal:  Negative for abdominal pain, constipation, diarrhea, nausea and vomiting.   Endocrine: Negative for polyuria.   Genitourinary:  Negative for dysuria, frequency and urgency.   Musculoskeletal:  Negative for arthralgias, back pain, myalgias and neck pain.   Skin:  Negative for color change, pallor and rash.   Allergic/Immunologic: Negative for environmental allergies and food allergies.   Neurological:  Negative for dizziness, weakness,  light-headedness and headaches.   Hematological:  Negative for adenopathy. Does not bruise/bleed easily.   Psychiatric/Behavioral:  Negative for behavioral problems and sleep disturbance. The patient is not nervous/anxious.      Past Medical History:   Diagnosis Date   • Anxiety    • BPH (benign prostatic hyperplasia)    • Colon polyp    • CPAP (continuous positive airway pressure) dependence    • Diabetes mellitus (HCC)    • Difficulty breathing 2011   • Hypertension    • Impaired fasting blood sugar     Last Assessed: 2013   • Obesity    • Sleep apnea    • Type 2 diabetes mellitus with diabetic nephropathy, without long-term current use of insulin (HCC) 2024     Past Surgical History:   Procedure Laterality Date   • COLONOSCOPY      Fiberoptic; Last Assessed: 10/24/2012     Family History   Problem Relation Age of Onset   • Heart murmur Mother    • Cerebral palsy Mother    • Heart disease Mother    • Sudden death Mother         Early   • Cancer Mother    • Hypertension Mother            • Breast cancer Sister 50   • Pancreatic cancer Maternal Grandmother 60   • Emphysema Maternal Grandfather    • Hypertension Father    • Prostate cancer Father    • Cancer Father    • Colon cancer Neg Hx    • Anuerysm Neg Hx    • Arrhythmia Neg Hx    • Heart failure Neg Hx    • Clotting disorder Neg Hx      Social History     Tobacco Use   • Smoking status: Former     Current packs/day: 0.00     Average packs/day: 0.3 packs/day for 36.3 years (9.1 ttl pk-yrs)     Types: Cigarettes     Start date: 3/1/1981     Quit date: 2017     Years since quittin.9   • Smokeless tobacco: Never   Vaping Use   • Vaping status: Never Used   Substance and Sexual Activity   • Alcohol use: Yes     Alcohol/week: 12.0 standard drinks of alcohol     Types: 6 Cans of beer, 6 Standard drinks or equivalent per week     Comment: Social   • Drug use: No   • Sexual activity: Yes     Partners: Female     Birth control/protection:  "Condom Male     Comment: no new partners in the past 3months      Current Outpatient Medications on File Prior to Visit   Medication Sig   • amLODIPine (NORVASC) 10 mg tablet Take 1 tablet (10 mg total) by mouth daily   • azelastine (ASTELIN) 0.1 % nasal spray 1 spray into each nostril 2 (two) times a day Use in each nostril as directed   • Cholecalciferol (Vitamin D3) 50 MCG (2000 UT) TABS Take 1 tablet (2,000 Units total) by mouth daily   • glipiZIDE (GLUCOTROL) 5 mg tablet Take 1 tablet (5 mg total) by mouth daily with breakfast   • losartan (COZAAR) 25 mg tablet Take 1 tablet (25 mg total) by mouth daily   • lubiprostone (AMITIZA) 8 mcg capsule Take 1 capsule (8 mcg total) by mouth 2 (two) times a day with meals   • metFORMIN (GLUCOPHAGE) 500 mg tablet Take 1 tablet (500 mg total) by mouth 2 (two) times a day with meals   • sildenafil (VIAGRA) 100 mg tablet TAKE 1 TABLET BY MOUTH ONCE DAILY AS NEEDED FOR ERECTILE DYSFUNCTION   • [DISCONTINUED] clotrimazole (LOTRIMIN) 1 % cream Apply topically 2 (two) times a day (Patient not taking: Reported on 10/19/2023)     Allergies   Allergen Reactions   • Nuts - Food Allergy Anaphylaxis     Immunization History   Administered Date(s) Administered   • COVID-19 PFIZER VACCINE 0.3 ML IM 04/20/2021, 05/11/2021, 12/04/2021   • Td (adult), adsorbed 12/17/2010, 02/24/2014   • Tdap 02/24/2014     Objective     /78   Pulse 66   Resp 16   Ht 5' 10\" (1.778 m)   Wt (!) 137 kg (301 lb)   SpO2 97%   BMI 43.19 kg/m²     Physical Exam  Vitals and nursing note reviewed.   Constitutional:       General: He is not in acute distress.     Appearance: He is obese. He is not ill-appearing.   HENT:      Head: Normocephalic and atraumatic.      Right Ear: Tympanic membrane and ear canal normal.      Left Ear: Tympanic membrane and ear canal normal.      Nose: Nose normal. No rhinorrhea.      Mouth/Throat:      Mouth: Mucous membranes are moist.      Pharynx: Oropharynx is clear. No " oropharyngeal exudate or posterior oropharyngeal erythema.   Eyes:      Extraocular Movements: Extraocular movements intact.      Conjunctiva/sclera: Conjunctivae normal.      Pupils: Pupils are equal, round, and reactive to light.   Neck:      Vascular: No carotid bruit.   Cardiovascular:      Rate and Rhythm: Normal rate and regular rhythm.      Heart sounds: No murmur heard.  Pulmonary:      Effort: Pulmonary effort is normal.      Breath sounds: Normal breath sounds. No wheezing or rales.   Abdominal:      General: Abdomen is flat. Bowel sounds are normal. There is no distension.      Palpations: Abdomen is soft. There is no mass.      Tenderness: There is no abdominal tenderness. There is no guarding.   Musculoskeletal:         General: No swelling, tenderness, deformity or signs of injury. Normal range of motion.      Cervical back: Normal range of motion and neck supple.   Skin:     Coloration: Skin is not jaundiced or pale.      Findings: No rash.   Neurological:      General: No focal deficit present.      Mental Status: He is alert and oriented to person, place, and time.      Motor: No weakness.   Psychiatric:         Mood and Affect: Mood normal.         Behavior: Behavior normal.         Thought Content: Thought content normal.         Judgment: Judgment normal.       Administrative Statements

## 2024-05-22 NOTE — ASSESSMENT & PLAN NOTE
Follows nephrologist, his A1c is getting worse, he tries to eat more healthy but no change in weight, I will add Rybelsus advised to restart the metformin and glipizide which she is not taking for months recheck labs in 3-month and follow-up,  Lab Results   Component Value Date    HGBA1C 7.5 (H) 05/20/2024

## 2024-05-22 NOTE — ASSESSMENT & PLAN NOTE
Lab Results   Component Value Date    EGFR 55 05/20/2024    EGFR 54 12/15/2023    EGFR 52 05/24/2023    CREATININE 1.36 (H) 05/20/2024    CREATININE 1.38 (H) 12/15/2023    CREATININE 1.42 (H) 05/24/2023   Creatinine remained in stable condition, advised to take the losartan which is also kidney protective, he is not taking this medication

## 2024-05-29 NOTE — PATIENT INSTRUCTIONS
Drink Plently of fluids   High fiber diet
Is This A New Presentation, Or A Follow-Up?: Nail Discoloration

## 2024-06-21 PROBLEM — N39.0 URINARY TRACT INFECTION WITHOUT HEMATURIA: Status: RESOLVED | Noted: 2024-05-22 | Resolved: 2024-06-21

## 2024-07-03 ENCOUNTER — CONSULT (OUTPATIENT)
Dept: CARDIOLOGY CLINIC | Facility: CLINIC | Age: 64
End: 2024-07-03
Payer: COMMERCIAL

## 2024-07-03 VITALS
WEIGHT: 305.5 LBS | DIASTOLIC BLOOD PRESSURE: 90 MMHG | HEIGHT: 70 IN | HEART RATE: 62 BPM | SYSTOLIC BLOOD PRESSURE: 172 MMHG | OXYGEN SATURATION: 96 % | BODY MASS INDEX: 43.74 KG/M2

## 2024-07-03 DIAGNOSIS — E11.21 TYPE 2 DIABETES MELLITUS WITH DIABETIC NEPHROPATHY, WITHOUT LONG-TERM CURRENT USE OF INSULIN (HCC): ICD-10-CM

## 2024-07-03 DIAGNOSIS — N18.30 BENIGN HYPERTENSION WITH CHRONIC KIDNEY DISEASE, STAGE III (HCC): ICD-10-CM

## 2024-07-03 DIAGNOSIS — I49.3 PVC (PREMATURE VENTRICULAR CONTRACTION): ICD-10-CM

## 2024-07-03 DIAGNOSIS — I45.10 RBBB: Primary | ICD-10-CM

## 2024-07-03 DIAGNOSIS — I12.9 BENIGN HYPERTENSION WITH CHRONIC KIDNEY DISEASE, STAGE III (HCC): ICD-10-CM

## 2024-07-03 DIAGNOSIS — I10 ESSENTIAL HYPERTENSION: ICD-10-CM

## 2024-07-03 PROCEDURE — 99244 OFF/OP CNSLTJ NEW/EST MOD 40: CPT | Performed by: INTERNAL MEDICINE

## 2024-07-03 NOTE — PROGRESS NOTES
Consultation - Cardiology   Ever Price 64 y.o. male MRN: [id number]    Encounter: [id number]    Assessment & Plan     Assessment:     RBBB  Hypertension    Plan:    RBBB: New compared to prior EKGs available. Check echocardiogram. He denies any significant change in dyspnea or chest pain.     Hypertension: BP is running high in other office visits. He is reluctant to do a med change today. Recommend to check BP at home and to log his readings and bring to his next PCP or nephrology appointment for further evaluation.     Hx of PVCs: currently not an issue and asymptomatic. No PVCs on EKG today.       History of Present Illness   Physician Requesting Consult: No att. providers found  Reason for Consult / Principal Problem: Hypertension  HPI: Ever Price is a 64 y.o. year old male who presents with a history of hypertension, Type 2 DM and CKD.  He has no known prior history of CAD or CHF. He has no significant exertional dyspnea, chest pain or LE edema. He has a BP cuff at home but is unsure of his numbers. After reviewing other office visits his BP tends to be elevated.     Review of Systems   Constitutional: Negative.   HENT: Negative.     Eyes: Negative.    Cardiovascular: Negative.    Respiratory: Negative.     Endocrine: Negative.    Hematologic/Lymphatic: Negative.    Skin: Negative.    Musculoskeletal: Negative.    Gastrointestinal: Negative.    Genitourinary: Negative.    Neurological: Negative.    Psychiatric/Behavioral: Negative.     Allergic/Immunologic: Negative.        Historical Information   Past Medical History:   Diagnosis Date    Anxiety     BPH (benign prostatic hyperplasia)     Colon polyp     CPAP (continuous positive airway pressure) dependence     Diabetes mellitus (HCC)     Difficulty breathing 01/05/2011    Hypertension     Impaired fasting blood sugar     Last Assessed: 1/2/2013    Obesity     Sleep apnea     Type 2 diabetes mellitus with diabetic nephropathy, without long-term  current use of insulin (HCC) 2024     Past Surgical History:   Procedure Laterality Date    COLONOSCOPY      Fiberoptic; Last Assessed: 10/24/2012       Social History:  Social History     Substance and Sexual Activity   Alcohol Use Yes    Alcohol/week: 12.0 standard drinks of alcohol    Types: 6 Cans of beer, 6 Standard drinks or equivalent per week    Comment: Social - less than 12 for the most part     Social History     Substance and Sexual Activity   Drug Use No     Social History     Tobacco Use   Smoking Status Former    Current packs/day: 0.00    Average packs/day: 0.3 packs/day for 36.3 years (9.1 ttl pk-yrs)    Types: Cigarettes    Start date: 3/1/1981    Quit date: 2017    Years since quittin.0   Smokeless Tobacco Never       Family History:   Family History   Problem Relation Age of Onset    Heart murmur Mother     Cerebral palsy Mother     Heart disease Mother     Sudden death Mother         Early    Cancer Mother     Hypertension Mother             Breast cancer Sister 50    Pancreatic cancer Maternal Grandmother 60    Emphysema Maternal Grandfather     Hypertension Father     Prostate cancer Father     Cancer Father     Colon cancer Neg Hx     Anuerysm Neg Hx     Arrhythmia Neg Hx     Heart failure Neg Hx     Clotting disorder Neg Hx        Meds/Allergies   Allergies   Allergen Reactions    Nuts - Food Allergy Anaphylaxis       Current Outpatient Medications:     amLODIPine (NORVASC) 10 mg tablet, Take 1 tablet (10 mg total) by mouth daily, Disp: 90 tablet, Rfl: 3    azelastine (ASTELIN) 0.1 % nasal spray, 1 spray into each nostril 2 (two) times a day Use in each nostril as directed, Disp: 30 mL, Rfl: 0    Cholecalciferol (Vitamin D3) 50 MCG (2000 UT) TABS, Take 1 tablet (2,000 Units total) by mouth daily, Disp: 90 tablet, Rfl: 1    glipiZIDE (GLUCOTROL) 5 mg tablet, Take 1 tablet (5 mg total) by mouth daily with breakfast, Disp: 90 tablet, Rfl: 3    losartan (COZAAR) 25 mg  tablet, Take 1 tablet (25 mg total) by mouth daily, Disp: 90 tablet, Rfl: 3    sildenafil (VIAGRA) 100 mg tablet, TAKE 1 TABLET BY MOUTH ONCE DAILY AS NEEDED FOR ERECTILE DYSFUNCTION, Disp: 30 tablet, Rfl: 0    lubiprostone (AMITIZA) 8 mcg capsule, Take 1 capsule (8 mcg total) by mouth 2 (two) times a day with meals, Disp: 30 capsule, Rfl: 1    metFORMIN (GLUCOPHAGE) 500 mg tablet, Take 1 tablet (500 mg total) by mouth 2 (two) times a day with meals (Patient not taking: Reported on 7/3/2024), Disp: 180 tablet, Rfl: 0    semaglutide (Rybelsus) 3 MG tablet, Take 1 tablet (3 mg total) by mouth daily before breakfast, Disp: 30 tablet, Rfl: 0    Vitals:   Pulse: 62  Blood Pressue: (!) 172/90  Weight: (!) 139 kg (305 lb 8 oz)      Physical Exam  Constitutional:       General: He is not in acute distress.     Appearance: He is well-developed. He is not diaphoretic.   HENT:      Head: Normocephalic.   Eyes:      General: No scleral icterus.        Right eye: No discharge.      Conjunctiva/sclera: Conjunctivae normal.   Neck:      Vascular: No JVD.   Cardiovascular:      Rate and Rhythm: Normal rate and regular rhythm.      Heart sounds: No murmur heard.     No friction rub. No gallop.   Pulmonary:      Effort: Pulmonary effort is normal. No respiratory distress.      Breath sounds: Normal breath sounds. No wheezing or rales.   Abdominal:      General: Bowel sounds are normal. There is no distension.      Palpations: Abdomen is soft.      Tenderness: There is no abdominal tenderness. There is no rebound.   Musculoskeletal:         General: No tenderness, deformity or edema.      Cervical back: Normal range of motion.   Skin:     General: Skin is warm and dry.   Neurological:      Mental Status: He is alert and oriented to person, place, and time.   Psychiatric:         Mood and Affect: Mood and affect normal.         [unfilled]    Invasive Devices       None                   Lab Results   Component Value Date      "05/20/2024    CO2 27 05/20/2024    BUN 19 05/20/2024    CREATININE 1.36 (H) 05/20/2024    EGFR 55 05/20/2024    CALCIUM 9.1 05/20/2024    AST 24 12/15/2023    ALT 22 12/15/2023    ALKPHOS 60 12/15/2023     Lab Results   Component Value Date    WBC 6.74 05/20/2024    HGB 12.6 05/20/2024     (L) 05/20/2024     No components found for: \"TROP\"    Imaging:     EKG: NSR RBBB Left Axis.     Counseling / Coordination of Care  Total floor / unit time spent today 45 minutes.  Greater than 50% of total time was spent with the patient and / or family counseling and / or coordination of care.  A description of the counseling / coordination of care.      "

## 2024-07-22 ENCOUNTER — HOSPITAL ENCOUNTER (OUTPATIENT)
Dept: NON INVASIVE DIAGNOSTICS | Facility: CLINIC | Age: 64
Discharge: HOME | End: 2024-07-22

## 2024-07-22 VITALS
BODY MASS INDEX: 43.67 KG/M2 | HEIGHT: 70 IN | WEIGHT: 305 LBS | SYSTOLIC BLOOD PRESSURE: 172 MMHG | HEART RATE: 62 BPM | DIASTOLIC BLOOD PRESSURE: 90 MMHG

## 2024-07-22 DIAGNOSIS — I45.10 RBBB: ICD-10-CM

## 2024-07-22 LAB
AORTIC ROOT: 3.8 CM
APICAL FOUR CHAMBER EJECTION FRACTION: 62 %
ASCENDING AORTA: 3.9 CM
BSA FOR ECHO PROCEDURE: 2.5 M2
E WAVE DECELERATION TIME: 209 MS
E/A RATIO: 1.05
FRACTIONAL SHORTENING: 41 (ref 28–44)
INTERVENTRICULAR SEPTUM IN DIASTOLE (PARASTERNAL SHORT AXIS VIEW): 1.3 CM
INTERVENTRICULAR SEPTUM: 1.3 CM (ref 0.6–1.1)
LAAS-AP2: 20.5 CM2
LAAS-AP4: 22.1 CM2
LEFT ATRIUM SIZE: 4.5 CM
LEFT ATRIUM VOLUME (MOD BIPLANE): 63 ML
LEFT ATRIUM VOLUME INDEX (MOD BIPLANE): 25.2 ML/M2
LEFT INTERNAL DIMENSION IN SYSTOLE: 3 CM (ref 2.1–4)
LEFT VENTRICULAR INTERNAL DIMENSION IN DIASTOLE: 5.1 CM (ref 3.5–6)
LEFT VENTRICULAR POSTERIOR WALL IN END DIASTOLE: 1.4 CM
LEFT VENTRICULAR STROKE VOLUME: 87 ML
LVSV (TEICH): 87 ML
MV E'TISSUE VEL-LAT: 12 CM/S
MV E'TISSUE VEL-SEP: 11 CM/S
MV PEAK A VEL: 0.87 M/S
MV PEAK E VEL: 91 CM/S
MV STENOSIS PRESSURE HALF TIME: 61 MS
MV VALVE AREA P 1/2 METHOD: 3.61
RA PRESSURE ESTIMATED: 10 MMHG
RIGHT ATRIUM AREA SYSTOLE A4C: 15.5 CM2
RIGHT VENTRICLE ID DIMENSION: 4.4 CM
RV PSP: 26 MMHG
SL CV LEFT ATRIUM LENGTH A2C: 6 CM
SL CV LV EF: 55
SL CV PED ECHO LEFT VENTRICLE DIASTOLIC VOLUME (MOD BIPLANE) 2D: 122 ML
SL CV PED ECHO LEFT VENTRICLE SYSTOLIC VOLUME (MOD BIPLANE) 2D: 35 ML
TR MAX PG: 16 MMHG
TR PEAK VELOCITY: 2 M/S
TRICUSPID ANNULAR PLANE SYSTOLIC EXCURSION: 1.9 CM
TRICUSPID VALVE PEAK REGURGITATION VELOCITY: 1.98 M/S

## 2024-07-22 PROCEDURE — 93306 TTE W/DOPPLER COMPLETE: CPT | Performed by: INTERNAL MEDICINE

## 2024-07-22 PROCEDURE — 93306 TTE W/DOPPLER COMPLETE: CPT

## 2024-10-23 DIAGNOSIS — Z00.6 ENCOUNTER FOR EXAMINATION FOR NORMAL COMPARISON OR CONTROL IN CLINICAL RESEARCH PROGRAM: ICD-10-CM

## 2025-01-27 ENCOUNTER — TELEPHONE (OUTPATIENT)
Dept: FAMILY MEDICINE CLINIC | Facility: CLINIC | Age: 65
End: 2025-01-27

## 2025-01-27 NOTE — TELEPHONE ENCOUNTER
I left a message regarding patient's fasting labs that are overdue and to schedule a follow up visit with PCP.